# Patient Record
Sex: MALE | Race: WHITE | NOT HISPANIC OR LATINO | Employment: OTHER | URBAN - METROPOLITAN AREA
[De-identification: names, ages, dates, MRNs, and addresses within clinical notes are randomized per-mention and may not be internally consistent; named-entity substitution may affect disease eponyms.]

---

## 2017-01-19 ENCOUNTER — TRANSFERRED RECORDS (OUTPATIENT)
Dept: HEALTH INFORMATION MANAGEMENT | Facility: CLINIC | Age: 67
End: 2017-01-19

## 2017-02-02 ENCOUNTER — OFFICE VISIT (OUTPATIENT)
Dept: CARDIOLOGY | Facility: CLINIC | Age: 67
End: 2017-02-02
Attending: INTERNAL MEDICINE
Payer: COMMERCIAL

## 2017-02-02 VITALS
HEIGHT: 70 IN | HEART RATE: 60 BPM | BODY MASS INDEX: 25.77 KG/M2 | DIASTOLIC BLOOD PRESSURE: 76 MMHG | SYSTOLIC BLOOD PRESSURE: 120 MMHG | WEIGHT: 180 LBS

## 2017-02-02 DIAGNOSIS — R55 VASOVAGAL SYNCOPE: ICD-10-CM

## 2017-02-02 DIAGNOSIS — I10 HYPERTENSION GOAL BP (BLOOD PRESSURE) < 140/90: ICD-10-CM

## 2017-02-02 DIAGNOSIS — Z13.6 CARDIOVASCULAR SCREENING; LDL GOAL LESS THAN 130: ICD-10-CM

## 2017-02-02 DIAGNOSIS — I67.9 CEREBROVASCULAR DISEASE: ICD-10-CM

## 2017-02-02 DIAGNOSIS — I25.810 CORONARY ARTERY DISEASE INVOLVING CORONARY BYPASS GRAFT OF NATIVE HEART WITHOUT ANGINA PECTORIS: ICD-10-CM

## 2017-02-02 DIAGNOSIS — R94.39 ABNORMAL CARDIOVASCULAR STRESS TEST: ICD-10-CM

## 2017-02-02 DIAGNOSIS — I43 CARDIOMYOPATHY IN DISEASES CLASSIFIED ELSEWHERE (H): ICD-10-CM

## 2017-02-02 LAB
ALT SERPL W P-5'-P-CCNC: 43 U/L (ref 0–70)
CHOLEST SERPL-MCNC: 127 MG/DL
HDLC SERPL-MCNC: 63 MG/DL
LDLC SERPL CALC-MCNC: 53 MG/DL
NONHDLC SERPL-MCNC: 64 MG/DL
TRIGL SERPL-MCNC: 54 MG/DL

## 2017-02-02 PROCEDURE — 36415 COLL VENOUS BLD VENIPUNCTURE: CPT | Performed by: INTERNAL MEDICINE

## 2017-02-02 PROCEDURE — 80061 LIPID PANEL: CPT | Performed by: INTERNAL MEDICINE

## 2017-02-02 PROCEDURE — 99214 OFFICE O/P EST MOD 30 MIN: CPT | Performed by: INTERNAL MEDICINE

## 2017-02-02 PROCEDURE — 84460 ALANINE AMINO (ALT) (SGPT): CPT | Performed by: INTERNAL MEDICINE

## 2017-02-02 RX ORDER — VIT C/E/ZN/COPPR/LUTEIN/ZEAXAN 60 MG-6 MG
1 CAPSULE ORAL DAILY
COMMUNITY
End: 2020-02-11

## 2017-02-02 NOTE — MR AVS SNAPSHOT
After Visit Summary   2/2/2017    Mynor Curry    MRN: [id number]           Patient Information     Date Of Birth          1950        Visit Information        Provider Department      2/2/2017 9:15 AM Alfonso Ng MD [facility]        Today's Diagnoses     Hypertension goal BP (blood pressure) < 140/90         Abnormal cardiovascular stress test         Coronary artery disease involving coronary bypass graft of native heart without angina pectoris         Cerebrovascular disease         Cardiomyopathy in diseases classified elsewhere (H)         Vasovagal syncope         CARDIOVASCULAR SCREENING; LDL GOAL LESS THAN 130            Follow-ups after your visit        Additional Services     Follow-Up with Cardiologist                 Future tests that were ordered for you today     Open Future Orders        Priority Expected Expires Ordered    Lipid Profile Routine 2/2/2018 3/9/2018 2/2/2017    ALT Routine 2/2/2018 3/9/2018 2/2/2017    Follow-Up with Cardiologist Routine 2/2/2018 6/17/2018 2/2/2017            Who to contact     If you have questions or need follow up information about today's clinic visit or your schedule please contact [facility] directly at [phone].  Normal or non-critical lab and imaging results will be communicated to you by MyChart, letter or phone within 4 business days after the clinic has received the results. If you do not hear from us within 7 days, please contact the clinic through [employer] or phone. If you have a critical or abnormal lab result, we will notify you by phone as soon as possible.  Submit refill requests through [employer] or call your pharmacy and they will forward the refill request to us. Please allow 3 business days for your refill to be completed.          Additional Information About Your Visit        [employer] Information     [employer] gives you secure  "access to your electronic health record. If you see a primary care provider, you can also send messages to your care team and make appointments. If you have questions, please call your primary care clinic.  If you do not have a primary care provider, please call [phone] and they will assist you.        Care EveryWhere ID     This is your Care EveryWhere ID. This could be used by other organizations to access your [facility] medical records  [id number]        Your Vitals Were     Pulse Height BMI (Body Mass Index)             60 1.778 m (5' 10\") 25.83 kg/m2          Blood Pressure from Last 3 Encounters:   02/02/17 120/76   12/27/16 129/81   10/17/16 153/86    Weight from Last 3 Encounters:   02/02/17 81.647 kg (180 lb)   10/17/16 79.833 kg (176 lb)   02/05/16 83.008 kg (183 lb)              We Performed the Following     Follow-Up with Cardiologist        Primary Care Provider Office Phone # Fax #    Doug Seo Jr., [phone]       [facility] [address] [facility] [address]        Thank you!     Thank you for choosing [facility] [place]  for your care. Our goal is always to provide you with excellent care. Hearing back from our patients is one way we can continue to improve our services. Please take a few minutes to complete the written survey that you may receive in the mail after your visit with us. Thank you!             Your Updated Medication List - Protect others around you: Learn how to safely use, store and throw away your medicines at www.disposemymeds.org.          This list is accurate as of: 2/2/17  9:35 AM.  Always use your most recent med list.                   Brand Name Dispense Instructions for use    ADVIL PO      Take 400 mg by mouth every 4 hours as needed       aspirin EC 81 MG EC tablet      Take 1 tablet (81 mg) by mouth daily       cyclobenzaprine 10 MG tablet    FLEXERIL    14 tablet    Take 1 tablet (10 mg) by " mouth nightly as needed for muscle spasms       * DAILY MULTIVITAMIN PO      Take by mouth daily       * multivitamin  with lutein Caps per capsule      Take 1 capsule by mouth daily       famotidine 20 MG tablet    PEPCID     Take 20 mg by mouth 2 times daily       LORazepam 1 MG tablet    ATIVAN    30 tablet    Take 1 tablet (1 mg) by mouth every 8 hours as needed for anxiety       losartan-hydrochlorothiazide 100-12.5 MG per tablet    HYZAAR    90 tablet    Take 1 tablet by mouth daily       rosuvastatin 20 MG tablet    CRESTOR    90 tablet    Take 1 tablet (20 mg) by mouth daily       sildenafil 100 MG cap/tab    VIAGRA    6 tablet    Take 0.5-1 tablets ( mg) by mouth daily as needed for erectile dysfunction Take 30 min to 4 hours before intercourse.  Never use with nitroglycerin, terazosin or doxazosin.       valACYclovir 500 MG tablet    VALTREX    30 tablet    One tablet by mouth twice daily for three days for each outbreak       vitamin D 2000 UNITS Caps      Take by mouth daily       * Notice:  This list has 2 medication(s) that are the same as other medications prescribed for you. Read the directions carefully, and ask your doctor or other care provider to review them with you.

## 2017-02-02 NOTE — PROGRESS NOTES
HPI and Plan:   See dictation    Orders Placed This Encounter   Procedures     Lipid Profile     ALT     Follow-Up with Cardiologist       Orders Placed This Encounter   Medications     multivitamin  with lutein (OCUVITE WITH LTEIN) CAPS per capsule     Sig: Take 1 capsule by mouth daily       There are no discontinued medications.      Encounter Diagnoses   Name Primary?     Hypertension goal BP (blood pressure) < 140/90      Abnormal cardiovascular stress test      Coronary artery disease involving coronary bypass graft of native heart without angina pectoris      Cerebrovascular disease      Cardiomyopathy in diseases classified elsewhere (H)      Vasovagal syncope      CARDIOVASCULAR SCREENING; LDL GOAL LESS THAN 130        CURRENT MEDICATIONS:  Current Outpatient Prescriptions   Medication Sig Dispense Refill     multivitamin  with lutein (OCUVITE WITH LTEIN) CAPS per capsule Take 1 capsule by mouth daily       losartan-hydrochlorothiazide (HYZAAR) 100-12.5 MG per tablet Take 1 tablet by mouth daily 90 tablet 3     valACYclovir (VALTREX) 500 MG tablet One tablet by mouth twice daily for three days for each outbreak 30 tablet 3     rosuvastatin (CRESTOR) 20 MG tablet Take 1 tablet (20 mg) by mouth daily 90 tablet 3     cyclobenzaprine (FLEXERIL) 10 MG tablet Take 1 tablet (10 mg) by mouth nightly as needed for muscle spasms 14 tablet 1     Multiple Vitamins-Minerals (DAILY MULTIVITAMIN PO) Take by mouth daily       Cholecalciferol (VITAMIN D) 2000 UNITS CAPS Take by mouth daily       famotidine (PEPCID) 20 MG tablet Take 20 mg by mouth 2 times daily       aspirin EC 81 MG tablet Take 1 tablet (81 mg) by mouth daily       sildenafil (VIAGRA) 100 MG tablet Take 0.5-1 tablets ( mg) by mouth daily as needed for erectile dysfunction Take 30 min to 4 hours before intercourse.  Never use with nitroglycerin, terazosin or doxazosin. 6 tablet 11     Ibuprofen (ADVIL PO) Take 400 mg by mouth every 4 hours as needed         LORazepam (ATIVAN) 1 MG tablet Take 1 tablet (1 mg) by mouth every 8 hours as needed for anxiety 30 tablet 1       ALLERGIES     Allergies   Allergen Reactions     Augmentin      vomiting       PAST MEDICAL HISTORY:  Past Medical History   Diagnosis Date     Hypertension      Arthritis      Syncope      vasovagal     Coronary artery disease      Mild     HLD (hyperlipidemia)        PAST SURGICAL HISTORY:  Past Surgical History   Procedure Laterality Date     Arthroscopy shoulder  1999     right     Arthroscopy knee with medial meniscectomy  2001     left     Hernia repair, inguinal rt/lt  2011     bilateral     Hemilaminectomy, discectomy lumbar one level, combined  1989     Arthroplasty hip anterior  4/30/2014     Procedure: ARTHROPLASTY HIP ANTERIOR;  Surgeon: Ayaz Butcher MD;  Location: [facility] OR     Colonoscopy       Orthopedic surgery         FAMILY HISTORY:  Family History   Problem Relation Age of Onset     C.A.D. No family hx of      DIABETES No family hx of      Prostate Cancer No family hx of      Cancer - colorectal No family hx of      mother with some polyps though     Arthritis No family hx of      Hypertension No family hx of      Arthritis Mother      Hypertension Mother      Arthritis Father      Hypertension Father      Arrhythmia Father      atrial fib       SOCIAL HISTORY:  Social History     Social History     Marital Status:      Spouse Name: Melanie     Number of Children: 3     Years of Education: N/A     Occupational History      Retired     Social History Main Topics     Smoking status: Never Smoker      Smokeless tobacco: Never Used     Alcohol Use: 0.0 oz/week     0 Standard drinks or equivalent per week      Comment: 2-3 drinks per week     Drug Use: No     Sexual Activity:     Partners: Female     Birth Control/ Protection: Condom     Other Topics Concern     Parent/Sibling W/ Cabg, Mi Or Angioplasty Before 65f 55m? No      Service No     Blood  "Transfusions No     Caffeine Concern No     Hot tea daily - 2 cups     Occupational Exposure No     Hobby Hazards No     Sleep Concern No     Stress Concern No     Weight Concern No     Special Diet No     Back Care No     Exercise Yes     biking- 2-3 days per week  on average     Bike Helmet Yes     Seat Belt Yes     Self-Exams Yes     Social History Narrative       Review of Systems:  Skin:  Negative       Eyes:  Positive for glasses    ENT:  Negative      Respiratory:  Negative       Cardiovascular:  Negative      Gastroenterology: Negative      Genitourinary:  Positive for erectile dysfunction;prostate problem;urinary frequency;hesitancy BPH ,   Musculoskeletal:  Positive for back pain;joint pain;joint stiffness;arthritis bone on bone - both knees  , thumbs - stiffness and pain,  arthritis in both big toes   Neurologic:  Positive for headaches in AM -  has decreased   Psychiatric:  Negative      Heme/Lymph/Imm:  Positive for allergies RX allergy   Endocrine:  Negative        Physical Exam:  Vitals: /76 mmHg  Pulse 60  Ht 1.778 m (5' 10\")  Wt 81.647 kg (180 lb)  BMI 25.83 kg/m2    Constitutional:  cooperative, alert and oriented, well developed, well nourished, in no acute distress thin      Skin:  warm and dry to the touch, no apparent skin lesions or masses noted        Head:  normocephalic, no masses or lesions        Eyes:  pupils equal and round, conjunctivae and lids unremarkable, sclera white, no xanthalasma, EOMS intact, no nystagmus        ENT:  no pallor or cyanosis, dentition good;no pallor or cyanosis        Neck:  carotid pulses are full and equal bilaterally, JVP normal, no carotid bruit, no thyromegaly        Chest:  normal breath sounds, clear to auscultation, normal A-P diameter, normal symmetry, normal respiratory excursion, no use of accessory muscles          Cardiac: regular rhythm, normal S1/S2, no S3 or S4, apical impulse not displaced, no murmurs, gallops or rubs              "     Abdomen:           Vascular: pulses full and equal, no bruits auscultated                                        Extremities and Back:  no deformities, clubbing, cyanosis, erythema observed;no edema              Neurological:  affect appropriate, oriented to time, person and place;no gross motor deficits              CC  Doug Seo Jr., MD  [facility]  [address] [place] [address]

## 2017-02-02 NOTE — Clinical Note
2017         Doug Seo MD   [facility]   [address] [place]  [address]      RE: Mynor Curry   MRN: [id number]   : 1950      Dear Doctors:       It is my pleasure to see your patient, Mynor Curry, in followup.  As you know, this is a patient who has a number of cardiac issues.  If you remember, an echocardiogram suggested the patient possibly had inferior hypokinesis and he had a stress nuclear scan which was abnormal.  These were as part of an investigation for vasovagal syncope that he has had for the last 20 years, but was getting worse over the previous few months before he presented to our clinic.  He had a CT angiogram performed because of the abnormal stress test which showed only modest coronary artery disease.  I would refer you to my previous dictation about that.  He also had an MRI scan which showed that he did have normal wall motion and that there was no evidence of scar.  However, the patient does have coronary artery disease and therefore he falls within secondary prevention guidelines.  He was started on atorvastatin.  His liver function tests became quite abnormal.  Initially, we thought it was because he was taking a significant amount of Tylenol.  He takes about 3 Tylenol pills per day.  However, on stopping atorvastatin his liver function tests did improve and he is on rosuvastatin now.  His lipids are excellent on rosuvastatin with an LDL of 53, HDL of 63 and triglycerides of 54 with a total cholesterol of 127.  His liver function tests are normal with an ALT of 43 (the upper limit of normal is 70).  The patient feels well.  He has no chest pains or chest pressure.  He decided to try marijuana out for the first time in his life and he had a vasovagal syncope last summer with that.  He has not gone near it again and he has had no episodes of vasovagal syncope since that time.  His blood pressure is well controlled at 120/76.        He was  complaining of some sharp shooting pains in his left arm.  However, he had been using his chainsaw in the snow for quite a long period of time.  So, it does appear that the discomfort was most likely related to that.  He had no chest pain at all and was not feeling unwell.  The pains were shooting into the triceps area.  He has no pain in that area or anywhere when he exerts himself typically.  This happened about a few days ago and has not recurred.  It is most likely musculoskeletal and noncardiac, especially given the investigations that we have had recently.      IMPRESSION:   1.  Vasovagal syncope.  He had 1 episode last summer associated with smoking marijuana, which he had never done before.  It has not recurred since that time.   2.  Excellent lipid profile, as I mentioned above on rosuvastatin with normal liver function tests.   3.  Coronary artery disease.  The patient is asymptomatic with respect to coronary artery disease.   4.  Normotensive with a blood pressure of 120/76.      PLAN:  We will continue the patient on his good present medications.  I will see him back again in 1 year's time and I will repeat his lipids at that stage with liver function tests.  As always, he has been told to contact me if he has any questions or concerns, especially if he starts to develop recurring episodes with vasovagal syncope.  It has my pleasure to be involved in the care of this nice patient.      Sincerely,            Alfonso Lu MD, FACC

## 2017-02-02 NOTE — PROGRESS NOTES
2017         Doug Seo MD   [facility]   [address] [place]  [address]      RE: Mynor Curry   MRN: [id number]   : 1950      Dear Doctors:       It is my pleasure to see your patient, Mynor Curry, in followup.  As you know, this is a patient who has a number of cardiac issues.  If you remember, an echocardiogram suggested the patient possibly had inferior hypokinesis and he had a stress nuclear scan which was abnormal.  These were as part of an investigation for vasovagal syncope that he has had for the last 20 years, but was getting worse over the previous few months before he presented to our clinic.  He had a CT angiogram performed because of the abnormal stress test which showed only modest coronary artery disease.  I would refer you to my previous dictation about that.  He also had an MRI scan which showed that he did have normal wall motion and that there was no evidence of scar.  However, the patient does have coronary artery disease and therefore he falls within secondary prevention guidelines.  He was started on atorvastatin.  His liver function tests became quite abnormal.  Initially, we thought it was because he was taking a significant amount of Tylenol.  He takes about 3 Tylenol pills per day.  However, on stopping atorvastatin his liver function tests did improve and he is on rosuvastatin now.  His lipids are excellent on rosuvastatin with an LDL of 53, HDL of 63 and triglycerides of 54 with a total cholesterol of 127.  His liver function tests are normal with an ALT of 43 (the upper limit of normal is 70).  The patient feels well.  He has no chest pains or chest pressure.  He decided to try marijuana out for the first time in his life and he had a vasovagal syncope last summer with that.  He has not gone near it again and he has had no episodes of vasovagal syncope since that time.  His blood pressure is well controlled at 120/76.        He was  complaining of some sharp shooting pains in his left arm.  However, he had been using his chainsaw in the snow for quite a long period of time.  So, it does appear that the discomfort was most likely related to that.  He had no chest pain at all and was not feeling unwell.  The pains were shooting into the triceps area.  He has no pain in that area or anywhere when he exerts himself typically.  This happened about a few days ago and has not recurred.  It is most likely musculoskeletal and noncardiac, especially given the investigations that we have had recently.      IMPRESSION:   1.  Vasovagal syncope.  He had 1 episode last summer associated with smoking marijuana, which he had never done before.  It has not recurred since that time.   2.  Excellent lipid profile, as I mentioned above on rosuvastatin with normal liver function tests.   3.  Coronary artery disease.  The patient is asymptomatic with respect to coronary artery disease.   4.  Normotensive with a blood pressure of 120/76.      PLAN:  We will continue the patient on his good present medications.  I will see him back again in 1 year's time and I will repeat his lipids at that stage with liver function tests.  As always, he has been told to contact me if he has any questions or concerns, especially if he starts to develop recurring episodes with vasovagal syncope.  It has my pleasure to be involved in the care of this nice patient.      Sincerely,            Alfonso Lu MD, FACC         ALFONSO LU MD, FACC             D: 2017 09:41   T: 2017 14:19   MT: ADAN      Name:     BRINA PALACIOS   MRN:      [id number]        Account:      [id number]   :      1950           Service Date: 2017      Document: [id number]

## 2017-02-04 PROBLEM — I25.10 CORONARY ARTERY DISEASE: Status: ACTIVE | Noted: 2017-02-04

## 2017-02-16 ENCOUNTER — DOCUMENTATION ONLY (OUTPATIENT)
Dept: VASCULAR SURGERY | Facility: CLINIC | Age: 67
End: 2017-02-16

## 2017-03-14 ENCOUNTER — TRANSFERRED RECORDS (OUTPATIENT)
Dept: HEALTH INFORMATION MANAGEMENT | Facility: CLINIC | Age: 67
End: 2017-03-14

## 2017-06-23 DIAGNOSIS — E78.5 HLD (HYPERLIPIDEMIA): ICD-10-CM

## 2017-06-23 RX ORDER — ROSUVASTATIN CALCIUM 20 MG/1
20 TABLET, COATED ORAL DAILY
Qty: 90 TABLET | Refills: 2 | Status: SHIPPED | OUTPATIENT
Start: 2017-06-23 | End: 2018-01-16

## 2017-06-30 ENCOUNTER — THERAPY VISIT (OUTPATIENT)
Dept: PHYSICAL THERAPY | Facility: CLINIC | Age: 67
End: 2017-06-30
Payer: COMMERCIAL

## 2017-06-30 DIAGNOSIS — S46.012D ROTATOR CUFF STRAIN, LEFT, SUBSEQUENT ENCOUNTER: Primary | ICD-10-CM

## 2017-06-30 PROCEDURE — 97035 APP MDLTY 1+ULTRASOUND EA 15: CPT | Mod: GP | Performed by: PHYSICAL THERAPIST

## 2017-06-30 PROCEDURE — 97161 PT EVAL LOW COMPLEX 20 MIN: CPT | Mod: GP | Performed by: PHYSICAL THERAPIST

## 2017-06-30 PROCEDURE — 97110 THERAPEUTIC EXERCISES: CPT | Mod: GP | Performed by: PHYSICAL THERAPIST

## 2017-06-30 NOTE — PROGRESS NOTES
Subjective:    Patient is a 66 year old male presenting with rehab left shoulder hpi.           Pt injured the left shoulder 3 weeks ago when he tried to stop the momentum of a boat as they were landing it at a dock.  He received a cortizone injection on 6/21/17 that has been beneficial.  He continues to have pain with reaching and lifting above shoulder level and with pushing and pulling activities.  .    Patient reports pain:  Anterior and lateral.  Radiates to:  Upper arm.  Pain is described as sharp, aching and stabbing and is intermittent and reported as 6/10.  Associated symptoms:  Catching and loss of strength. Pain is the same all the time.  Symptoms are exacerbated by using arm at shoulder level, lifting and lying on extremity and relieved by rest.  Since onset symptoms are gradually improving.        General health as reported by patient is excellent.                  Barriers include:  None as reported by the patient.    Red flags:  None as reported by the patient.                        Objective:    System                   Shoulder Evaluation:  ROM:  AROM:    Flexion:  Left:  Pain at 110    Right:  Full    Abduction:  Left: pain at 90   Right:  Full                  Extension/Internal Rotation:  Left:  Full with pain    Right:  Full    PROM:    Flexion:  Left:  Full with ERP          Abduction:  Left:  Full with ERP        Internal Rotation:  Left:  Full      External Rotation:  Left:  Full with ERP                        Strength:    Flexion: Left:5/5    Pain: -        Abduction:  Left: 5/5   Pain:-/+        Internal Rotation:  Left:5-/5      Pain:-/+      External Rotation:   Left:5/5      Pain:-/+         Elbow Flexion:  Left:5/5      Pain:-      Elbow Extension:  Left:5/5      Pain:-      Stability Testing:      Left shoulder stability negative testing:  Sulcus sign; Load and shift anterior and Load and shift posterior    Special Tests:    Left shoulder positive for the following special tests:   Impingement  Left shoulder negative for the following special tests:  Rotator cuff tear and Acromioclavicular    Palpation:  normal      Mobility Tests:  normal                                                 General     ROS    Assessment/Plan:      Patient is a 66 year old male with left side shoulder complaints.    Patient has the following significant findings with corresponding treatment plan.                Diagnosis 1:  Left RTC strain  Pain -  hot/cold therapy, US, education and home program  Decreased ROM/flexibility - manual therapy, therapeutic exercise and home program  Decreased strength - therapeutic exercise, therapeutic activities and home program    Therapy Evaluation Codes:   1) History comprised of:   Personal factors that impact the plan of care:      None.    Comorbidity factors that impact the plan of care are:      None.     Medications impacting care: None.  2) Examination of Body Systems comprised of:   Body structures and functions that impact the plan of care:      Shoulder.   Activity limitations that impact the plan of care are:      Dressing, Lifting and Sports.  3) Clinical presentation characteristics are:   Stable/Uncomplicated.  4) Decision-Making    Low complexity using standardized patient assessment instrument and/or measureable assessment of functional outcome.  Cumulative Therapy Evaluation is: Low complexity.    Previous and current functional limitations:  (See Goal Flow Sheet for this information)    Short term and Long term goals: (See Goal Flow Sheet for this information)     Communication ability:  Patient appears to be able to clearly communicate and understand verbal and written communication and follow directions correctly.  Treatment Explanation - The following has been discussed with the patient:   RX ordered/plan of care  Anticipated outcomes  Possible risks and side effects  This patient would benefit from PT intervention to resume normal activities.   Rehab  potential is good.    Frequency:  1 X week, once daily  Duration:  for 4 weeks  Discharge Plan:  Achieve all LTG.  Independent in home treatment program.  Reach maximal therapeutic benefit.    Please refer to the daily flowsheet for treatment today, total treatment time and time spent performing 1:1 timed codes.         2

## 2017-06-30 NOTE — PROGRESS NOTES
Subjective:    Patient is a 66 year old male presenting with rehab left ankle/foot hpi.                                      Pertinent medical history includes:  Osteoarthritis, high blood pressure and implanted device.    Surgical history: back, shoulder, hip, knees.  Current medications:  Cardiac medication, anti-inflammatory and high blood pressure medication (statin).  Current occupation is retired.                                    Objective:    System    Physical Exam    General     ROS    Assessment/Plan:

## 2017-06-30 NOTE — LETTER
[facility]  [address]  [facility] [address]  [phone]    2017    Re: Mynor Curry   :   1950  MRN:  [id number]   REFERRING PHYSICIAN:   Jonas Lindsey    [facility]  Date of Initial Evaluation:  2017  Visits:  Rxs Used: 1  Reason for Referral:  Rotator cuff strain, left, subsequent encounter    EVALUATION SUMMARY  Subjective:  Patient is a 66 year old male presenting with rehab left shoulder hpi.   Pt injured the left shoulder 3 weeks ago when he tried to stop the momentum of a boat as they were landing it at a dock.  He received a cortizone injection on 17 that has been beneficial.  He continues to have pain with reaching and lifting above shoulder level and with pushing and pulling activities.  Patient reports pain:  Anterior and lateral.  Radiates to:  Upper arm.  Pain is described as sharp, aching and stabbing and is intermittent and reported as 6/10.  Associated symptoms:  Catching and loss of strength. Pain is the same all the time.  Symptoms are exacerbated by using arm at shoulder level, lifting and lying on extremity and relieved by rest.  Since onset symptoms are gradually improving.  General health as reported by patient is excellent.                Barriers include:  None as reported by the patient.  Red flags:  None as reported by the patient.  Pertinent medical history includes:  Osteoarthritis, high blood pressure and implanted device. Surgical history: back, shoulder, hip, knees.  Current medications:  Cardiac medication, anti-inflammatory and high blood pressure medication (statin).  Current occupation is retired.     Objective:  Shoulder Evaluation:  ROM:  AROM:    Flexion:  Left:  Pain at 110    Right:  Full  Abduction:  Left: pain at 90   Right:  Full  Extension/Internal Rotation:  Left:  Full with pain    Right:  Full      PROM:    Flexion:  Left:  Full with ERP        Abduction:  Left:  Full with  ERP      Internal Rotation:  Left:  Full      External Rotation:  Left:  Full with ERP              Re: Mynor Curry   :   1950    Strength:    Flexion: Left:5/5    Pain: -      Abduction:  Left: 5/5   Pain:-/+      Internal Rotation:  Left:5-/5      Pain:-/+      External Rotation:   Left:5/5      Pain:-/+     Elbow Flexion:  Left:5/5      Pain:-      Elbow Extension:  Left:5/5      Pain:-        Stability Testing:    Left shoulder stability negative testing:  Sulcus sign; Load and shift anterior and Load and shift posterior    Special Tests:    Left shoulder positive for the following special tests:  Impingement  Left shoulder negative for the following special tests:  Rotator cuff tear and Acromioclavicular    Palpation:  normal  Mobility Tests:  normal    Assessment/Plan:    Patient is a 66 year old male with left side shoulder complaints.    Patient has the following significant findings with corresponding treatment plan.                Diagnosis 1:  Left RTC strain  Pain -  hot/cold therapy, US, education and home program  Decreased ROM/flexibility - manual therapy, therapeutic exercise and home program  Decreased strength - therapeutic exercise, therapeutic activities and home program    Therapy Evaluation Codes:   1) History comprised of:   Personal factors that impact the plan of care:      None.    Comorbidity factors that impact the plan of care are:      None.     Medications impacting care: None.  2) Examination of Body Systems comprised of:   Body structures and functions that impact the plan of care:      Shoulder.   Activity limitations that impact the plan of care are:      Dressing, Lifting and Sports.  3) Clinical presentation characteristics are:   Stable/Uncomplicated.  4) Decision-Making    Low complexity using standardized patient assessment instrument and/or measureable assessment of functional outcome.  Cumulative Therapy Evaluation is: Low complexity.    Previous and current  functional limitations:  (See Goal Flow Sheet for this information)    Short term and Long term goals: (See Goal Flow Sheet for this information)       Re: Mynor Curry   :   1950        Communication ability:  Patient appears to be able to clearly communicate and understand verbal and written communication and follow directions correctly.  Treatment Explanation - The following has been discussed with the patient:   RX ordered/plan of care  Anticipated outcomes  Possible risks and side effects  This patient would benefit from PT intervention to resume normal activities.   Rehab potential is good.    Frequency:  1 X week, once daily  Duration:  for 4 weeks  Discharge Plan:  Achieve all LTG.  Independent in home treatment program.  Reach maximal therapeutic benefit.                            Thank you for your referral.    INQUIRIES  Therapist: Diego Hobson, PT   [facility] [place]  [address]  [facility] [address]  Phone: [phone]  Fax: [phone]

## 2017-07-06 ENCOUNTER — THERAPY VISIT (OUTPATIENT)
Dept: PHYSICAL THERAPY | Facility: CLINIC | Age: 67
End: 2017-07-06
Payer: COMMERCIAL

## 2017-07-06 DIAGNOSIS — S46.012D ROTATOR CUFF STRAIN, LEFT, SUBSEQUENT ENCOUNTER: ICD-10-CM

## 2017-07-06 PROCEDURE — 97035 APP MDLTY 1+ULTRASOUND EA 15: CPT | Mod: GP | Performed by: PHYSICAL THERAPIST

## 2017-07-06 PROCEDURE — 97110 THERAPEUTIC EXERCISES: CPT | Mod: GP | Performed by: PHYSICAL THERAPIST

## 2017-07-17 ENCOUNTER — THERAPY VISIT (OUTPATIENT)
Dept: PHYSICAL THERAPY | Facility: CLINIC | Age: 67
End: 2017-07-17
Payer: COMMERCIAL

## 2017-07-17 DIAGNOSIS — S46.012D ROTATOR CUFF STRAIN, LEFT, SUBSEQUENT ENCOUNTER: ICD-10-CM

## 2017-07-17 PROCEDURE — 97110 THERAPEUTIC EXERCISES: CPT | Mod: GP | Performed by: PHYSICAL THERAPIST

## 2017-07-17 PROCEDURE — 97035 APP MDLTY 1+ULTRASOUND EA 15: CPT | Mod: GP | Performed by: PHYSICAL THERAPIST

## 2017-07-31 ENCOUNTER — THERAPY VISIT (OUTPATIENT)
Dept: PHYSICAL THERAPY | Facility: CLINIC | Age: 67
End: 2017-07-31
Payer: COMMERCIAL

## 2017-07-31 DIAGNOSIS — S46.012D ROTATOR CUFF STRAIN, LEFT, SUBSEQUENT ENCOUNTER: ICD-10-CM

## 2017-07-31 PROCEDURE — 97110 THERAPEUTIC EXERCISES: CPT | Mod: GP | Performed by: PHYSICAL THERAPIST

## 2017-07-31 PROCEDURE — 97035 APP MDLTY 1+ULTRASOUND EA 15: CPT | Mod: GP | Performed by: PHYSICAL THERAPIST

## 2017-08-07 ENCOUNTER — THERAPY VISIT (OUTPATIENT)
Dept: PHYSICAL THERAPY | Facility: CLINIC | Age: 67
End: 2017-08-07
Payer: COMMERCIAL

## 2017-08-07 DIAGNOSIS — S46.012D ROTATOR CUFF STRAIN, LEFT, SUBSEQUENT ENCOUNTER: ICD-10-CM

## 2017-08-07 PROCEDURE — 97035 APP MDLTY 1+ULTRASOUND EA 15: CPT | Mod: GP | Performed by: PHYSICAL THERAPIST

## 2017-08-07 PROCEDURE — 97110 THERAPEUTIC EXERCISES: CPT | Mod: GP | Performed by: PHYSICAL THERAPIST

## 2017-08-28 ENCOUNTER — THERAPY VISIT (OUTPATIENT)
Dept: PHYSICAL THERAPY | Facility: CLINIC | Age: 67
End: 2017-08-28
Payer: COMMERCIAL

## 2017-08-28 DIAGNOSIS — S46.012D ROTATOR CUFF STRAIN, LEFT, SUBSEQUENT ENCOUNTER: ICD-10-CM

## 2017-08-28 PROCEDURE — 97035 APP MDLTY 1+ULTRASOUND EA 15: CPT | Mod: GP | Performed by: PHYSICAL THERAPIST

## 2017-08-28 PROCEDURE — 97110 THERAPEUTIC EXERCISES: CPT | Mod: GP | Performed by: PHYSICAL THERAPIST

## 2017-08-28 NOTE — PROGRESS NOTES
Subjective:    HPI                    Objective:    System    Physical Exam    General     ROS    Assessment/Plan:      PROGRESS  REPORT    Progress reporting period is from 6/30/17 to 8/28/17 (6 visits).       SUBJECTIVE  Subjective changes noted by patient:   Subjective: The left shoulder is slightly better since starting PT on 6/29/17, but continues to be sore.      Current pain level is  Current Pain level: 4/10.     Previous pain level was   Initial Pain level: 6/10.   Changes in function:  Minimal.  Adverse reaction to treatment or activity: None    OBJECTIVE  Changes noted in objective findings:    Objective: Resisted IR remains weak and painful.  Pain with passive ER of shoulder at approximately 75 degrees.     ASSESSMENT/PLAN  Updated problem list and treatment plan: Diagnosis 1:  Left RTC strain    STG/LTGs have been met or progress has been made towards goals:  Yes, but minimal.  Assessment of Progress: Pt is making less progress than anticipated.  Self Management Plans:  Patient has been instructed in a home treatment program.    Mynor continues to require the following intervention to meet STG and LTG's:  PT intervention is no longer required to meet STG/LTG.    Recommendations:  This patient would benefit from further evaluation.  I suspect possible tear of the subscapularis.    Please refer to the daily flowsheet for treatment today, total treatment time and time spent performing 1:1 timed codes.

## 2017-08-28 NOTE — LETTER
[facility]  [address] [facility] [address] [phone]    2017    Re: Mynor Curry   :   1950  MRN:  [id number]   REFERRING PHYSICIAN:   Jonas Lindsey    [facility]  Date of Initial Evaluation: 17  Visits:  Rxs Used: 6  Reason for Referral:  Rotator cuff strain, left, subsequent encounter    PROGRESS  REPORT  Progress reporting period is from 17 to 17 (6 visits).     SUBJECTIVE  Subjective changes noted by patient: The left shoulder is slightly better since starting PT on 17, but continues to be sore.      Current pain level is  Current Pain level: 4/10.     Previous pain level was   Initial Pain level: 6/10.   Changes in function:  Minimal.  Adverse reaction to treatment or activity: None    OBJECTIVE  Changes noted in objective findings:    Objective: Resisted IR remains weak and painful.  Pain with passive ER of shoulder at approximately 75 degrees.     ASSESSMENT/PLAN  Updated problem list and treatment plan: Diagnosis 1:  Left RTC strain    STG/LTGs have been met or progress has been made towards goals  Yes, but minimal.  Assessment of Progress: Pt is making less progress than anticipated.  Self Management Plans: Patient has been instructed in a home treatment program.  Mynor continues to require the following intervention to meet STG and LTGs: PT intervention is no longer required to meet STG/LTG.  Recommendations:  This patient would benefit from further evaluation.  I suspect possible tear of the subscapularis.      Thank you for your referral.    INQUIRIES  Therapist: Diego Hobson, PT   [facility]  [address] [address]  Phone: [phone]/Fax: [phone]

## 2017-11-07 ENCOUNTER — TRANSFERRED RECORDS (OUTPATIENT)
Dept: HEALTH INFORMATION MANAGEMENT | Facility: CLINIC | Age: 67
End: 2017-11-07

## 2017-11-14 ENCOUNTER — ALLIED HEALTH/NURSE VISIT (OUTPATIENT)
Dept: NURSING | Facility: CLINIC | Age: 67
End: 2017-11-14
Payer: COMMERCIAL

## 2017-11-14 DIAGNOSIS — Z23 NEED FOR PROPHYLACTIC VACCINATION AND INOCULATION AGAINST INFLUENZA: Primary | ICD-10-CM

## 2017-11-14 PROCEDURE — G0008 ADMIN INFLUENZA VIRUS VAC: HCPCS

## 2017-11-14 PROCEDURE — 90662 IIV NO PRSV INCREASED AG IM: CPT

## 2017-11-14 NOTE — MR AVS SNAPSHOT
After Visit Summary   11/14/2017    Mynor Curry    MRN: [id number]           Patient Information     Date Of Birth          1950        Visit Information        Provider Department      11/14/2017 10:30 AM NUAR DA SILVA/[facility]        Today's Diagnoses     Need for prophylactic vaccination and inoculation against influenza    -  1       Follow-ups after your visit        Who to contact     If you have questions or need follow up information about today's clinic visit or your schedule please contact [facility] [place] directly at [phone].  Normal or non-critical lab and imaging results will be communicated to you by "[employer]hart, letter or phone within 4 business days after the clinic has received the results. If you do not hear from us within 7 days, please contact the clinic through [employer] or phone. If you have a critical or abnormal lab result, we will notify you by phone as soon as possible.  Submit refill requests through [employer] or call your pharmacy and they will forward the refill request to us. Please allow 3 business days for your refill to be completed.          Additional Information About Your Visit        MyChart Information     [employer] gives you secure access to your electronic health record. If you see a primary care provider, you can also send messages to your care team and make appointments. If you have questions, please call your primary care clinic.  If you do not have a primary care provider, please call [phone] and they will assist you.        Care EveryWhere ID     This is your Care EveryWhere ID. This could be used by other organizations to access your [facility] medical records  [id number]         Blood Pressure from Last 3 Encounters:   02/02/17 120/76   12/27/16 129/81   10/17/16 153/86    Weight from Last 3 Encounters:   02/02/17 180 lb (81.6 kg)   10/17/16 176 lb (79.8 kg)   02/05/16 183 lb (83 kg)              We Performed the Following      ADMIN INFLUENZA (For MEDICARE Patients ONLY) []     FLU VACCINE, INCREASED ANTIGEN, PRESV FREE, AGE 65+ [30772]        Primary Care Provider Office Phone # Fax #    Doug Seo Jr., [phone] [address]  [place] [address]        Equal Access to Services     SOPHIE LEDESMA : Hadii aad ku hadasho Soomaali, waaxda luqadaha, qaybta kaalmada adeegyada, waxay idiin hayaan adeeg khcarlish la'aan . So [facility] [phone].    ATENCIÓN: Si habla español, tiene a salas disposición servicios gratuitos de asistencia lingüística. Efrainame al [phone].    We comply with applicable federal civil rights laws and [place] laws. We do not discriminate on the basis of race, color, national origin, age, disability, sex, sexual orientation, or gender identity.            Thank you!     Thank you for choosing [facility]  for your care. Our goal is always to provide you with excellent care. Hearing back from our patients is one way we can continue to improve our services. Please take a few minutes to complete the written survey that you may receive in the mail after your visit with us. Thank you!             Your Updated Medication List - Protect others around you: Learn how to safely use, store and throw away your medicines at www.disposemymeds.org.          This list is accurate as of: 11/14/17 11:09 AM.  Always use your most recent med list.                   Brand Name Dispense Instructions for use Diagnosis    ADVIL PO      Take 400 mg by mouth every 4 hours as needed        aspirin EC 81 MG EC tablet      Take 1 tablet (81 mg) by mouth daily    Occlusion and stenosis of carotid artery without mention of cerebral infarction, Abnormal cardiovascular stress test       cyclobenzaprine 10 MG tablet    FLEXERIL    14 tablet    Take 1 tablet (10 mg) by mouth nightly as needed for muscle spasms    Upper back pain on right side, Pain of right scapula       * DAILY MULTIVITAMIN PO      Take by  mouth daily        * multivitamin  with lutein Caps per capsule      Take 1 capsule by mouth daily        famotidine 20 MG tablet    PEPCID     Take 20 mg by mouth 2 times daily        LORazepam 1 MG tablet    ATIVAN    30 tablet    Take 1 tablet (1 mg) by mouth every 8 hours as needed for anxiety    Anxiety       losartan-hydrochlorothiazide 100-12.5 MG per tablet    HYZAAR    90 tablet    Take 1 tablet by mouth daily    Essential hypertension with goal blood pressure less than 140/90       rosuvastatin 20 MG tablet    CRESTOR    90 tablet    Take 1 tablet (20 mg) by mouth daily    HLD (hyperlipidemia)       sildenafil 100 MG tablet    VIAGRA    6 tablet    Take 0.5-1 tablets ( mg) by mouth daily as needed for erectile dysfunction Take 30 min to 4 hours before intercourse.  Never use with nitroglycerin, terazosin or doxazosin.    Erectile dysfunction due to arterial insufficiency       valACYclovir 500 MG tablet    VALTREX    30 tablet    One tablet by mouth twice daily for three days for each outbreak    Herpes simplex infection of other site of male genital organ       vitamin D 2000 UNITS Caps      Take by mouth daily        * Notice:  This list has 2 medication(s) that are the same as other medications prescribed for you. Read the directions carefully, and ask your doctor or other care provider to review them with you.

## 2017-11-14 NOTE — PROGRESS NOTES

## 2017-11-24 PROBLEM — S46.012D ROTATOR CUFF STRAIN, LEFT, SUBSEQUENT ENCOUNTER: Status: RESOLVED | Noted: 2017-06-30 | Resolved: 2017-11-24

## 2017-11-27 DIAGNOSIS — A60.02 HERPES SIMPLEX INFECTION OF OTHER SITE OF MALE GENITAL ORGAN: ICD-10-CM

## 2017-11-27 NOTE — TELEPHONE ENCOUNTER
Requested Prescriptions   Pending Prescriptions Disp Refills     valACYclovir (VALTREX) 500 MG tablet  Last Written Prescription Date:  11/15/2016  Last Fill Quantity: 30 tablet,  # refills: 3   Last Office Visit with [facility], [facility] or [facility] prescribing provider:  10/17/2016   Future Office Visit:      30 tablet 3     Sig: One tablet by mouth twice daily for three days for each outbreak    Antivirals for Herpes Protocol Failed    11/27/2017 10:55 AM       Failed - Recent or future visit with authorizing provider's specialty    Patient had office visit in the last year or has a visit in the next 30 days with authorizing provider.  See chart review.              Failed - Normal serum creatinine on file in past 12 months    Recent Labs   Lab Test  11/22/16   1058   CR  0.85            Passed - Patient is age 12 or older

## 2017-11-28 DIAGNOSIS — I10 ESSENTIAL HYPERTENSION WITH GOAL BLOOD PRESSURE LESS THAN 140/90: ICD-10-CM

## 2017-11-28 RX ORDER — LOSARTAN POTASSIUM AND HYDROCHLOROTHIAZIDE 12.5; 1 MG/1; MG/1
1 TABLET ORAL DAILY
Qty: 90 TABLET | Refills: 1 | Status: SHIPPED | OUTPATIENT
Start: 2017-11-28 | End: 2018-01-16

## 2017-11-29 RX ORDER — VALACYCLOVIR HYDROCHLORIDE 500 MG/1
TABLET, FILM COATED ORAL
Qty: 30 TABLET | Refills: 0 | Status: SHIPPED | OUTPATIENT
Start: 2017-11-29 | End: 2018-01-16

## 2017-11-29 NOTE — TELEPHONE ENCOUNTER
Medication is being filled for 1 time refill only due to:  Patient needs to be seen because it has been more than one year since last visit.      please call patient to set up for office visit. Thank you, Mague Ponce R.N.

## 2017-12-07 NOTE — TELEPHONE ENCOUNTER
Called [phone] and spoke with pt.  He is driving so will call back to make that appt when he has his schedule.  Barb Michele

## 2017-12-13 ENCOUNTER — TRANSFERRED RECORDS (OUTPATIENT)
Dept: HEALTH INFORMATION MANAGEMENT | Facility: CLINIC | Age: 67
End: 2017-12-13

## 2018-01-11 ENCOUNTER — TRANSFERRED RECORDS (OUTPATIENT)
Dept: HEALTH INFORMATION MANAGEMENT | Facility: CLINIC | Age: 68
End: 2018-01-11

## 2018-01-12 ENCOUNTER — THERAPY VISIT (OUTPATIENT)
Dept: PHYSICAL THERAPY | Facility: CLINIC | Age: 68
End: 2018-01-12
Payer: COMMERCIAL

## 2018-01-12 DIAGNOSIS — M76.51 PATELLAR TENDONITIS OF RIGHT KNEE: Primary | ICD-10-CM

## 2018-01-12 PROCEDURE — 97035 APP MDLTY 1+ULTRASOUND EA 15: CPT | Mod: GP | Performed by: PHYSICAL THERAPIST

## 2018-01-12 PROCEDURE — 97110 THERAPEUTIC EXERCISES: CPT | Mod: GP | Performed by: PHYSICAL THERAPIST

## 2018-01-12 PROCEDURE — 97161 PT EVAL LOW COMPLEX 20 MIN: CPT | Mod: GP | Performed by: PHYSICAL THERAPIST

## 2018-01-12 ASSESSMENT — ACTIVITIES OF DAILY LIVING (ADL)
SWELLING: NOT ANSWERED
GIVING WAY, BUCKLING OR SHIFTING OF KNEE: NOT ANSWERED
WEAKNESS: NOT ANSWERED
AS_A_RESULT_OF_YOUR_KNEE_INJURY,_HOW_WOULD_YOU_RATE_YOUR_CURRENT_LEVEL_OF_DAILY_ACTIVITY?: NEARLY NORMAL
GO UP STAIRS: ACTIVITY IS MINIMALLY DIFFICULT
STAND: ACTIVITY IS MINIMALLY DIFFICULT
WALK: ACTIVITY IS MINIMALLY DIFFICULT
STIFFNESS: NOT ANSWERED
KNEEL ON THE FRONT OF YOUR KNEE: ACTIVITY IS SOMEWHAT DIFFICULT
HOW_WOULD_YOU_RATE_THE_OVERALL_FUNCTION_OF_YOUR_KNEE_DURING_YOUR_USUAL_DAILY_ACTIVITIES?: NEARLY NORMAL
SQUAT: ACTIVITY IS MINIMALLY DIFFICULT
PAIN: THE SYMPTOM AFFECTS MY ACTIVITY SLIGHTLY
LIMPING: NOT ANSWERED
RISE FROM A CHAIR: ACTIVITY IS MINIMALLY DIFFICULT
KNEE_ACTIVITY_OF_DAILY_LIVING_SUM: 30
HOW_WOULD_YOU_RATE_THE_CURRENT_FUNCTION_OF_YOUR_KNEE_DURING_YOUR_USUAL_DAILY_ACTIVITIES_ON_A_SCALE_FROM_0_TO_100_WITH_100_BEING_YOUR_LEVEL_OF_KNEE_FUNCTION_PRIOR_TO_YOUR_INJURY_AND_0_BEING_THE_INABILITY_TO_PERFORM_ANY_OF_YOUR_USUAL_DAILY_ACTIVITIES?: 70
GO DOWN STAIRS: NOT ANSWERED
SIT WITH YOUR KNEE BENT: ACTIVITY IS MINIMALLY DIFFICULT

## 2018-01-12 NOTE — PROGRESS NOTES
[facility] Initial Evaluation  Subjective:  Patient is a 67 year old male presenting with rehab right knee hpi.           Pt began having right anterior knee pain 3-4 weeks ago for unknown reasons.  He believes it may have started after kneeling on his knee while working on a window.  He locates the pain in the area of the distal quadricep and the infrapatellar tendon.  Has been getting better lately.  .    Patient reports pain:  Anterior.    Pain is described as aching and sharp and is intermittent and reported as 2/10.   Pain is the same all the time.  Symptoms are exacerbated by activity, descending stairs, ascending stairs, bending/squatting and kneeling and relieved by rest.  Since onset symptoms are gradually improving.  Special testing: none.      General health as reported by patient is excellent.                  Barriers include:  None as reported by the patient.    Red flags:  None as reported by the patient.                        Objective:  System                                                Knee Evaluation:  ROM:  AROM: normal  PROM: normal  Strength:  Normal (Mild weakness of hip abduction)            Ligament Testing:  Normal                Special Tests:       Right knee negative for the following special tests:  Meniscal and Patellar Compression  Palpation:  Palpation of knee: Suprapatellar quad tendon.    Right knee tenderness present at:  Patellar Tendon  Edema:  Normal    Mobility Testing:  Normal            Functional Testing:          Quad:    Single Leg Squat:  Left:       Mild loss of control  Right:       Moderate loss of control  Bilateral Leg Squat:   Normal control              General     ROS    Assessment/Plan:    Patient is a 67 year old male with right side knee complaints.    Patient has the following significant findings with corresponding treatment plan.                Diagnosis 1:  Right knee patellar tendonitis with OA  Pain -  hot/cold therapy, US,  education and home program  Decreased strength - therapeutic exercise, therapeutic activities and home program    Therapy Evaluation Codes:   1) History comprised of:   Personal factors that impact the plan of care:      None.    Comorbidity factors that impact the plan of care are:      None.     Medications impacting care: None.  2) Examination of Body Systems comprised of:   Body structures and functions that impact the plan of care:      Knee.   Activity limitations that impact the plan of care are:      Jumping, Running, Squatting/kneeling and Stairs.  3) Clinical presentation characteristics are:   Stable/Uncomplicated.  4) Decision-Making    Low complexity using standardized patient assessment instrument and/or measureable assessment of functional outcome.  Cumulative Therapy Evaluation is: Low complexity.    Previous and current functional limitations:  (See Goal Flow Sheet for this information)    Short term and Long term goals: (See Goal Flow Sheet for this information)     Communication ability:  Patient appears to be able to clearly communicate and understand verbal and written communication and follow directions correctly.  Treatment Explanation - The following has been discussed with the patient:   RX ordered/plan of care  Anticipated outcomes  Possible risks and side effects  This patient would benefit from PT intervention to resume normal activities.   Rehab potential is good.    Frequency:  2 X week, once daily  Duration:  for 4 weeks  Discharge Plan:  Achieve all LTG.  Independent in home treatment program.  Reach maximal therapeutic benefit.    Please refer to the daily flowsheet for treatment today, total treatment time and time spent performing 1:1 timed codes.

## 2018-01-12 NOTE — LETTER
[facility]  [address]  [facility] [address]  [phone]    2018    Re: Mynor Curry   :   1950  MRN:  [id number]   REFERRING PHYSICIAN:   Linda Bauer    [facility]  Date of Initial Evaluation:  2018  Visits:  Rxs Used: 1  Reason for Referral:  Patellar tendonitis of right knee    [facility] Initial Evaluation  Subjective:  Patient is a 67 year old male presenting with rehab right knee hpi.   Pt began having right anterior knee pain 3-4 weeks ago for unknown reasons.  He believes it may have started after kneeling on his knee while working on a window.  He locates the pain in the area of the distal quadricep and the infrapatellar tendon.  Has been getting better lately.  .    Patient reports pain:  Anterior.    Pain is described as aching and sharp and is intermittent and reported as 2/10.   Pain is the same all the time.  Symptoms are exacerbated by activity, descending stairs, ascending stairs, bending/squatting and kneeling and relieved by rest.  Since onset symptoms are gradually improving.  Special testing: none.      General health as reported by patient is excellent.                Barriers include:  None as reported by the patient.  Red flags:  None as reported by the patient.  Objective:   Knee Evaluation:  ROM:  AROM: normal  PROM: normal  Strength:  Normal (Mild weakness of hip abduction)  Ligament Testing:  Normal  Special Tests:   Right knee negative for the following special tests:  Meniscal and Patellar Compression  Palpation:  Palpation of knee: Suprapatellar quad tendon.  Right knee tenderness present at:  Patellar Tendon  Edema:  Normal  Mobility Testing:  Normal  Functional Testing:    Quad:    Single Leg Squat:  Left:       Mild loss of control  Right:       Moderate loss of control  Bilateral Leg Squat:   Normal control    Assessment/Plan:    Patient is a 67 year old male  with right side knee complaints.    Patient has the following significant findings with corresponding treatment plan.                Diagnosis 1:  Right knee patellar tendonitis with OA  Pain -  hot/cold therapy, US, education and home program  Decreased strength - therapeutic exercise, therapeutic activities and home program  Re: Mynor Curry   :   1950    Therapy Evaluation Codes:   1) History comprised of:   Personal factors that impact the plan of care:      None.    Comorbidity factors that impact the plan of care are:      None.     Medications impacting care: None.  2) Examination of Body Systems comprised of:   Body structures and functions that impact the plan of care:      Knee.   Activity limitations that impact the plan of care are:      Jumping, Running, Squatting/kneeling and Stairs.  3) Clinical presentation characteristics are:   Stable/Uncomplicated.  4) Decision-Making    Low complexity using standardized patient assessment instrument and/or measureable assessment of functional outcome.  Cumulative Therapy Evaluation is: Low complexity.    Previous and current functional limitations:  (See Goal Flow Sheet for this information)    Short term and Long term goals: (See Goal Flow Sheet for this information)     Communication ability:  Patient appears to be able to clearly communicate and understand verbal and written communication and follow directions correctly.  Treatment Explanation - The following has been discussed with the patient:   RX ordered/plan of care  Anticipated outcomes  Possible risks and side effects  This patient would benefit from PT intervention to resume normal activities.   Rehab potential is good.    Frequency:  2 X week, once daily  Duration:  for 4 weeks  Discharge Plan:  Achieve all LTG.  Independent in home treatment program.  Reach maximal therapeutic benefit.    Please refer to the daily flowsheet for treatment today, total treatment time and time spent performing  1:1 timed codes.     Thank you for your referral.    INQUIRIES  Therapist: Diego Hobson PT   [facility]  [address] [address]  Phone: [phone]  Fax: [phone]

## 2018-01-16 ENCOUNTER — OFFICE VISIT (OUTPATIENT)
Dept: FAMILY MEDICINE | Facility: CLINIC | Age: 68
End: 2018-01-16
Payer: COMMERCIAL

## 2018-01-16 VITALS
WEIGHT: 179 LBS | HEIGHT: 70 IN | HEART RATE: 72 BPM | OXYGEN SATURATION: 99 % | SYSTOLIC BLOOD PRESSURE: 118 MMHG | DIASTOLIC BLOOD PRESSURE: 72 MMHG | BODY MASS INDEX: 25.62 KG/M2 | TEMPERATURE: 97.9 F

## 2018-01-16 DIAGNOSIS — E78.5 HYPERLIPIDEMIA LDL GOAL <130: ICD-10-CM

## 2018-01-16 DIAGNOSIS — I10 ESSENTIAL HYPERTENSION WITH GOAL BLOOD PRESSURE LESS THAN 140/90: ICD-10-CM

## 2018-01-16 DIAGNOSIS — N40.1 BENIGN NON-NODULAR PROSTATIC HYPERPLASIA WITH LOWER URINARY TRACT SYMPTOMS: ICD-10-CM

## 2018-01-16 DIAGNOSIS — Z00.00 ENCOUNTER FOR ROUTINE ADULT HEALTH EXAMINATION WITHOUT ABNORMAL FINDINGS: Primary | ICD-10-CM

## 2018-01-16 DIAGNOSIS — N52.01 ERECTILE DYSFUNCTION DUE TO ARTERIAL INSUFFICIENCY: ICD-10-CM

## 2018-01-16 DIAGNOSIS — A60.02 HERPES SIMPLEX INFECTION OF OTHER SITE OF MALE GENITAL ORGAN: ICD-10-CM

## 2018-01-16 DIAGNOSIS — E78.5 HYPERLIPIDEMIA LDL GOAL <70: ICD-10-CM

## 2018-01-16 PROCEDURE — 36415 COLL VENOUS BLD VENIPUNCTURE: CPT | Performed by: FAMILY MEDICINE

## 2018-01-16 PROCEDURE — 80061 LIPID PANEL: CPT | Performed by: FAMILY MEDICINE

## 2018-01-16 PROCEDURE — 80069 RENAL FUNCTION PANEL: CPT | Performed by: FAMILY MEDICINE

## 2018-01-16 PROCEDURE — 99397 PER PM REEVAL EST PAT 65+ YR: CPT | Performed by: FAMILY MEDICINE

## 2018-01-16 PROCEDURE — 82043 UR ALBUMIN QUANTITATIVE: CPT | Performed by: FAMILY MEDICINE

## 2018-01-16 PROCEDURE — G0103 PSA SCREENING: HCPCS | Performed by: FAMILY MEDICINE

## 2018-01-16 PROCEDURE — 99213 OFFICE O/P EST LOW 20 MIN: CPT | Mod: 25 | Performed by: FAMILY MEDICINE

## 2018-01-16 RX ORDER — TADALAFIL 2.5 MG/1
2.5 TABLET ORAL DAILY
Qty: 90 TABLET | Refills: 11 | Status: SHIPPED | OUTPATIENT
Start: 2018-01-16 | End: 2018-01-19

## 2018-01-16 RX ORDER — SILDENAFIL 100 MG/1
50-100 TABLET, FILM COATED ORAL DAILY PRN
Qty: 6 TABLET | Refills: 11 | Status: CANCELLED | OUTPATIENT
Start: 2018-01-16

## 2018-01-16 RX ORDER — ROSUVASTATIN CALCIUM 20 MG/1
20 TABLET, COATED ORAL DAILY
Qty: 90 TABLET | Refills: 3 | Status: SHIPPED | OUTPATIENT
Start: 2018-01-16 | End: 2018-09-20

## 2018-01-16 RX ORDER — LOSARTAN POTASSIUM AND HYDROCHLOROTHIAZIDE 12.5; 1 MG/1; MG/1
1 TABLET ORAL DAILY
Qty: 90 TABLET | Refills: 1 | Status: SHIPPED | OUTPATIENT
Start: 2018-01-16 | End: 2018-09-24

## 2018-01-16 RX ORDER — VALACYCLOVIR HYDROCHLORIDE 500 MG/1
TABLET, FILM COATED ORAL
Qty: 30 TABLET | Refills: 3 | Status: SHIPPED | OUTPATIENT
Start: 2018-01-16 | End: 2018-09-20

## 2018-01-16 NOTE — NURSING NOTE
"Chief Complaint   Patient presents with     Medicare Visit       Initial /72  Pulse 72  Temp 97.9  F (36.6  C) (Oral)  Ht 5' 10\" (1.778 m)  Wt 179 lb (81.2 kg)  SpO2 99%  BMI 25.68 kg/m2 Estimated body mass index is 25.68 kg/(m^2) as calculated from the following:    Height as of this encounter: 5' 10\" (1.778 m).    Weight as of this encounter: 179 lb (81.2 kg).  Medication Reconciliation: complete  "

## 2018-01-16 NOTE — PROGRESS NOTES
SUBJECTIVE:   Mynor Curry is a 67 year old male who presents for Preventive Visit.      Are you in the first 12 months of your Medicare Part B coverage?  No    Healthy Habits:    Do you get at least three servings of calcium containing foods daily (dairy, green leafy vegetables, etc.)? yes    Amount of exercise or daily activities, outside of work: 3 day(s) per week    Problems taking medications regularly No    Medication side effects: No    Have you had an eye exam in the past two years? yes    Do you see a dentist twice per year? yes    Do you have sleep apnea, excessive snoring or daytime drowsiness?no      Ability to successfully perform activities of daily living: Yes, no assistance needed    Home safety:  lack of grab bars in the bathroom     Hearing impairment: No    Fall risk:  Fallen 2 or more times in the past year?: No  Any fall with injury in the past year?: No        COGNITIVE SCREEN  1) Repeat 3 items (Banana, Sunrise, Chair)      2) Clock draw:   NORMAL  3) 3 item recall:   Recalls 3 objects  Results: 3 items recalled: COGNITIVE IMPAIRMENT LESS LIKELY    Mini-CogTM Copyright S Justine. Licensed by the author for use in [facility] [employer]; reprinted with permission ([email or web address]). All rights reserved.          Hyperlipidemia Follow-Up      Rate your low fat/cholesterol diet?: good    Taking statin?  Yes, no muscle aches from statin    Other lipid medications/supplements?:  none    Hypertension Follow-up      Outpatient blood pressures are not being checked.    Low Salt Diet: no added salt    Notes that he continues to have problems with nocturia secondary to his benign prostatic hyperplasia. He also continues to have erectile dysfunction and wonders if Cialis may help his BPH concerns.    Finally, he is due for a refill on his valacyclovir which she takes for intermittent outbreaks of genital herpes. This works quite well for him.          Reviewed and updated as needed this visit by clinical  staffTobacco  Allergies  Meds  Problems         Reviewed and updated as needed this visit by Provider  Allergies  Meds  Problems        Social History   Substance Use Topics     Smoking status: Never Smoker     Smokeless tobacco: Never Used     Alcohol use 0.0 oz/week     0 Standard drinks or equivalent per week      Comment: 2-3 drinks per week       If you drink alcohol do you typically have >3 drinks per day or >7 drinks per week? No                        Today's PHQ-2 Score:   PHQ-2 ( 1999 Pfizer) 1/16/2018 10/17/2016   Q1: Little interest or pleasure in doing things 0 0   Q2: Feeling down, depressed or hopeless 0 0   PHQ-2 Score 0 0   Q1: Little interest or pleasure in doing things - -   Q2: Feeling down, depressed or hopeless - -   PHQ-2 Score - -         Do you feel safe in your environment - YES    Do you have a Health Care Directive?: Yes: Advance Directive has been received and scanned.    Current providers sharing in care for this patient include: Patient Care Team:  Doug Seo Jr., MD as PCP - General (Family Practice)    The following health maintenance items are reviewed in Epic and correct as of today:  Health Maintenance   Topic Date Due     MICROALBUMIN Q1 YEAR  08/28/2015     FALL RISK ASSESSMENT  10/17/2017     PSA Q1 YR  10/17/2017     BMP Q1 YR  11/22/2017     LIPID MONITORING Q1 YEAR  02/02/2018     ADVANCE DIRECTIVE PLANNING Q5 YRS  08/31/2020     COLON CANCER SCREEN (SYSTEM ASSIGNED)  12/21/2020     TETANUS IMMUNIZATION (SYSTEM ASSIGNED)  06/14/2023     INFLUENZA VACCINE (SYSTEM ASSIGNED)  Completed     PNEUMOCOCCAL  Completed     AORTIC ANEURYSM SCREENING (SYSTEM ASSIGNED)  Completed     HEPATITIS C SCREENING  Completed     Labs reviewed in EPIC  BP Readings from Last 3 Encounters:   01/16/18 118/72   02/02/17 120/76   12/27/16 129/81    Wt Readings from Last 3 Encounters:   01/16/18 179 lb (81.2 kg)   02/02/17 180 lb (81.6 kg)   10/17/16 176 lb (79.8 kg)                 "          ROS:  Constitutional, HEENT, cardiovascular, pulmonary, GI, , musculoskeletal, neuro, skin, endocrine and psych systems are negative, except as otherwise noted.      OBJECTIVE:   /72  Pulse 72  Temp 97.9  F (36.6  C) (Oral)  Ht 5' 10\" (1.778 m)  Wt 179 lb (81.2 kg)  SpO2 99%  BMI 25.68 kg/m2 Estimated body mass index is 25.68 kg/(m^2) as calculated from the following:    Height as of this encounter: 5' 10\" (1.778 m).    Weight as of this encounter: 179 lb (81.2 kg).  EXAM:   GENERAL: healthy, alert and no distress  EYES: Eyes grossly normal to inspection, PERRL and conjunctivae and sclerae normal  HENT: ear canals and TM's normal, nose and mouth without ulcers or lesions  NECK: no adenopathy, no asymmetry, masses, or scars and thyroid normal to palpation  RESP: lungs clear to auscultation - no rales, rhonchi or wheezes  CV: regular rate and rhythm, normal S1 S2, no S3 or S4, no murmur, click or rub, no peripheral edema and peripheral pulses strong  ABDOMEN: soft, nontender, no hepatosplenomegaly, no masses and bowel sounds normal  MS: no gross musculoskeletal defects noted, no edema  SKIN: no suspicious lesions or rashes  NEURO: Normal strength and tone, mentation intact and speech normal  PSYCH: mentation appears normal, affect normal/bright    ASSESSMENT / PLAN:   1. Encounter for routine adult health examination without abnormal findings  Up-to-date on preventative health care concerns including immunizations and cancer screening.    2. Erectile dysfunction due to arterial insufficiency  We will see if this improves with Cialis that were using to treat his BPH symptoms. If this medication is not covered by insurance, consider trial of generic sildenafil.    3. Hyperlipidemia LDL goal <70  Lipids show LDL cholesterol below 70. He stopped tolerating his rosuvastatin without side effects. Follow up with cardiology in 2 months annual visit.  - rosuvastatin (CRESTOR) 20 MG tablet; Take 1 tablet " "(20 mg) by mouth daily  Dispense: 90 tablet; Refill: 3  - Lipid panel reflex to direct LDL Fasting    4. Essential hypertension with goal blood pressure less than 140/90  Blood pressure is at goal today. He is tolerating his combination angiotensin receptor blocker and diuretic without side effects. Continue regular exercise as he has been doing.  - losartan-hydrochlorothiazide (HYZAAR) 100-12.5 MG per tablet; Take 1 tablet by mouth daily  Dispense: 90 tablet; Refill: 1  - Renal panel  - Albumin Random Urine Quantitative with Creat Ratio    5. Benign non-nodular prostatic hyperplasia with lower urinary tract symptoms  We will treat his BPH with lower urinary tract symptoms with Cialis as outlined below. Should this not be covered by his insurance plan could consider different alpha-blocker.  - PSA, screen  - tadalafil (CIALIS) 2.5 MG tablet; Take 1 tablet (2.5 mg) by mouth daily  Dispense: 90 tablet; Refill: 11    6. Herpes simplex infection of other site of male genital organ  Valtrex is renewed for another year.  - valACYclovir (VALTREX) 500 MG tablet; One tablet by mouth twice daily for three days for each outbreak  Dispense: 30 tablet; Refill: 3    End of Life Planning:  Patient currently has an advanced directive: Yes.  Practitioner is supportive of decision.    COUNSELING:  Reviewed preventive health counseling, as reflected in patient instructions        Estimated body mass index is 25.68 kg/(m^2) as calculated from the following:    Height as of this encounter: 5' 10\" (1.778 m).    Weight as of this encounter: 179 lb (81.2 kg).       reports that he has never smoked. He has never used smokeless tobacco.        Appropriate preventive services were discussed with this patient, including applicable screening as appropriate for cardiovascular disease, diabetes, osteopenia/osteoporosis, and glaucoma.  As appropriate for age/gender, discussed screening for colorectal cancer, prostate cancer, breast cancer, and " cervical cancer. Checklist reviewing preventive services available has been given to the patient.    Reviewed patients plan of care and provided an AVS. The Basic Care Plan (routine screening as documented in Health Maintenance) for Mynor meets the Care Plan requirement. This Care Plan has been established and reviewed with the Patient.    Counseling Resources:  ATP IV Guidelines  Pooled Cohorts Equation Calculator  Breast Cancer Risk Calculator  FRAX Risk Assessment  ICSI Preventive Guidelines  Dietary Guidelines for Americans, 2010  USDA's MyPlate  ASA Prophylaxis  Lung CA Screening    Doug Seo Jr, MD  [facility]

## 2018-01-16 NOTE — MR AVS SNAPSHOT
After Visit Summary   1/16/2018    Mynor Curry    MRN: [id number]           Patient Information     Date Of Birth          1950        Visit Information        Provider Department      1/16/2018 11:00 AM Doug Seo Jr., MD [facility]        Today's Diagnoses     Encounter for routine adult health examination without abnormal findings    -  1    Erectile dysfunction due to arterial insufficiency        Hyperlipidemia LDL goal <130        Essential hypertension with goal blood pressure less than 140/90        Benign non-nodular prostatic hyperplasia with lower urinary tract symptoms        Herpes simplex infection of other site of male genital organ          Care Instructions      Preventive Health Recommendations:       Male Ages 65 and over    Yearly exam:             See your health care provider every year in order to  o   Review health changes.   o   Discuss preventive care.    o   Review your medicines if your doctor has prescribed any.    Talk with your health care provider about whether you should have a test to screen for prostate cancer (PSA).    Every 3 years, have a diabetes test (fasting glucose). If you are at risk for diabetes, you should have this test more often.    Every 5 years, have a cholesterol test. Have this test more often if you are at risk for high cholesterol or heart disease.     Every 10 years, have a colonoscopy. Or, have a yearly FIT test (stool test). These exams will check for colon cancer.    Talk to with your health care provider about screening for Abdominal Aortic Aneurysm if you have a family history of AAA or have a history of smoking.  Shots:     Get a flu shot each year.     Get a tetanus shot every 10 years.     Talk to your doctor about your pneumonia vaccines. There are now two you should receive - Pneumovax (PPSV 23) and Prevnar (PCV 13).    Talk to your doctor about a shingles vaccine.     Talk to your doctor about the hepatitis B  vaccine.  Nutrition:     Eat at least 5 servings of fruits and vegetables each day.     Eat whole-grain bread, whole-wheat pasta and brown rice instead of white grains and rice.     Talk to your doctor about Calcium and Vitamin D.   Lifestyle    Exercise for at least 150 minutes a week (30 minutes a day, 5 days a week). This will help you control your weight and prevent disease.     Limit alcohol to one drink per day.     No smoking.     Wear sunscreen to prevent skin cancer.     See your dentist every six months for an exam and cleaning.     See your eye doctor every 1 to 2 years to screen for conditions such as glaucoma, macular degeneration and cataracts.          Follow-ups after your visit        Follow-up notes from your care team     Return in about 1 year (around 1/16/2019) for Preventative Visit.      Your next 10 appointments already scheduled     Jan 19, 2018 11:30 AM CST   [place] Extremity with Diego Hobson PT   [facility] [address] [address]   [phone]            Jan 26, 2018 10:50 AM CST   [place] Extremity with Diego Hobson PT   [facility] [address] [address]   [phone]            Feb 09, 2018 11:30 AM CST   [place] Extremity with Diego Hobson PT   [facility] [address] [address]   [phone]            Apr 19, 2018  8:45 AM CDT   LAB with RU LAB   [facility] ([facility])    [address]  [facility] [address] [phone]           Please do not eat 10-12 hours before your appointment if you are coming in fasting for labs on lipids, cholesterol, or glucose (sugar). This does not apply to pregnant women. Water, hot tea and black coffee (with nothing added) are okay. Do not drink other fluids, diet soda or chew gum.             "Apr 19, 2018  9:45 AM CDT   Return Visit with Alfonso Ng MD   [facility] ([facility])    [address]  [facility] [place] [facility] [address] [phone]              Who to contact     If you have questions or need follow up information about today's clinic visit or your schedule please contact [facility] directly at [phone].  Normal or non-critical lab and imaging results will be communicated to you by Sweetgreenhart, letter or phone within 4 business days after the clinic has received the results. If you do not hear from us within 7 days, please contact the clinic through Ringiot or phone. If you have a critical or abnormal lab result, we will notify you by phone as soon as possible.  Submit refill requests through [email or web address] or call your pharmacy and they will forward the refill request to us. Please allow 3 business days for your refill to be completed.          Additional Information About Your Visit        [email or web address] Information     [email or web address] gives you secure access to your electronic health record. If you see a primary care provider, you can also send messages to your care team and make appointments. If you have questions, please call your primary care clinic.  If you do not have a primary care provider, please call [phone] and they will assist you.        Care EveryWhere ID     This is your Care EveryWhere ID. This could be used by other organizations to access your [facility] medical records  [id number]        Your Vitals Were     Pulse Temperature Height Pulse Oximetry BMI (Body Mass Index)       72 97.9  F (36.6  C) (Oral) 5' 10\" (1.778 m) 99% 25.68 kg/m2        Blood Pressure from Last 3 Encounters:   01/16/18 118/72   02/02/17 120/76   12/27/16 129/81    Weight from Last 3 Encounters:   01/16/18 179 lb (81.2 kg)   02/02/17 180 lb (81.6 kg)   10/17/16 176 lb (79.8 kg)              We Performed the Following     Albumin " Random Urine Quantitative with Creat Ratio     Lipid panel reflex to direct LDL Fasting     PSA, screen     Renal panel          Today's Medication Changes          These changes are accurate as of: 1/16/18 11:48 AM.  If you have any questions, ask your nurse or doctor.               Start taking these medicines.        Dose/Directions    tadalafil 2.5 MG tablet   Commonly known as:  CIALIS   Used for:  Benign non-nodular prostatic hyperplasia with lower urinary tract symptoms   Started by:  Doug Seo Jr., MD        Dose:  2.5 mg   Take 1 tablet (2.5 mg) by mouth daily   Quantity:  90 tablet   Refills:  11         Stop taking these medicines if you haven't already. Please contact your care team if you have questions.     sildenafil 100 MG tablet   Commonly known as:  VIAGRA   Stopped by:  Doug Seo Jr., MD                Where to get your medicines      These medications were sent to [facility] - [place] [address]   [address] [facility] [address]    Hours:  Mon-Fri 8:30am-5:00pm Toll Free [phone] Phone:  [phone]     losartan-hydrochlorothiazide 100-12.5 MG per tablet    rosuvastatin 20 MG tablet    tadalafil 2.5 MG tablet    valACYclovir 500 MG tablet                Primary Care Provider Office Phone # Fax #    Doug Seo Jr., [phone] [address] [address]        Equal Access to Services     [facility]: Hadii clem oneal hadasho Soomaali, waaxda luqadaha, qaybta kaalmada adeegyada, waxay apurva perez . So [facility] [phone].    ATENCIÓN: Si habla español, tiene a salas disposición servicios gratuitos de asistencia lingüística. Llame al [phone].    We comply with applicable federal civil rights laws and [place] laws. We do not discriminate on the basis of race, color, national origin, age, disability, sex, sexual orientation, or gender identity.             Thank you!     [facility] you for [facility]  for your care. Our goal is always to provide you with excellent care. Hearing back from our patients is one way we can continue to improve our services. Please take a few minutes to complete the written survey that you may receive in the mail after your visit with us. Thank you!             Your Updated Medication List - Protect others around you: Learn how to safely use, store and throw away your medicines at www.disposemymeds.org.          This list is accurate as of: 1/16/18 11:48 AM.  Always use your most recent med list.                   Brand Name Dispense Instructions for use Diagnosis    ADVIL PO      Take 400 mg by mouth every 4 hours as needed        aspirin EC 81 MG EC tablet      Take 1 tablet (81 mg) by mouth daily    Occlusion and stenosis of carotid artery without mention of cerebral infarction, Abnormal cardiovascular stress test       * DAILY MULTIVITAMIN PO      Take by mouth daily        * multivitamin  with lutein Caps per capsule      Take 1 capsule by mouth daily        famotidine 20 MG tablet    PEPCID     Take 20 mg by mouth 2 times daily        losartan-hydrochlorothiazide 100-12.5 MG per tablet    HYZAAR    90 tablet    Take 1 tablet by mouth daily    Essential hypertension with goal blood pressure less than 140/90       rosuvastatin 20 MG tablet    CRESTOR    90 tablet    Take 1 tablet (20 mg) by mouth daily    Hyperlipidemia LDL goal <130       tadalafil 2.5 MG tablet    CIALIS    90 tablet    Take 1 tablet (2.5 mg) by mouth daily    Benign non-nodular prostatic hyperplasia with lower urinary tract symptoms       valACYclovir 500 MG tablet    VALTREX    30 tablet    One tablet by mouth twice daily for three days for each outbreak    Herpes simplex infection of other site of male genital organ       vitamin D 2000 UNITS Caps      Take by mouth daily        * Notice:  This list has 2 medication(s) that are the same as other  medications prescribed for you. Read the directions carefully, and ask your doctor or other care provider to review them with you.

## 2018-01-17 LAB
ALBUMIN SERPL-MCNC: 4.3 G/DL (ref 3.4–5)
ANION GAP SERPL CALCULATED.3IONS-SCNC: 6 MMOL/L (ref 3–14)
BUN SERPL-MCNC: 17 MG/DL (ref 7–30)
CALCIUM SERPL-MCNC: 9 MG/DL (ref 8.5–10.1)
CHLORIDE SERPL-SCNC: 105 MMOL/L (ref 94–109)
CHOLEST SERPL-MCNC: 128 MG/DL
CO2 SERPL-SCNC: 28 MMOL/L (ref 20–32)
CREAT SERPL-MCNC: 0.84 MG/DL (ref 0.66–1.25)
GFR SERPL CREATININE-BSD FRML MDRD: >90 ML/MIN/1.7M2
GLUCOSE SERPL-MCNC: 86 MG/DL (ref 70–99)
HDLC SERPL-MCNC: 67 MG/DL
LDLC SERPL CALC-MCNC: 47 MG/DL
NONHDLC SERPL-MCNC: 61 MG/DL
PHOSPHATE SERPL-MCNC: 2.8 MG/DL (ref 2.5–4.5)
POTASSIUM SERPL-SCNC: 4.6 MMOL/L (ref 3.4–5.3)
PSA SERPL-ACNC: 2.24 UG/L (ref 0–4)
SODIUM SERPL-SCNC: 139 MMOL/L (ref 133–144)
TRIGL SERPL-MCNC: 72 MG/DL

## 2018-01-18 LAB
CREAT UR-MCNC: 141 MG/DL
MICROALBUMIN UR-MCNC: 9 MG/L
MICROALBUMIN/CREAT UR: 6.57 MG/G CR (ref 0–17)

## 2018-01-18 NOTE — PROGRESS NOTES
Mr. Curry,    -Kidney function is normal (Cr, GFR), Sodium is normal, Potassium is normal, Calcium is normal, Glucose is normal (diabetes screening test).   -Cholesterol levels are at your goal levels.  ADVISE: Continuing your medication, a regular exercise program with at least 30 minutes of aerobic exercise 3-4 days/week ( 45 minutes 4-6 days/week if weight loss needed), and a low saturated fat,/low carbohydrate diet are helpful to maintain this.  Rechecking your fasting cholesterol panel in 12 months is recommended (Lipid w/ LDL reflex).  -PSA (prostate specific antigen) test is normal.  This indicates a low likelihood of prostate cancer.  ADVISE: yearly recheck?  As we discussed, data is limited on PSA testing past the age of 69 and certainly past the age of 75.  We can talk in a year and make our decision.   -Microalbumin (urine protein) test is normal.  ADVISE: recheck annually    If you have further questions about the interpretation of your labs, labtestsonline.org is a good website to check out for further information.    Please contact the clinic if you have additional questions.  Thank you.    Sincerely,    Doug Seo MD

## 2018-01-19 ENCOUNTER — THERAPY VISIT (OUTPATIENT)
Dept: PHYSICAL THERAPY | Facility: CLINIC | Age: 68
End: 2018-01-19
Payer: COMMERCIAL

## 2018-01-19 ENCOUNTER — TELEPHONE (OUTPATIENT)
Dept: FAMILY MEDICINE | Facility: CLINIC | Age: 68
End: 2018-01-19

## 2018-01-19 DIAGNOSIS — M25.512 ACUTE PAIN OF LEFT SHOULDER: Primary | ICD-10-CM

## 2018-01-19 DIAGNOSIS — M76.51 PATELLAR TENDONITIS OF RIGHT KNEE: ICD-10-CM

## 2018-01-19 DIAGNOSIS — N52.01 ERECTILE DYSFUNCTION DUE TO ARTERIAL INSUFFICIENCY: ICD-10-CM

## 2018-01-19 PROCEDURE — 97110 THERAPEUTIC EXERCISES: CPT | Mod: GP | Performed by: PHYSICAL THERAPIST

## 2018-01-19 PROCEDURE — 97035 APP MDLTY 1+ULTRASOUND EA 15: CPT | Mod: GP | Performed by: PHYSICAL THERAPIST

## 2018-01-19 PROCEDURE — 97164 PT RE-EVAL EST PLAN CARE: CPT | Mod: GP | Performed by: PHYSICAL THERAPIST

## 2018-01-19 RX ORDER — SILDENAFIL 100 MG/1
50-100 TABLET, FILM COATED ORAL DAILY PRN
Qty: 6 TABLET | Refills: 11 | Status: SHIPPED | OUTPATIENT
Start: 2018-01-19 | End: 2019-09-11

## 2018-01-19 NOTE — TELEPHONE ENCOUNTER
Central Prior Authorization Team   Phone: [phone]    PRIOR AUTHORIZATION DENIED    Medication: Cialis - INITIATED - DENIED    Denial Date: 1/19/2018    Denial Rational: Drug is non-formulary - See below for preferred drugs      Appeal Information:

## 2018-01-19 NOTE — PROGRESS NOTES
Subjective:  HPI                    Objective:  System    Physical Exam    General     ROS    Assessment/Plan:    PROGRESS  REPORT    Progress reporting period is from 8/28/17 to 1/19/18.       SUBJECTIVE  Subjective changes noted by patient:  Mynor returns to PT for his left shoulder.  He experienced significant shoulder pain in mid December while shoveling snow when the shovel jammed against a crack in the sidewalk.  He has since had an MRI (see results scanned into Epic) and received a third cortizone injection.  He reports minimal improvement with the injection.  He has pain with pushing and lifting overhead.       Current pain level is 7/10  .     Previous pain level was  NA  .   Changes in function:  None  Adverse reaction to treatment or activity: activity - shoveling    OBJECTIVE  Changes noted in objective findings:  AROM: Flex= pain at 100 degrees.  Abd= pain at 92 degrees.  PROM: Full ER.  IR with moderate loss.  MMT:  IR is weak and painful, Abd and empty can are strong with slight pain.  Speed's test is weak on the left.          ASSESSMENT/PLAN  Updated problem list and treatment plan: Diagnosis 1:  Left shoulder partial RTC tear with tendonitis  Pain -  hot/cold therapy, US, education and home program  Decreased ROM/flexibility - therapeutic exercise and home program  Decreased strength - therapeutic exercise, therapeutic activities and home program  STG/LTGs have been met or progress has been made towards goals:  None  Assessment of Progress: The patient's condition has potential to improve.  Self Management Plans:  Patient has been instructed in a home treatment program.    Mynor continues to require the following intervention to meet STG and LTG's:  PT    Recommendations:  This patient would benefit from continued therapy.     Frequency:  2 X week, once daily  Duration:  for 4 weeks          Please refer to the daily flowsheet for treatment today, total treatment time and time spent performing 1:1  timed codes.

## 2018-01-19 NOTE — TELEPHONE ENCOUNTER
Prior Authorization Retail Medication Request  Medication/Dose: Cialis  Diagnosis and ICD code: Benign non-nodular prostatic hyperplasia with lower urinary tract symptoms [N40.1]   New/Renewal/Insurance Change PA: new  Previously Tried and Failed Therapies:     Insurance ID (if provided): not provided  Insurance Phone (if provided): not provided    Any additional info from fax request: Came with CMM key of [id number]    If you received a fax notification from an outside Pharmacy:  [facility]  Pharmacy #:[phone]  Pharmacy Fax:[phone]    Prescriber please advise on change of medication or initiate PA>  Barb Michele

## 2018-01-19 NOTE — LETTER
[facility]  [address]  [facility] [address]  [phone]    2018    Re: Mynor Curry   :   1950  MRN:  [id number]   REFERRING PHYSICIAN:   Linda Bauer    [facility]  Date of Initial Evaluation:     18  Visits:  Rxs Used: 2  Reason for Referral:    Patellar tendonitis of right knee  Acute pain of left shoulder  PROGRESS  REPORT  Progress reporting period is from 17 to 18.     SUBJECTIVE  Subjective changes noted by patient:  Mynor returns to PT for his left shoulder.  He experienced significant shoulder pain in mid December while shoveling snow when the shovel jammed against a crack in the sidewalk.  He has since had an MRI (see results scanned into Epic) and received a third cortizone injection.  He reports minimal improvement with the injection.  He has pain with pushing and lifting overhead.       Current pain level is 7/10  .     Previous pain level was  NA  .   Changes in function:  None  Adverse reaction to treatment or activity: activity - shoveling  OBJECTIVE  Changes noted in objective findings:  AROM: Flex= pain at 100 degrees.  Abd= pain at 92 degrees.  PROM: Full ER.  IR with moderate loss.  MMT:  IR is weak and painful, Abd and empty can are strong with slight pain.  Speed's test is weak on the left.        ASSESSMENT/PLAN  Updated problem list and treatment plan: Diagnosis 1:  Left shoulder partial RTC tear with tendonitis  Pain -  hot/cold therapy, US, education and home program  Decreased ROM/flexibility - therapeutic exercise and home program  Decreased strength - therapeutic exercise, therapeutic activities and home program  STG/LTGs have been met or progress has been made towards goals:  None  Assessment of Progress: The patient's condition has potential to improve.  Self Management Plans:  Patient has been instructed in a home treatment program.  Mynor continues to require the following  intervention to meet STG and LTG's:  PT    Recommendations:  This patient would benefit from continued therapy.     Frequency:  2 X week, once daily  Duration:  for 4 weeks    Thank you for your referral.    INQUIRIES  Therapist:   Diego Hobson PT  [place] [facility]  [address] [facility] [address]  Phone: [phone]  Fax: [phone]

## 2018-01-19 NOTE — TELEPHONE ENCOUNTER
We'll change medications to Viagra.  Chart reviewed.  Rx sent to pt's preferred pharmacy.    [facility] - [place], [place] - [address]

## 2018-01-19 NOTE — TELEPHONE ENCOUNTER
PA Initiation    Medication: Cialis - INITIATED  Insurance Company: [employer] - Phone [phone] Fax [phone]  Pharmacy Filling the Rx: [facility] - [place], [place] - [facility] [address]  Filling Pharmacy Phone: [phone]  Filling Pharmacy Fax: [phone]  Start Date: 1/19/2018

## 2018-01-19 NOTE — TELEPHONE ENCOUNTER
PA team see below.  Please initiate PA.  Pt uses this medication for BPH not ED.  Barb Michele

## 2018-01-19 NOTE — TELEPHONE ENCOUNTER
Please initiate PA.  I am not aware we have tried other treatments for his BPH if that assists you in completing the PA request.    Doug Seo MD

## 2018-01-23 ENCOUNTER — MYC MEDICAL ADVICE (OUTPATIENT)
Dept: FAMILY MEDICINE | Facility: CLINIC | Age: 68
End: 2018-01-23

## 2018-01-26 ENCOUNTER — THERAPY VISIT (OUTPATIENT)
Dept: PHYSICAL THERAPY | Facility: CLINIC | Age: 68
End: 2018-01-26
Payer: COMMERCIAL

## 2018-01-26 DIAGNOSIS — M76.51 PATELLAR TENDONITIS OF RIGHT KNEE: ICD-10-CM

## 2018-01-26 DIAGNOSIS — M25.512 ACUTE PAIN OF LEFT SHOULDER: ICD-10-CM

## 2018-01-26 PROCEDURE — 97110 THERAPEUTIC EXERCISES: CPT | Mod: GP | Performed by: PHYSICAL THERAPIST

## 2018-01-26 PROCEDURE — 97035 APP MDLTY 1+ULTRASOUND EA 15: CPT | Mod: GP | Performed by: PHYSICAL THERAPIST

## 2018-02-07 ENCOUNTER — THERAPY VISIT (OUTPATIENT)
Dept: PHYSICAL THERAPY | Facility: CLINIC | Age: 68
End: 2018-02-07
Payer: COMMERCIAL

## 2018-02-07 DIAGNOSIS — M25.512 ACUTE PAIN OF LEFT SHOULDER: ICD-10-CM

## 2018-02-07 DIAGNOSIS — M76.51 PATELLAR TENDONITIS OF RIGHT KNEE: ICD-10-CM

## 2018-02-07 PROCEDURE — 97110 THERAPEUTIC EXERCISES: CPT | Mod: GP | Performed by: PHYSICAL THERAPIST

## 2018-02-07 PROCEDURE — 97035 APP MDLTY 1+ULTRASOUND EA 15: CPT | Mod: GP | Performed by: PHYSICAL THERAPIST

## 2018-02-07 NOTE — LETTER
[facility]  [address]  [facility] [address]  [phone]    2018    Re: Mynor Curry   :   1950  MRN:  [id number]   REFERRING PHYSICIAN:   Linda Bauer    [facility]  Date of Initial Evaluation:  2018  Visits:  Rxs Used: 4  Reason for Referral:     Patellar tendonitis of right knee  Acute pain of left shoulder    PROGRESS  REPORT  Progress reporting period is from 18 to 18 (4 visits).       SUBJECTIVE  Subjective changes noted by patient:  Al is reporting no knee pain now, but developed pain in his lateral thigh running across his distal quadricep after snowblowing on 18.  The pain was constant, but has changed within the last week.  He now reports intermittent pain in the anterior > lateral right hip.  Felt when walking or standing.         Current pain level is 4/10  .     Previous pain level was n/a   .   Changes in function:  Yes (See Goal flowsheet attached for changes in current functional level)  Adverse reaction to treatment or activity: activity - snowblowing    OBJECTIVE  Changes noted in objective findings:  Right knee checks out normal.  Checks of the right hip and low back today to determine the cause of the hip pain are inconclusive, but point more toward his hip as the cause of the pain.    ASSESSMENT/PLAN  Updated problem list and treatment plan: Diagnosis 1:  Right knee infrapatellar tendonitis    STG/LTGs have been met or progress has been made towards goals:  Yes (See Goal flow sheet completed today.)  Assessment of Progress: The patient's condition is improving.  Self Management Plans:  Patient has been instructed in a home treatment program.  Mynor continues to require the following intervention to meet STG and LTG's:  PT is no longer needed for his knee, but may be warranted pending the cause of the hip pain.    Recommendations:  This patient would benefit from further  evaluation.          Re: Mynor Curry   :   1950                            Thank you for your referral.    INQUIRIES  Therapist: Diego Hobson [facility]  [address]  [facility] [address]  Phone: [phone]  Fax: [phone]

## 2018-02-08 ENCOUNTER — TRANSFERRED RECORDS (OUTPATIENT)
Dept: HEALTH INFORMATION MANAGEMENT | Facility: CLINIC | Age: 68
End: 2018-02-08

## 2018-02-14 ENCOUNTER — TRANSFERRED RECORDS (OUTPATIENT)
Dept: HEALTH INFORMATION MANAGEMENT | Facility: CLINIC | Age: 68
End: 2018-02-14

## 2018-02-16 ENCOUNTER — THERAPY VISIT (OUTPATIENT)
Dept: PHYSICAL THERAPY | Facility: CLINIC | Age: 68
End: 2018-02-16
Payer: COMMERCIAL

## 2018-02-16 DIAGNOSIS — M25.512 ACUTE PAIN OF LEFT SHOULDER: ICD-10-CM

## 2018-02-16 PROCEDURE — 97035 APP MDLTY 1+ULTRASOUND EA 15: CPT | Mod: GP | Performed by: PHYSICAL THERAPIST

## 2018-02-16 PROCEDURE — 97110 THERAPEUTIC EXERCISES: CPT | Mod: GP | Performed by: PHYSICAL THERAPIST

## 2018-02-21 ENCOUNTER — THERAPY VISIT (OUTPATIENT)
Dept: PHYSICAL THERAPY | Facility: CLINIC | Age: 68
End: 2018-02-21
Payer: COMMERCIAL

## 2018-02-21 DIAGNOSIS — M25.512 ACUTE PAIN OF LEFT SHOULDER: ICD-10-CM

## 2018-02-21 DIAGNOSIS — M76.51 PATELLAR TENDONITIS OF RIGHT KNEE: ICD-10-CM

## 2018-02-21 PROCEDURE — 97035 APP MDLTY 1+ULTRASOUND EA 15: CPT | Mod: GP | Performed by: PHYSICAL THERAPIST

## 2018-02-21 PROCEDURE — 97110 THERAPEUTIC EXERCISES: CPT | Mod: GP | Performed by: PHYSICAL THERAPIST

## 2018-02-28 ENCOUNTER — THERAPY VISIT (OUTPATIENT)
Dept: PHYSICAL THERAPY | Facility: CLINIC | Age: 68
End: 2018-02-28
Payer: COMMERCIAL

## 2018-02-28 DIAGNOSIS — M76.51 PATELLAR TENDONITIS OF RIGHT KNEE: ICD-10-CM

## 2018-02-28 PROCEDURE — 97035 APP MDLTY 1+ULTRASOUND EA 15: CPT | Mod: GP | Performed by: PHYSICAL THERAPIST

## 2018-02-28 PROCEDURE — 97110 THERAPEUTIC EXERCISES: CPT | Mod: GP | Performed by: PHYSICAL THERAPIST

## 2018-03-21 ENCOUNTER — THERAPY VISIT (OUTPATIENT)
Dept: PHYSICAL THERAPY | Facility: CLINIC | Age: 68
End: 2018-03-21
Payer: COMMERCIAL

## 2018-03-21 DIAGNOSIS — M25.512 ACUTE PAIN OF LEFT SHOULDER: ICD-10-CM

## 2018-03-21 DIAGNOSIS — M76.51 PATELLAR TENDONITIS OF RIGHT KNEE: ICD-10-CM

## 2018-03-21 PROCEDURE — 97110 THERAPEUTIC EXERCISES: CPT | Mod: GP | Performed by: PHYSICAL THERAPIST

## 2018-03-21 PROCEDURE — 97035 APP MDLTY 1+ULTRASOUND EA 15: CPT | Mod: GP | Performed by: PHYSICAL THERAPIST

## 2018-03-28 ENCOUNTER — THERAPY VISIT (OUTPATIENT)
Dept: PHYSICAL THERAPY | Facility: CLINIC | Age: 68
End: 2018-03-28
Payer: COMMERCIAL

## 2018-03-28 DIAGNOSIS — M76.51 PATELLAR TENDONITIS OF RIGHT KNEE: ICD-10-CM

## 2018-03-28 DIAGNOSIS — M25.512 ACUTE PAIN OF LEFT SHOULDER: ICD-10-CM

## 2018-03-28 PROCEDURE — 97110 THERAPEUTIC EXERCISES: CPT | Mod: GP | Performed by: PHYSICAL THERAPIST

## 2018-03-28 PROCEDURE — 97035 APP MDLTY 1+ULTRASOUND EA 15: CPT | Mod: GP | Performed by: PHYSICAL THERAPIST

## 2018-03-28 ASSESSMENT — ACTIVITIES OF DAILY LIVING (ADL)
STAND: ACTIVITY IS NOT DIFFICULT
HOW_WOULD_YOU_RATE_THE_CURRENT_FUNCTION_OF_YOUR_KNEE_DURING_YOUR_USUAL_DAILY_ACTIVITIES_ON_A_SCALE_FROM_0_TO_100_WITH_100_BEING_YOUR_LEVEL_OF_KNEE_FUNCTION_PRIOR_TO_YOUR_INJURY_AND_0_BEING_THE_INABILITY_TO_PERFORM_ANY_OF_YOUR_USUAL_DAILY_ACTIVITIES?: 100
HOW_WOULD_YOU_RATE_THE_OVERALL_FUNCTION_OF_YOUR_KNEE_DURING_YOUR_USUAL_DAILY_ACTIVITIES?: NEARLY NORMAL
STIFFNESS: I DO NOT HAVE THE SYMPTOM
GO UP STAIRS: ACTIVITY IS NOT DIFFICULT
WALK: ACTIVITY IS NOT DIFFICULT
GIVING WAY, BUCKLING OR SHIFTING OF KNEE: I DO NOT HAVE THE SYMPTOM
PAIN: I DO NOT HAVE THE SYMPTOM
LIMPING: I DO NOT HAVE THE SYMPTOM
KNEE_ACTIVITY_OF_DAILY_LIVING_SCORE: 100
KNEE_ACTIVITY_OF_DAILY_LIVING_SUM: 70
SWELLING: I DO NOT HAVE THE SYMPTOM
WEAKNESS: I DO NOT HAVE THE SYMPTOM
SQUAT: ACTIVITY IS NOT DIFFICULT
GO DOWN STAIRS: ACTIVITY IS NOT DIFFICULT
RAW_SCORE: 70
RISE FROM A CHAIR: ACTIVITY IS NOT DIFFICULT
KNEEL ON THE FRONT OF YOUR KNEE: ACTIVITY IS NOT DIFFICULT
SIT WITH YOUR KNEE BENT: ACTIVITY IS NOT DIFFICULT
AS_A_RESULT_OF_YOUR_KNEE_INJURY,_HOW_WOULD_YOU_RATE_YOUR_CURRENT_LEVEL_OF_DAILY_ACTIVITY?: NORMAL

## 2018-03-28 NOTE — PROGRESS NOTES
Subjective:  HPI                    Objective:  System    Physical Exam    General     ROS    Assessment/Plan:    DISCHARGE REPORT    Progress reporting period is from 1/19/18 to 3/28/18 .       SUBJECTIVE  Subjective changes noted by patient:  Al continues to have left shoulder pain.  There had been moderate improvement prior to reaggravating the shoulder 2 weeks ago while pushing himself down a water slide in [place].  Since then, he has had increased pain again with interrupted sleep, difficulty pushing, and painful reaching above the head.       Current pain level is 8/10  .     Previous pain level was  7/10  .   Changes in function:  Previous functional gains have been lost due to recent exacerbation.  Adverse reaction to treatment or activity: activity - pushing      OBJECTIVE  Changes noted in objective findings:  Pain at end range flexion and abduction, with limited horizontal adduction.  Painful and weak shoulder ER and empty can.  Painful resisted flexion.        ASSESSMENT/PLAN  Updated problem list and treatment plan: Diagnosis 1:  Left shoulder partial RTC tear and tendonitis    STG/LTGs have been met or progress has been made towards goals:  None  Assessment of Progress: The patient's condition has exacerbated.  Self Management Plans:  Patient has been instructed in a home treatment program.    Mynor continues to require the following intervention to meet STG and LTG's:  PT intervention is no longer required to meet STG/LTG.    Recommendations:  This patient is ready to be discharged from therapy and continue their home treatment program.  Pt has been advised to return to MD for reassessment of his shoulder.    Please refer to the daily flowsheet for treatment today, total treatment time and time spent performing 1:1 timed codes.

## 2018-03-28 NOTE — LETTER
[facility] ATHLETIC [facility]  [address]  [facility] [address]  [phone]    2018    Re: Mynor Curry   :   1950  MRN:  [id number]   REFERRING PHYSICIAN:   Jonas Wheeler    [facility]    Date of Initial Evaluation:  18  Visits:  Rxs Used: 8  Reason for Referral:     Patellar tendonitis of right knee  Acute pain of left shoulder    DISCHARGE REPORT  Progress reporting period is from 18 to 3/28/18 .     SUBJECTIVE  Subjective changes noted by patient:  Al continues to have left shoulder pain.  There had been moderate improvement prior to reaggravating the shoulder 2 weeks ago while pushing himself down a water slide in [place].  Since then, he has had increased pain again with interrupted sleep, difficulty pushing, and painful reaching above the head.       Current pain level is 8/10  .     Previous pain level was  7/10  .   Changes in function:  Previous functional gains have been lost due to recent exacerbation.  Adverse reaction to treatment or activity: activity - pushing  OBJECTIVE  Changes noted in objective findings:  Pain at end range flexion and abduction, with limited horizontal adduction.  Painful and weak shoulder ER and empty can.  Painful resisted flexion.  ASSESSMENT/PLAN  Updated problem list and treatment plan: Diagnosis 1:  Left shoulder partial RTC tear and tendonitis    STG/LTGs have been met or progress has been made towards goals:  None  Assessment of Progress: The patient's condition has exacerbated.  Self Management Plans:  Patient has been instructed in a home treatment program.  Mynor continues to require the following intervention to meet STG and LTG's:  PT intervention is no longer required to meet STG/LTG.  Recommendations:  This patient is ready to be discharged from therapy and continue their home treatment program.  Pt has been advised to return to MD for reassessment of his shoulder.    Thank you for your  referral.    INQUIRIES  Therapist: Diego Hobson PT  [facility] [place]  [address] [facility] [address]  Phone: [phone]  Fax: [phone]

## 2018-04-04 ENCOUNTER — TRANSFERRED RECORDS (OUTPATIENT)
Dept: HEALTH INFORMATION MANAGEMENT | Facility: CLINIC | Age: 68
End: 2018-04-04

## 2018-04-04 PROBLEM — M25.512 ACUTE PAIN OF LEFT SHOULDER: Status: RESOLVED | Noted: 2018-01-19 | Resolved: 2018-04-04

## 2018-04-04 PROBLEM — M76.51 PATELLAR TENDONITIS OF RIGHT KNEE: Status: RESOLVED | Noted: 2018-01-12 | Resolved: 2018-04-04

## 2018-04-05 ENCOUNTER — OFFICE VISIT (OUTPATIENT)
Dept: FAMILY MEDICINE | Facility: CLINIC | Age: 68
End: 2018-04-05
Payer: COMMERCIAL

## 2018-04-05 VITALS
OXYGEN SATURATION: 98 % | BODY MASS INDEX: 25.91 KG/M2 | TEMPERATURE: 98.3 F | HEIGHT: 70 IN | DIASTOLIC BLOOD PRESSURE: 59 MMHG | SYSTOLIC BLOOD PRESSURE: 98 MMHG | WEIGHT: 181 LBS | HEART RATE: 87 BPM

## 2018-04-05 DIAGNOSIS — Z01.818 PREOP GENERAL PHYSICAL EXAM: Primary | ICD-10-CM

## 2018-04-05 LAB
ANION GAP SERPL CALCULATED.3IONS-SCNC: 6 MMOL/L (ref 3–14)
BUN SERPL-MCNC: 15 MG/DL (ref 7–30)
CALCIUM SERPL-MCNC: 8.9 MG/DL (ref 8.5–10.1)
CHLORIDE SERPL-SCNC: 104 MMOL/L (ref 94–109)
CO2 SERPL-SCNC: 26 MMOL/L (ref 20–32)
CREAT SERPL-MCNC: 0.77 MG/DL (ref 0.66–1.25)
GFR SERPL CREATININE-BSD FRML MDRD: >90 ML/MIN/1.7M2
GLUCOSE SERPL-MCNC: 102 MG/DL (ref 70–99)
HGB BLD-MCNC: 14.2 G/DL (ref 13.3–17.7)
POTASSIUM SERPL-SCNC: 3.9 MMOL/L (ref 3.4–5.3)
SODIUM SERPL-SCNC: 136 MMOL/L (ref 133–144)

## 2018-04-05 PROCEDURE — 85018 HEMOGLOBIN: CPT | Performed by: FAMILY MEDICINE

## 2018-04-05 PROCEDURE — 93000 ELECTROCARDIOGRAM COMPLETE: CPT | Performed by: FAMILY MEDICINE

## 2018-04-05 PROCEDURE — 99215 OFFICE O/P EST HI 40 MIN: CPT | Performed by: FAMILY MEDICINE

## 2018-04-05 PROCEDURE — 36415 COLL VENOUS BLD VENIPUNCTURE: CPT | Performed by: FAMILY MEDICINE

## 2018-04-05 PROCEDURE — 80048 BASIC METABOLIC PNL TOTAL CA: CPT | Performed by: FAMILY MEDICINE

## 2018-04-05 NOTE — MR AVS SNAPSHOT
After Visit Summary   4/5/2018    Mynor Curry    MRN: [id number]           Patient Information     Date Of Birth          1950        Visit Information        Provider Department      4/5/2018 10:40 AM Gurmeet Urbina,  [facility]        Today's Diagnoses     Preop general physical exam    -  1      Care Instructions      Before Your Surgery      Call your surgeon if there is any change in your health. This includes signs of a cold or flu (such as a sore throat, runny nose, cough, rash or fever).    Do not smoke, drink alcohol or take over the counter medicine (unless your surgeon or primary care doctor tells you to) for the 24 hours before and after surgery.    If you take prescribed drugs: Follow your doctor s orders about which medicines to take and which to stop until after surgery.    Eating and drinking prior to surgery: follow the instructions from your surgeon    Take a shower or bath the night before surgery. Use the soap your surgeon gave you to gently clean your skin. If you do not have soap from your surgeon, use your regular soap. Do not shave or scrub the surgery site.  Wear clean pajamas and have clean sheets on your bed.           Follow-ups after your visit        Follow-up notes from your care team     Return for as needed.      Your next 10 appointments already scheduled     Apr 13, 2018 10:50 AM CDT   (Arrive by 10:35 AM)   [place] Extremity with Diego Hobson PT   [facility] [place] ([facility])    [address] [place] [address] [phone]            Apr 18, 2018 10:50 AM CDT   [place] Extremity with Diego Hobson PT   [facility]    [address] [place] [address]   [phone]            Apr 19, 2018  8:45 AM CDT   LAB with [facility]   [facility] ([facility])    [address] [facility] [address] [place] [address]    [phone]           Please do not eat 10-12 hours before your appointment if you are coming in fasting for labs on lipids, cholesterol, or glucose (sugar). This does not apply to pregnant women. Water, hot tea and black coffee (with nothing added) are okay. Do not drink other fluids, diet soda or chew gum.            Apr 19, 2018  9:45 AM CDT   Return Visit with Alfonso Ng MD   [facility] ([facility])    [address] [facility] [address]  [facility] [id number] [phone]            Apr 20, 2018 10:50 AM CDT   [place] Extremity with Diego Hobson PT   [facility] [place] [facility]    [address] [facility] [address] [phone]              Who to contact     If you have questions or need follow up information about today's clinic visit or your schedule please contact [facility] [place] directly at [phone].  Normal or non-critical lab and imaging results will be communicated to you by MyChart, letter or phone within 4 business days after the clinic has received the results. If you do not hear from us within 7 days, please contact the clinic through [employer] or phone. If you have a critical or abnormal lab result, we will notify you by phone as soon as possible.  Submit refill requests through [employer] or call your pharmacy and they will forward the refill request to us. Please allow 3 business days for your refill to be completed.          Additional Information About Your Visit        [employer] Information     [employer] gives you secure access to your electronic health record. If you see a primary care provider, you can also send messages to your care team and make appointments. If you have questions, please call your primary care clinic.  If you do not have a primary care provider, please call [phone] and they will assist you.        Care EveryWhere ID     This is your Care EveryWhere ID. This could  "be used by other organizations to access your [facility] medical records  [id number]        Your Vitals Were     Pulse Temperature Height Pulse Oximetry BMI (Body Mass Index)       87 98.3  F (36.8  C) (Oral) 5' 10\" (1.778 m) 98% 25.97 kg/m2        Blood Pressure from Last 3 Encounters:   04/05/18 98/59   01/16/18 118/72   02/02/17 120/76    Weight from Last 3 Encounters:   04/05/18 181 lb (82.1 kg)   01/16/18 179 lb (81.2 kg)   02/02/17 180 lb (81.6 kg)              We Performed the Following     Basic metabolic panel     EKG 12-lead complete w/read - Clinics     Hemoglobin        Primary Care Provider Office Phone # Fax #    Doug Seo Jr., [phone] [address]  [place] [address]        Equal Access to Services     [facility]: Hadii aad ku hadasho Soomaali, waaxda luqadaha, qaybta kaalmada adeegyada, waxay idiin hayaan ramiro perez . So [facility] [phone].    ATENCIÓN: Si habla español, tiene a salas disposición servicios gratuitos de asistencia lingüística. Joesph al [phone].    We comply with applicable federal civil rights laws and [place] laws. We do not discriminate on the basis of race, color, national origin, age, disability, sex, sexual orientation, or gender identity.            Thank you!     Thank you for choosing [facility]  for your care. Our goal is always to provide you with excellent care. Hearing back from our patients is one way we can continue to improve our services. Please take a few minutes to complete the written survey that you may receive in the mail after your visit with us. Thank you!             Your Updated Medication List - Protect others around you: Learn how to safely use, store and throw away your medicines at www.disposemymeds.org.          This list is accurate as of 4/5/18 11:18 AM.  Always use your most recent med list.                   Brand Name Dispense Instructions for use Diagnosis    ADVIL PO      Take 400 " mg by mouth every 4 hours as needed        aspirin EC 81 MG EC tablet      Take 1 tablet (81 mg) by mouth daily    Occlusion and stenosis of carotid artery without mention of cerebral infarction, Abnormal cardiovascular stress test       * DAILY MULTIVITAMIN PO      Take by mouth daily        * multivitamin  with lutein Caps per capsule      Take 1 capsule by mouth daily        famotidine 20 MG tablet    PEPCID     Take 20 mg by mouth 2 times daily        losartan-hydrochlorothiazide 100-12.5 MG per tablet    HYZAAR    90 tablet    Take 1 tablet by mouth daily    Essential hypertension with goal blood pressure less than 140/90       rosuvastatin 20 MG tablet    CRESTOR    90 tablet    Take 1 tablet (20 mg) by mouth daily    Hyperlipidemia LDL goal <130       sildenafil 100 MG tablet    VIAGRA    6 tablet    Take 0.5-1 tablets ( mg) by mouth daily as needed    Erectile dysfunction due to arterial insufficiency       valACYclovir 500 MG tablet    VALTREX    30 tablet    One tablet by mouth twice daily for three days for each outbreak    Herpes simplex infection of other site of male genital organ       vitamin D 2000 UNITS Caps      Take by mouth daily        * Notice:  This list has 2 medication(s) that are the same as other medications prescribed for you. Read the directions carefully, and ask your doctor or other care provider to review them with you.
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
## 2018-04-05 NOTE — NURSING NOTE
"Chief Complaint   Patient presents with     Pre-Op Exam       Initial BP 98/59  Pulse 87  Temp 98.3  F (36.8  C) (Oral)  Ht 5' 10\" (1.778 m)  Wt 181 lb (82.1 kg)  SpO2 98%  BMI 25.97 kg/m2 Estimated body mass index is 25.97 kg/(m^2) as calculated from the following:    Height as of this encounter: 5' 10\" (1.778 m).    Weight as of this encounter: 181 lb (82.1 kg).  Medication Reconciliation: complete   Kell Hodges Certified Medical Assistant    "

## 2018-04-13 ENCOUNTER — THERAPY VISIT (OUTPATIENT)
Dept: PHYSICAL THERAPY | Facility: CLINIC | Age: 68
End: 2018-04-13
Payer: COMMERCIAL

## 2018-04-13 DIAGNOSIS — M25.519 SHOULDER PAIN: Primary | ICD-10-CM

## 2018-04-13 DIAGNOSIS — Z47.89 AFTERCARE FOLLOWING SURGERY OF THE MUSCULOSKELETAL SYSTEM: ICD-10-CM

## 2018-04-13 PROCEDURE — 97161 PT EVAL LOW COMPLEX 20 MIN: CPT | Mod: GP | Performed by: PHYSICAL THERAPIST

## 2018-04-13 PROCEDURE — 97110 THERAPEUTIC EXERCISES: CPT | Mod: GP | Performed by: PHYSICAL THERAPIST

## 2018-04-13 NOTE — PROGRESS NOTES
[facility] Initial Evaluation  Subjective:  Patient is a 67 year old male presenting with rehab left shoulder hpi.           Pt presents to PT s/p left shoulder SAD with biceps tenodesis.  DOS=4/10/18.  Pt injured his left shoulder in June of 2017 and did unsuccessful PT and injections of his shoulder prior to surgery.  He has an MRI confirmed partial tear of his subscapularis tendon, but this was not repaired with his current surgery..    Patient reports pain:  Anterior and lateral.  Radiates to:  Upper arm.  Pain is described as stabbing and aching and is constant and reported as 3/10.  Associated symptoms:  Loss of strength and loss of motion/stiffness. Pain is the same all the time.  Symptoms are exacerbated by using arm overhead, using arm behind back, using arm at shoulder level, lifting and carrying and relieved by rest and bracing/immobilizing.    Special tests:  MRI.  Previous treatment includes physical therapy.  There was mild improvement following previous treatment.  General health as reported by patient is excellent.                  Barriers include:  None as reported by the patient.    Red flags:  None as reported by the patient.                        Objective:  System                   Shoulder Evaluation:  ROM:  AROM:  not assessed                            PROM:    Flexion:  Left:  80          Abduction:  Left:  70          External Rotation:  Left:  37                      Endfeel: Empty, painful  Strength:  not assessed                                                               General     ROS    Assessment/Plan:    Patient is a 67 year old male with left side shoulder complaints.    Patient has the following significant findings with corresponding treatment plan.                Diagnosis 1:  S/p left shoulder SAD with biceps tenodesis  Pain -  hot/cold therapy, education and home program  Decreased ROM/flexibility - manual therapy, therapeutic exercise and home  program  Decreased strength - therapeutic exercise, therapeutic activities and home program    Therapy Evaluation Codes:   1) History comprised of:   Personal factors that impact the plan of care:      None.    Comorbidity factors that impact the plan of care are:      None.     Medications impacting care: None.  2) Examination of Body Systems comprised of:   Body structures and functions that impact the plan of care:      Shoulder.   Activity limitations that impact the plan of care are:      Driving, Dressing, Lifting, Sports and Sleeping.  3) Clinical presentation characteristics are:   Stable/Uncomplicated.  4) Decision-Making    Low complexity using standardized patient assessment instrument and/or measureable assessment of functional outcome.  Cumulative Therapy Evaluation is: Low complexity.    Previous and current functional limitations:  (See Goal Flow Sheet for this information)    Short term and Long term goals: (See Goal Flow Sheet for this information)     Communication ability:  Patient appears to be able to clearly communicate and understand verbal and written communication and follow directions correctly.  Treatment Explanation - The following has been discussed with the patient:   RX ordered/plan of care  Anticipated outcomes  Possible risks and side effects  This patient would benefit from PT intervention to resume normal activities.   Rehab potential is good.    Frequency:  2 X week, once daily  Duration:  for 3 weeks  Discharge Plan:  Achieve all LTG.  Independent in home treatment program.  Reach maximal therapeutic benefit.    Please refer to the daily flowsheet for treatment today, total treatment time and time spent performing 1:1 timed codes.

## 2018-04-13 NOTE — LETTER
[facility]  [address]  [facility] [address]  [phone]    2018    Re: Mynor Curry   :   1950  MRN:  [id number]   REFERRING PHYSICIAN:   Lazaro Cobb    [facility]    Date of Initial Evaluation:  2018  Visits:  Rxs Used: 1  Reason for Referral:     Shoulder pain  Aftercare following surgery of the musculoskeletal system    [facility] Initial Evaluation  Subjective:  Patient is a 67 year old male presenting with rehab left shoulder hpi.   Pt presents to PT s/p left shoulder SAD with biceps tenodesis.  DOS=4/10/18.  Pt injured his left shoulder in 2017 and did unsuccessful PT and injections of his shoulder prior to surgery.  He has an MRI confirmed partial tear of his subscapularis tendon, but this was not repaired with his current surgery..    Patient reports pain:  Anterior and lateral.  Radiates to:  Upper arm.  Pain is described as stabbing and aching and is constant and reported as 3/10.  Associated symptoms:  Loss of strength and loss of motion/stiffness. Pain is the same all the time.  Symptoms are exacerbated by using arm overhead, using arm behind back, using arm at shoulder level, lifting and carrying and relieved by rest and bracing/immobilizing.    Special tests:  MRI.  Previous treatment includes physical therapy.  There was mild improvement following previous treatment.  General health as reported by patient is excellent.                Barriers include:  None as reported by the patient.  Red flags:  None as reported by the patient.    Objective:   Shoulder Evaluation:  ROM:  AROM:  not assessed    PROM:    Flexion:  Left:  80        Abduction:  Left:  70      External Rotation:  Left:  37      Endfeel: Empty, painful  Strength:  not assessed    Assessment/Plan:    Patient is a 67 year old male with left side shoulder complaints.    Patient has the following significant findings  with corresponding treatment plan.                Diagnosis 1:  S/p left shoulder SAD with biceps tenodesis  Pain -  hot/cold therapy, education and home program  Decreased ROM/flexibility - manual therapy, therapeutic exercise and home program  Decreased strength - therapeutic exercise, therapeutic activities and home program  Re: Mynor Curry   :   1950    Therapy Evaluation Codes:   1) History comprised of:   Personal factors that impact the plan of care:      None.    Comorbidity factors that impact the plan of care are:      None.     Medications impacting care: None.  2) Examination of Body Systems comprised of:   Body structures and functions that impact the plan of care:      Shoulder.   Activity limitations that impact the plan of care are:      Driving, Dressing, Lifting, Sports and Sleeping.  3) Clinical presentation characteristics are:   Stable/Uncomplicated.  4) Decision-Making    Low complexity using standardized patient assessment instrument and/or measureable assessment of functional outcome.  Cumulative Therapy Evaluation is: Low complexity.    Previous and current functional limitations:  (See Goal Flow Sheet for this information)    Short term and Long term goals: (See Goal Flow Sheet for this information)     Communication ability:  Patient appears to be able to clearly communicate and understand verbal and written communication and follow directions correctly.  Treatment Explanation - The following has been discussed with the patient:   RX ordered/plan of care  Anticipated outcomes  Possible risks and side effects  This patient would benefit from PT intervention to resume normal activities.   Rehab potential is good.    Frequency:  2 X week, once daily  Duration:  for 3 weeks  Discharge Plan:  Achieve all LTG.  Independent in home treatment program.  Reach maximal therapeutic benefit.    Thank you for your referral.    INQUIRIES  Therapist: Diego Hobson, PT  [facility]  [facility]  [id number] [place] [address]  Phone: [phone]  Fax: [phone]

## 2018-04-18 ENCOUNTER — THERAPY VISIT (OUTPATIENT)
Dept: PHYSICAL THERAPY | Facility: CLINIC | Age: 68
End: 2018-04-18
Payer: COMMERCIAL

## 2018-04-18 DIAGNOSIS — Z47.89 AFTERCARE FOLLOWING SURGERY OF THE MUSCULOSKELETAL SYSTEM: ICD-10-CM

## 2018-04-18 DIAGNOSIS — M25.519 SHOULDER PAIN: ICD-10-CM

## 2018-04-18 PROCEDURE — 97110 THERAPEUTIC EXERCISES: CPT | Mod: GP | Performed by: PHYSICAL THERAPIST

## 2018-04-19 ENCOUNTER — OFFICE VISIT (OUTPATIENT)
Dept: CARDIOLOGY | Facility: CLINIC | Age: 68
End: 2018-04-19
Attending: INTERNAL MEDICINE
Payer: COMMERCIAL

## 2018-04-19 VITALS
BODY MASS INDEX: 26.1 KG/M2 | HEART RATE: 76 BPM | HEIGHT: 70 IN | SYSTOLIC BLOOD PRESSURE: 126 MMHG | DIASTOLIC BLOOD PRESSURE: 78 MMHG | WEIGHT: 182.3 LBS

## 2018-04-19 DIAGNOSIS — Z13.6 CARDIOVASCULAR SCREENING; LDL GOAL LESS THAN 130: ICD-10-CM

## 2018-04-19 DIAGNOSIS — R94.39 ABNORMAL CARDIOVASCULAR STRESS TEST: ICD-10-CM

## 2018-04-19 DIAGNOSIS — I43 CARDIOMYOPATHY IN DISEASES CLASSIFIED ELSEWHERE (H): ICD-10-CM

## 2018-04-19 DIAGNOSIS — I67.9 CEREBROVASCULAR DISEASE: ICD-10-CM

## 2018-04-19 DIAGNOSIS — R55 VASOVAGAL SYNCOPE: ICD-10-CM

## 2018-04-19 DIAGNOSIS — I10 HYPERTENSION GOAL BP (BLOOD PRESSURE) < 140/90: ICD-10-CM

## 2018-04-19 DIAGNOSIS — I25.810 CORONARY ARTERY DISEASE INVOLVING CORONARY BYPASS GRAFT OF NATIVE HEART WITHOUT ANGINA PECTORIS: ICD-10-CM

## 2018-04-19 PROCEDURE — 99213 OFFICE O/P EST LOW 20 MIN: CPT | Performed by: INTERNAL MEDICINE

## 2018-04-19 NOTE — PROGRESS NOTES
HPI and Plan:   See dictation    Orders Placed This Encounter   Procedures     Lipid Profile     ALT     Follow-Up with Cardiologist       No orders of the defined types were placed in this encounter.      There are no discontinued medications.      Encounter Diagnoses   Name Primary?     Hypertension goal BP (blood pressure) < 140/90      Abnormal cardiovascular stress test      Coronary artery disease involving coronary bypass graft of native heart without angina pectoris      Cerebrovascular disease      Cardiomyopathy in diseases classified elsewhere (H)      Vasovagal syncope      CARDIOVASCULAR SCREENING; LDL GOAL LESS THAN 130        CURRENT MEDICATIONS:  Current Outpatient Prescriptions   Medication Sig Dispense Refill     aspirin EC 81 MG tablet Take 1 tablet (81 mg) by mouth daily       Cholecalciferol (VITAMIN D) 2000 UNITS CAPS Take by mouth daily       famotidine (PEPCID) 20 MG tablet Take 20 mg by mouth 2 times daily       Ibuprofen (ADVIL PO) Take 400 mg by mouth every 4 hours as needed        losartan-hydrochlorothiazide (HYZAAR) 100-12.5 MG per tablet Take 1 tablet by mouth daily 90 tablet 1     Multiple Vitamins-Minerals (DAILY MULTIVITAMIN PO) Take by mouth daily       multivitamin  with lutein (OCUVITE WITH LTEIN) CAPS per capsule Take 1 capsule by mouth daily       rosuvastatin (CRESTOR) 20 MG tablet Take 1 tablet (20 mg) by mouth daily 90 tablet 3     sildenafil (VIAGRA) 100 MG tablet Take 0.5-1 tablets ( mg) by mouth daily as needed 6 tablet 11     valACYclovir (VALTREX) 500 MG tablet One tablet by mouth twice daily for three days for each outbreak 30 tablet 3       ALLERGIES     Allergies   Allergen Reactions     Augmentin      vomiting       PAST MEDICAL HISTORY:  Past Medical History:   Diagnosis Date     Arthritis      Coronary artery disease     Mild     HLD (hyperlipidemia)      Hypertension      Syncope     vasovagal       PAST SURGICAL HISTORY:  Past Surgical History:   Procedure  Laterality Date     ARTHROPLASTY HIP ANTERIOR  4/30/2014    Procedure: ARTHROPLASTY HIP ANTERIOR;  Surgeon: Ayaz Butcher MD;  Location: [facility]     ARTHROSCOPY KNEE WITH MEDIAL MENISCECTOMY  2001    left     ARTHROSCOPY SHOULDER  1999    right     COLONOSCOPY       HEMILAMINECTOMY, DISCECTOMY LUMBAR ONE LEVEL, COMBINED  1989     HERNIA REPAIR, INGUINAL RT/LT  2011    bilateral     ORTHOPEDIC SURGERY         FAMILY HISTORY:  Family History   Problem Relation Age of Onset     Arthritis Mother      Hypertension Mother      Arthritis Father      Hypertension Father      Arrhythmia Father      atrial fib     C.A.D. No family hx of      DIABETES No family hx of      Prostate Cancer No family hx of      Cancer - colorectal No family hx of      mother with some polyps though       SOCIAL HISTORY:  Social History     Social History     Marital status:      Spouse name: Melanie     Number of children: 3     Years of education: N/A     Occupational History      Retired     Social History Main Topics     Smoking status: Never Smoker     Smokeless tobacco: Never Used     Alcohol use 0.0 oz/week     0 Standard drinks or equivalent per week      Comment: 2-3 drinks per week     Drug use: No     Sexual activity: Yes     Partners: Female     Birth control/ protection: Condom     Other Topics Concern     Parent/Sibling W/ Cabg, Mi Or Angioplasty Before 65f 55m? No      Service No     Blood Transfusions No     Caffeine Concern No     Hot tea daily - 2 cups     Occupational Exposure No     Hobby Hazards No     Sleep Concern No     Stress Concern No     Weight Concern No     Special Diet No     Back Care No     Exercise Yes     biking- 2-3 days per week  on average     Bike Helmet Yes     Seat Belt Yes     Self-Exams Yes     Social History Narrative       Review of Systems:  Skin:  Negative       Eyes:  Positive for glasses    ENT:  Negative      Respiratory:  Negative       Cardiovascular:   "Negative      Gastroenterology: Negative      Genitourinary:  Positive for erectile dysfunction;prostate problem;urinary frequency;decreased urinary stream;nocturia BPH  Musculoskeletal:  Positive for back pain;joint pain;arthritis bone on bone - both knees, thumbs - stiffness and pain, arthritis in both big toes; Degenerative scoliosis  Neurologic:  Negative      Psychiatric:  Positive for sleep disturbances    Heme/Lymph/Imm:  Positive for allergies RX allergy  Endocrine:  Negative        Physical Exam:  Vitals: /78 (BP Location: Right arm, Patient Position: Chair, Cuff Size: Adult Regular)  Pulse 76  Ht 1.778 m (5' 10\")  Wt 82.7 kg (182 lb 4.8 oz)  BMI 26.16 kg/m2    Constitutional:  cooperative, alert and oriented, well developed, well nourished, in no acute distress thin      Skin:  warm and dry to the touch, no apparent skin lesions or masses noted          Head:  normocephalic, no masses or lesions        Eyes:  pupils equal and round, conjunctivae and lids unremarkable, sclera white, no xanthalasma, EOMS intact, no nystagmus        Lymph:No Cervical lymphadenopathy present     ENT:  no pallor or cyanosis, dentition good;no pallor or cyanosis        Neck:  carotid pulses are full and equal bilaterally, JVP normal, no carotid bruit        Respiratory:  normal breath sounds, clear to auscultation, normal A-P diameter, normal symmetry, normal respiratory excursion, no use of accessory muscles         Cardiac: regular rhythm, normal S1/S2, no S3 or S4, apical impulse not displaced, no murmurs, gallops or rubs                pulses full and equal, no bruits auscultated                                        GI:           Extremities and Muscular Skeletal:  no deformities, clubbing, cyanosis, erythema observed;no edema              Neurological:  no gross motor deficits;affect appropriate        Psych:  Alert and Oriented x 3        CC  Alfonso Ng MD  [address]  [place] " [address]

## 2018-04-19 NOTE — PROGRESS NOTES
Service Date: 04/19/2018      REFERRING PHYSICIAN:  Dr. Doug Seo.      HISTORY OF PRESENT ILLNESS:  It is my pleasure to see your patient, Mynor Curry, in followup.  As you remember, this is a patient with a history of mild coronary artery disease based upon a CT coronary calcium scan.  He also has a history of hypertension, vasovagal syncope and hyperlipidemia.  Since I last saw him, he has been doing well.  He has had no further episodes of syncope.  His blood pressure is well controlled today at 126/78.  His lipids were excellent on 01/16 with an LDL of 47, HDL of 67 and triglycerides of 72 with a total cholesterol of 128.  He is taking rosuvastatin 20 mg per day without complication.  His ALT is normal at 43.  I also note that electrolytes which were drawn on the 5th of April were excellent with a potassium of 3.9, sodium 136, BUN of 15, and creatinine of 0.77.  The patient is having no chest pains or chest pressure.  He has noticed some gastric irritation associated with ibuprofen.  When he stopped the ibuprofen prior to his left shoulder surgery, those symptoms went away.  He has started ibuprofen again for postop pain relief.  I have advised him about taking ibuprofen on a full stomach and also to discuss the use of ibuprofen with you should he have any further problems.      IMPRESSION:   1.  Coronary artery disease.  The patient is asymptomatic with respect to coronary artery disease with no symptoms suggestive of angina pectoris.   2.  Excellent lipid profile as described above.   3.  Normotensive.   4.  Vasovagal syncope.  He has had no further episodes of vasovagal syncope.   5.  Status post left shoulder surgery and is recovering from this.   6.  Possible gastritis due to ibuprofen.      PLAN:  We will continue the patient on his good present medications.  I will see him back again in one year's time and as always he has been told to contact me if he has any questions or any concerns.  It has  been my pleasure to be involved in the care of this extremely nice patient.      cc:   Doug Seo Jr., MD   [facility]    [address] [place]  [address]         HERMINIA GALLAGHER MD, [facility]             D: 2018   T: 2018   MT: ADAN      Name:     BRINA PALACIOS   MRN:      [id number]        Account:      [id number]   :      1950           Service Date: 2018      Document: [id number]

## 2018-04-19 NOTE — LETTER
4/19/2018      Doug Seo Jr, MD  [address] [place] [address]      RE: Mynor Curry       Dear Colleague,    I had the pleasure of seeing Mynor Curry in the [facility].    Service Date: 04/19/2018      REFERRING PHYSICIAN:  Dr. Doug Seo.      HISTORY OF PRESENT ILLNESS:  It is my pleasure to see your patient, Mynor Curry, in followup.  As you remember, this is a patient with a history of mild coronary artery disease based upon a CT coronary calcium scan.  He also has a history of hypertension, vasovagal syncope and hyperlipidemia.  Since I last saw him, he has been doing well.  He has had no further episodes of syncope.  His blood pressure is well controlled today at 126/78.  His lipids were excellent on 01/16 with an LDL of 47, HDL of 67 and triglycerides of 72 with a total cholesterol of 128.  He is taking rosuvastatin 20 mg per day without complication.  His ALT is normal at 43.  I also note that electrolytes which were drawn on the 5th of April were excellent with a potassium of 3.9, sodium 136, BUN of 15, and creatinine of 0.77.  The patient is having no chest pains or chest pressure.  He has noticed some gastric irritation associated with ibuprofen.  When he stopped the ibuprofen prior to his left shoulder surgery, those symptoms went away.  He has started ibuprofen again for postop pain relief.  I have advised him about taking ibuprofen on a full stomach and also to discuss the use of ibuprofen with you should he have any further problems.      IMPRESSION:   1.  Coronary artery disease.  The patient is asymptomatic with respect to coronary artery disease with no symptoms suggestive of angina pectoris.   2.  Excellent lipid profile as described above.   3.  Normotensive.   4.  Vasovagal syncope.  He has had no further episodes of vasovagal syncope.   5.  Status post left shoulder surgery and is recovering from this.   6.  Possible gastritis due to  ibuprofen.      PLAN:  We will continue the patient on his good present medications.  I will see him back again in one year's time and as always he has been told to contact me if he has any questions or any concerns.  It has been my pleasure to be involved in the care of this extremely nice patient.      cc:   Doug Seo Jr., MD   [facility]    [address] [place]  [address]         ALFONSO GALLAGHER MD, [facility]             D: 2018   T: 2018   MT: ADAN      Name:     BRINA PALACIOS   MRN:      [id number]        Account:      [id number]   :      1950           Service Date: 2018      Document: [id number]         Outpatient Encounter Prescriptions as of 2018   Medication Sig Dispense Refill     aspirin EC 81 MG tablet Take 1 tablet (81 mg) by mouth daily       Cholecalciferol (VITAMIN D) 2000 UNITS CAPS Take by mouth daily       famotidine (PEPCID) 20 MG tablet Take 20 mg by mouth 2 times daily       Ibuprofen (ADVIL PO) Take 400 mg by mouth every 4 hours as needed        losartan-hydrochlorothiazide (HYZAAR) 100-12.5 MG per tablet Take 1 tablet by mouth daily 90 tablet 1     Multiple Vitamins-Minerals (DAILY MULTIVITAMIN PO) Take by mouth daily       multivitamin  with lutein (OCUVITE WITH LTEIN) CAPS per capsule Take 1 capsule by mouth daily       rosuvastatin (CRESTOR) 20 MG tablet Take 1 tablet (20 mg) by mouth daily 90 tablet 3     sildenafil (VIAGRA) 100 MG tablet Take 0.5-1 tablets ( mg) by mouth daily as needed 6 tablet 11     valACYclovir (VALTREX) 500 MG tablet One tablet by mouth twice daily for three days for each outbreak 30 tablet 3     No facility-administered encounter medications on file as of 2018.        Again, thank you for allowing me to participate in the care of your patient.      Sincerely,    Alfonso Ng MD, MD     [facility]

## 2018-04-19 NOTE — MR AVS SNAPSHOT
After Visit Summary   4/19/2018    Mynor Curry    MRN: [id number]           Patient Information     Date Of Birth          1950        Visit Information        Provider Department      4/19/2018 9:45 AM Alfonso Ng MD [facility]        Today's Diagnoses     Hypertension goal BP (blood pressure) < 140/90        Abnormal cardiovascular stress test        Coronary artery disease involving coronary bypass graft of native heart without angina pectoris        Cerebrovascular disease        Cardiomyopathy in diseases classified elsewhere (H)        Vasovagal syncope        CARDIOVASCULAR SCREENING; LDL GOAL LESS THAN 130           Follow-ups after your visit        Additional Services     Follow-Up with Cardiologist                 Your next 10 appointments already scheduled     Apr 20, 2018 10:50 AM CDT   [place] Extremity with Diego Hobson PT   [facility]    [id number] [place] [address]   [phone]            Apr 25, 2018 10:10 AM CDT   [place] Extremity with Diego Hobson PT   [facility]    [address] [facility] [address]   [phone]            Apr 27, 2018 10:10 AM CDT   [place] Extremity with Diego Hobson PT   [facility])    [address] [facility] [address]   [phone]            May 02, 2018 10:10 AM CDT   [place] Extremity with Diego Hobson PT   [facility])    [address] [facility] [address]   [phone]            May 04, 2018 10:10 AM CDT   [place] Extremity with Klarissa Lu PTA   [facility]    [address] [facility] [address]   [phone]            May 09, 2018 10:10 AM CDT   [place] Extremity with Diego Hobson PT   [facility] ([facility]  "[place]) [address]  [facility] [address] [phone]              Future tests that were ordered for you today     Open Future Orders        Priority Expected Expires Ordered    Lipid Profile Routine 4/19/2019 4/19/2019 4/19/2018    ALT Routine 4/19/2019 4/19/2019 4/19/2018    Follow-Up with Cardiologist Routine 4/19/2019 4/20/2019 4/19/2018            Who to contact     If you have questions or need follow up information about today's clinic visit or your schedule please contact [facility] directly at [phone].  Normal or non-critical lab and imaging results will be communicated to you by MyChart, letter or phone within 4 business days after the clinic has received the results. If you do not hear from us within 7 days, please contact the clinic through [employer] or phone. If you have a critical or abnormal lab result, we will notify you by phone as soon as possible.  Submit refill requests through [employer] or call your pharmacy and they will forward the refill request to us. Please allow 3 business days for your refill to be completed.          Additional Information About Your Visit        [employer] Information     [employer] gives you secure access to your electronic health record. If you see a primary care provider, you can also send messages to your care team and make appointments. If you have questions, please call your primary care clinic.  If you do not have a primary care provider, please call [phone] and they will assist you.        Care EveryWhere ID     This is your Care EveryWhere ID. This could be used by other organizations to access your [facility] medical records  [id number]        Your Vitals Were     Pulse Height BMI (Body Mass Index)             76 1.778 m (5' 10\") 26.16 kg/m2          Blood Pressure from Last 3 Encounters:   04/19/18 126/78   04/05/18 98/59   01/16/18 118/72    Weight from Last 3 Encounters:   04/19/18 82.7 kg (182 lb " 4.8 oz)   04/05/18 82.1 kg (181 lb)   01/16/18 81.2 kg (179 lb)              We Performed the Following     Follow-Up with Cardiologist        Primary Care Provider Office Phone # Fax #    Doug Seo Jr., [phone] [address]  [place] [address]        Equal Access to Services     [facility]: Hadii aad ku hadasho Soomaali, waaxda luqadaha, qaybta kaalmada adeegyada, waxay idiin hayaan adeeg kharash la'aan ah. So [facility] [phone].    ATENCIÓN: Si habla español, tiene a salas disposición servicios gratuitos de asistencia lingüística. Llame al [phone].    We comply with applicable federal civil rights laws and [place] laws. We do not discriminate on the basis of race, color, national origin, age, disability, sex, sexual orientation, or gender identity.            Thank you!     Thank you for choosing [facility]  for your care. Our goal is always to provide you with excellent care. Hearing back from our patients is one way we can continue to improve our services. Please take a few minutes to complete the written survey that you may receive in the mail after your visit with us. Thank you!             Your Updated Medication List - Protect others around you: Learn how to safely use, store and throw away your medicines at www.disposemymeds.org.          This list is accurate as of 4/19/18 10:07 AM.  Always use your most recent med list.                   Brand Name Dispense Instructions for use Diagnosis    ADVIL PO      Take 400 mg by mouth every 4 hours as needed        aspirin EC 81 MG EC tablet      Take 1 tablet (81 mg) by mouth daily    Occlusion and stenosis of carotid artery without mention of cerebral infarction, Abnormal cardiovascular stress test       * DAILY MULTIVITAMIN PO      Take by mouth daily        * multivitamin  with lutein Caps per capsule      Take 1 capsule by mouth daily        famotidine 20 MG tablet    PEPCID      Take 20 mg by mouth 2 times daily        losartan-hydrochlorothiazide 100-12.5 MG per tablet    HYZAAR    90 tablet    Take 1 tablet by mouth daily    Essential hypertension with goal blood pressure less than 140/90       rosuvastatin 20 MG tablet    CRESTOR    90 tablet    Take 1 tablet (20 mg) by mouth daily    Hyperlipidemia LDL goal <130       sildenafil 100 MG tablet    VIAGRA    6 tablet    Take 0.5-1 tablets ( mg) by mouth daily as needed    Erectile dysfunction due to arterial insufficiency       valACYclovir 500 MG tablet    VALTREX    30 tablet    One tablet by mouth twice daily for three days for each outbreak    Herpes simplex infection of other site of male genital organ       vitamin D 2000 units Caps      Take by mouth daily        * Notice:  This list has 2 medication(s) that are the same as other medications prescribed for you. Read the directions carefully, and ask your doctor or other care provider to review them with you.

## 2018-04-19 NOTE — LETTER
4/19/2018    Doug Seo Jr, MD  [address] [place] [address]    RE: Mynor Curry       Dear Colleague,    I had the pleasure of seeing Mynor uCrry in the [facility].    HPI and Plan:   See dictation    Orders Placed This Encounter   Procedures     Lipid Profile     ALT     Follow-Up with Cardiologist       No orders of the defined types were placed in this encounter.      There are no discontinued medications.      Encounter Diagnoses   Name Primary?     Hypertension goal BP (blood pressure) < 140/90      Abnormal cardiovascular stress test      Coronary artery disease involving coronary bypass graft of native heart without angina pectoris      Cerebrovascular disease      Cardiomyopathy in diseases classified elsewhere (H)      Vasovagal syncope      CARDIOVASCULAR SCREENING; LDL GOAL LESS THAN 130        CURRENT MEDICATIONS:  Current Outpatient Prescriptions   Medication Sig Dispense Refill     aspirin EC 81 MG tablet Take 1 tablet (81 mg) by mouth daily       Cholecalciferol (VITAMIN D) 2000 UNITS CAPS Take by mouth daily       famotidine (PEPCID) 20 MG tablet Take 20 mg by mouth 2 times daily       Ibuprofen (ADVIL PO) Take 400 mg by mouth every 4 hours as needed        losartan-hydrochlorothiazide (HYZAAR) 100-12.5 MG per tablet Take 1 tablet by mouth daily 90 tablet 1     Multiple Vitamins-Minerals (DAILY MULTIVITAMIN PO) Take by mouth daily       multivitamin  with lutein (OCUVITE WITH LTEIN) CAPS per capsule Take 1 capsule by mouth daily       rosuvastatin (CRESTOR) 20 MG tablet Take 1 tablet (20 mg) by mouth daily 90 tablet 3     sildenafil (VIAGRA) 100 MG tablet Take 0.5-1 tablets ( mg) by mouth daily as needed 6 tablet 11     valACYclovir (VALTREX) 500 MG tablet One tablet by mouth twice daily for three days for each outbreak 30 tablet 3       ALLERGIES     Allergies   Allergen Reactions     Augmentin      vomiting       PAST MEDICAL HISTORY:  Past  Medical History:   Diagnosis Date     Arthritis      Coronary artery disease     Mild     HLD (hyperlipidemia)      Hypertension      Syncope     vasovagal       PAST SURGICAL HISTORY:  Past Surgical History:   Procedure Laterality Date     ARTHROPLASTY HIP ANTERIOR  4/30/2014    Procedure: ARTHROPLASTY HIP ANTERIOR;  Surgeon: Ayaz Butcher MD;  Location: [facility]     ARTHROSCOPY KNEE WITH MEDIAL MENISCECTOMY  2001    left     ARTHROSCOPY SHOULDER  1999    right     COLONOSCOPY       HEMILAMINECTOMY, DISCECTOMY LUMBAR ONE LEVEL, COMBINED  1989     HERNIA REPAIR, INGUINAL RT/LT  2011    bilateral     ORTHOPEDIC SURGERY         FAMILY HISTORY:  Family History   Problem Relation Age of Onset     Arthritis Mother      Hypertension Mother      Arthritis Father      Hypertension Father      Arrhythmia Father      atrial fib     C.A.D. No family hx of      DIABETES No family hx of      Prostate Cancer No family hx of      Cancer - colorectal No family hx of      mother with some polyps though       SOCIAL HISTORY:  Social History     Social History     Marital status:      Spouse name: Melanie     Number of children: 3     Years of education: N/A     Occupational History      Retired     Social History Main Topics     Smoking status: Never Smoker     Smokeless tobacco: Never Used     Alcohol use 0.0 oz/week     0 Standard drinks or equivalent per week      Comment: 2-3 drinks per week     Drug use: No     Sexual activity: Yes     Partners: Female     Birth control/ protection: Condom     Other Topics Concern     Parent/Sibling W/ Cabg, Mi Or Angioplasty Before 65f 55m? No      Service No     Blood Transfusions No     Caffeine Concern No     Hot tea daily - 2 cups     Occupational Exposure No     Hobby Hazards No     Sleep Concern No     Stress Concern No     Weight Concern No     Special Diet No     Back Care No     Exercise Yes     biking- 2-3 days per week  on average     Bike Helmet  "Yes     Seat Belt Yes     Self-Exams Yes     Social History Narrative       Review of Systems:  Skin:  Negative       Eyes:  Positive for glasses    ENT:  Negative      Respiratory:  Negative       Cardiovascular:  Negative      Gastroenterology: Negative      Genitourinary:  Positive for erectile dysfunction;prostate problem;urinary frequency;decreased urinary stream;nocturia BPH  Musculoskeletal:  Positive for back pain;joint pain;arthritis bone on bone - both knees, thumbs - stiffness and pain, arthritis in both big toes; Degenerative scoliosis  Neurologic:  Negative      Psychiatric:  Positive for sleep disturbances    Heme/Lymph/Imm:  Positive for allergies RX allergy  Endocrine:  Negative        Physical Exam:  Vitals: /78 (BP Location: Right arm, Patient Position: Chair, Cuff Size: Adult Regular)  Pulse 76  Ht 1.778 m (5' 10\")  Wt 82.7 kg (182 lb 4.8 oz)  BMI 26.16 kg/m2    Constitutional:  cooperative, alert and oriented, well developed, well nourished, in no acute distress thin      Skin:  warm and dry to the touch, no apparent skin lesions or masses noted          Head:  normocephalic, no masses or lesions        Eyes:  pupils equal and round, conjunctivae and lids unremarkable, sclera white, no xanthalasma, EOMS intact, no nystagmus        Lymph:No Cervical lymphadenopathy present     ENT:  no pallor or cyanosis, dentition good;no pallor or cyanosis        Neck:  carotid pulses are full and equal bilaterally, JVP normal, no carotid bruit        Respiratory:  normal breath sounds, clear to auscultation, normal A-P diameter, normal symmetry, normal respiratory excursion, no use of accessory muscles         Cardiac: regular rhythm, normal S1/S2, no S3 or S4, apical impulse not displaced, no murmurs, gallops or rubs                pulses full and equal, no bruits auscultated                                        GI:           Extremities and Muscular Skeletal:  no deformities, clubbing, cyanosis, " erythema observed;no edema              Neurological:  no gross motor deficits;affect appropriate        Psych:  Alert and Oriented x 3        CC  Alfonso Ng MD  [address]  [place] [address]                Thank you for allowing me to participate in the care of your patient.      Sincerely,     Alfonso Ng MD, MD     [facility]    cc:   Alfonso Ng MD  [address]  [place] [id number]

## 2018-04-20 ENCOUNTER — THERAPY VISIT (OUTPATIENT)
Dept: PHYSICAL THERAPY | Facility: CLINIC | Age: 68
End: 2018-04-20
Payer: COMMERCIAL

## 2018-04-20 DIAGNOSIS — Z47.89 AFTERCARE FOLLOWING SURGERY OF THE MUSCULOSKELETAL SYSTEM: ICD-10-CM

## 2018-04-20 DIAGNOSIS — M25.519 SHOULDER PAIN: ICD-10-CM

## 2018-04-20 PROCEDURE — 97110 THERAPEUTIC EXERCISES: CPT | Mod: GP | Performed by: PHYSICAL THERAPIST

## 2018-04-25 ENCOUNTER — THERAPY VISIT (OUTPATIENT)
Dept: PHYSICAL THERAPY | Facility: CLINIC | Age: 68
End: 2018-04-25
Payer: COMMERCIAL

## 2018-04-25 DIAGNOSIS — M25.519 SHOULDER PAIN: ICD-10-CM

## 2018-04-25 DIAGNOSIS — Z47.89 AFTERCARE FOLLOWING SURGERY OF THE MUSCULOSKELETAL SYSTEM: ICD-10-CM

## 2018-04-25 PROCEDURE — 97110 THERAPEUTIC EXERCISES: CPT | Mod: GP | Performed by: PHYSICAL THERAPIST

## 2018-04-27 ENCOUNTER — THERAPY VISIT (OUTPATIENT)
Dept: PHYSICAL THERAPY | Facility: CLINIC | Age: 68
End: 2018-04-27
Payer: COMMERCIAL

## 2018-04-27 DIAGNOSIS — M25.519 SHOULDER PAIN: ICD-10-CM

## 2018-04-27 DIAGNOSIS — Z47.89 AFTERCARE FOLLOWING SURGERY OF THE MUSCULOSKELETAL SYSTEM: ICD-10-CM

## 2018-04-27 PROCEDURE — 97110 THERAPEUTIC EXERCISES: CPT | Mod: GP | Performed by: PHYSICAL THERAPIST

## 2018-05-02 ENCOUNTER — THERAPY VISIT (OUTPATIENT)
Dept: PHYSICAL THERAPY | Facility: CLINIC | Age: 68
End: 2018-05-02
Payer: COMMERCIAL

## 2018-05-02 DIAGNOSIS — M25.519 SHOULDER PAIN: ICD-10-CM

## 2018-05-02 DIAGNOSIS — Z47.89 AFTERCARE FOLLOWING SURGERY OF THE MUSCULOSKELETAL SYSTEM: ICD-10-CM

## 2018-05-02 PROCEDURE — 97110 THERAPEUTIC EXERCISES: CPT | Mod: GP | Performed by: PHYSICAL THERAPIST

## 2018-05-09 ENCOUNTER — THERAPY VISIT (OUTPATIENT)
Dept: PHYSICAL THERAPY | Facility: CLINIC | Age: 68
End: 2018-05-09
Payer: COMMERCIAL

## 2018-05-09 DIAGNOSIS — M25.519 SHOULDER PAIN: ICD-10-CM

## 2018-05-09 DIAGNOSIS — Z47.89 AFTERCARE FOLLOWING SURGERY OF THE MUSCULOSKELETAL SYSTEM: ICD-10-CM

## 2018-05-09 PROCEDURE — 97110 THERAPEUTIC EXERCISES: CPT | Mod: GP | Performed by: PHYSICAL THERAPIST

## 2018-05-16 ENCOUNTER — THERAPY VISIT (OUTPATIENT)
Dept: PHYSICAL THERAPY | Facility: CLINIC | Age: 68
End: 2018-05-16
Payer: COMMERCIAL

## 2018-05-16 DIAGNOSIS — Z47.89 AFTERCARE FOLLOWING SURGERY OF THE MUSCULOSKELETAL SYSTEM: ICD-10-CM

## 2018-05-16 DIAGNOSIS — M25.519 SHOULDER PAIN: ICD-10-CM

## 2018-05-16 PROCEDURE — 97110 THERAPEUTIC EXERCISES: CPT | Mod: GP

## 2018-05-16 NOTE — LETTER
[facility]  [address] [facility] [address] [phone]    May 22, 2018    Re: Mynor Curry   :   1950  MRN:  [id number]   REFERRING PHYSICIAN:   Lazaro Cobb    [facility]  Date of Initial Evaluation:  2018  Visits:  Rxs Used: 2  Reason for Referral:     Shoulder pain  Aftercare following surgery of the musculoskeletal system    PROGRESS  REPORT  Progress reporting period is from 2018 to 2018.       SUBJECTIVE  Subjective changes noted by patient:   States he is noting improved motion and less pain throughout the day.      Current pain level is: 0/10.     Previous pain level was: 8/10.   Changes in function:  Yes (See Goal flowsheet attached for changes in current functional level)  Adverse reaction to treatment or activity: None    OBJECTIVE  Changes noted in objective findings:  Yes,   Objective: AROM flex- 130, abd- 75, PROM flex- 150, abd 115, ER-75     ASSESSMENT/PLAN  Updated problem list and treatment plan: Diagnosis 1:  L SAD with bicep tenodesis  Pain -  hot/cold therapy  Decreased ROM/flexibility - manual therapy and therapeutic exercise  Decreased strength - therapeutic exercise and therapeutic activities  STG/LTGs have been met or progress has been made towards goals:  Yes (See Goal flow sheet completed today.)  Assessment of Progress: The patient's condition is improving.  Self Management Plans:  Patient has been instructed in a home treatment program.  Mynor continues to require the following intervention to meet STG and LTG's:  PT    Recommendations:  This patient would benefit from continued therapy.     Frequency:  1 X week, once daily  Duration:  for 4 weeks          Re: Mynor Curry   :   1950                    Thank you for your referral.    INQUIRIES  Therapist: Klarissa Lu [facility]  [address]  [facility] [address]  Phone: [phone]  Fax:  [phone]

## 2018-05-16 NOTE — PROGRESS NOTES
Subjective:  HPI                    Objective:  System    Physical Exam    General     ROS    Assessment/Plan:    PROGRESS  REPORT    Progress reporting period is from 4/13/2018 to 5/16/2018.       SUBJECTIVE  Subjective changes noted by patient:   States he is noting improved motion and less pain throughout the day.      Current pain level is: 0/10.     Previous pain level was: 8/10.   Changes in function:  Yes (See Goal flowsheet attached for changes in current functional level)  Adverse reaction to treatment or activity: None    OBJECTIVE  Changes noted in objective findings:  Yes,   Objective: AROM flex- 130, abd- 75, PROM flex- 150, abd 115, ER-75     ASSESSMENT/PLAN  Updated problem list and treatment plan: Diagnosis 1:  L SAD with bicep tenodesis  Pain -  hot/cold therapy  Decreased ROM/flexibility - manual therapy and therapeutic exercise  Decreased strength - therapeutic exercise and therapeutic activities  STG/LTGs have been met or progress has been made towards goals:  Yes (See Goal flow sheet completed today.)  Assessment of Progress: The patient's condition is improving.  Self Management Plans:  Patient has been instructed in a home treatment program.    Mynor continues to require the following intervention to meet STG and LTG's:  PT    Recommendations:  This patient would benefit from continued therapy.     Frequency:  1 X week, once daily  Duration:  for 4 weeks        Please refer to the daily flowsheet for treatment today, total treatment time and time spent performing 1:1 timed codes.

## 2018-05-21 ENCOUNTER — THERAPY VISIT (OUTPATIENT)
Dept: PHYSICAL THERAPY | Facility: CLINIC | Age: 68
End: 2018-05-21
Payer: COMMERCIAL

## 2018-05-21 ENCOUNTER — TRANSFERRED RECORDS (OUTPATIENT)
Dept: HEALTH INFORMATION MANAGEMENT | Facility: CLINIC | Age: 68
End: 2018-05-21

## 2018-05-21 DIAGNOSIS — M25.519 SHOULDER PAIN: ICD-10-CM

## 2018-05-21 DIAGNOSIS — Z47.89 AFTERCARE FOLLOWING SURGERY OF THE MUSCULOSKELETAL SYSTEM: ICD-10-CM

## 2018-05-21 PROCEDURE — 97110 THERAPEUTIC EXERCISES: CPT | Mod: GP | Performed by: PHYSICAL THERAPIST

## 2018-05-30 ENCOUNTER — THERAPY VISIT (OUTPATIENT)
Dept: PHYSICAL THERAPY | Facility: CLINIC | Age: 68
End: 2018-05-30
Payer: COMMERCIAL

## 2018-05-30 DIAGNOSIS — M25.519 SHOULDER PAIN: ICD-10-CM

## 2018-05-30 DIAGNOSIS — Z47.89 AFTERCARE FOLLOWING SURGERY OF THE MUSCULOSKELETAL SYSTEM: ICD-10-CM

## 2018-05-30 PROCEDURE — 97110 THERAPEUTIC EXERCISES: CPT | Mod: GP | Performed by: PHYSICAL THERAPIST

## 2018-06-07 ENCOUNTER — THERAPY VISIT (OUTPATIENT)
Dept: PHYSICAL THERAPY | Facility: CLINIC | Age: 68
End: 2018-06-07
Payer: COMMERCIAL

## 2018-06-07 DIAGNOSIS — M25.519 SHOULDER PAIN: ICD-10-CM

## 2018-06-07 DIAGNOSIS — Z47.89 AFTERCARE FOLLOWING SURGERY OF THE MUSCULOSKELETAL SYSTEM: ICD-10-CM

## 2018-06-07 PROCEDURE — 97110 THERAPEUTIC EXERCISES: CPT | Mod: GP | Performed by: PHYSICAL THERAPIST

## 2018-06-12 ENCOUNTER — OFFICE VISIT (OUTPATIENT)
Dept: FAMILY MEDICINE | Facility: CLINIC | Age: 68
End: 2018-06-12
Payer: COMMERCIAL

## 2018-06-12 VITALS
TEMPERATURE: 98.4 F | BODY MASS INDEX: 26.2 KG/M2 | HEART RATE: 75 BPM | HEIGHT: 70 IN | OXYGEN SATURATION: 98 % | WEIGHT: 183 LBS | SYSTOLIC BLOOD PRESSURE: 160 MMHG | DIASTOLIC BLOOD PRESSURE: 84 MMHG

## 2018-06-12 DIAGNOSIS — M54.50 RIGHT-SIDED LOW BACK PAIN WITHOUT SCIATICA, UNSPECIFIED CHRONICITY: Primary | ICD-10-CM

## 2018-06-12 PROCEDURE — 99213 OFFICE O/P EST LOW 20 MIN: CPT | Performed by: FAMILY MEDICINE

## 2018-06-12 RX ORDER — METHYLPREDNISOLONE 4 MG
TABLET, DOSE PACK ORAL
Qty: 21 TABLET | Refills: 0 | Status: SHIPPED | OUTPATIENT
Start: 2018-06-12 | End: 2018-06-12

## 2018-06-12 RX ORDER — METHYLPREDNISOLONE 4 MG
TABLET, DOSE PACK ORAL
Qty: 21 TABLET | Refills: 0 | Status: SHIPPED | OUTPATIENT
Start: 2018-06-12 | End: 2018-09-26

## 2018-06-12 NOTE — PROGRESS NOTES
SUBJECTIVE:                                                    Mynor Curry is a 67 year old male who presents to clinic today for the following health issues:      Mynor is here to follow-up on low back pain.  He states he is doing with his symptoms along the sciatic pain in February 2018 when he was seen at [facility].  He states the pain did seem to improve but recently started flaring up last week on the right side in his lumbar.  He states the pain is constant and worse when all of his weight is on his right leg, in the morning, and when changing positions.  He states he has been trying to do some light stretching, apply ice, take ibuprofen 800 mg every 6 hours.  He has not found any improvement.  Denies any saddle anesthesia or incontinence of bowel or bladder.  Denies lower extremity weakness or change in sensation.        He was seen at [facility] in February 2018. Had MRI that demonstrated:    Mild convex left curvature of the upper lumbar spine convex to the right curvature of the lower lumbar spine.  Multilevel degenerative disc disease and Schmorl's nodes.  Specific findings according to level include: Stenosis.  Disc bulge contacts the traversing S1 nerve roots without impingement.    1. L5-S1 left-sided laminectomy without residual central moderate bilateral facet arthropathy and moderate foraminal stenosis.  2.  L4-5 5 mm spondylolisthesis with disc bulge/protrusion indenting the dural sac.  Severe facet arthropathy and ligamentum flavum thickening.  Moderate central spinal canal and subarticular recess stenosis with impingement upon the traversing L5 nerve roots.  Severe left foraminal stenosis and left L4 ganglion impingement.  3.  L1-2 right posterior lateral caudally extruded disc herniation with impingement upon the traversing right L2 nerve root.  Facet arthropathy and ligamentum flavum thickening causing moderate dural sac narrowing and bilateral subarticular recess  stenosis.  Moderate bilateral foraminal stenosis.  4.  Mild to moderate central spinal canal stenosis at the L2-3 and L3-4 levels with encroachment upon the traversing L4 nerve roots.        Problem list and histories reviewed & adjusted, as indicated.  Additional history: as documented    Patient Active Problem List   Diagnosis     Low back pain     S/P prosthetic total arthroplasty of the hip     Erectile dysfunction due to arterial insufficiency     Advanced directives, counseling/discussion     Essential hypertension with goal blood pressure less than 140/90     Benign non-nodular prostatic hyperplasia with lower urinary tract symptoms     Coronary artery disease     Hyperlipidemia LDL goal <70     Shoulder pain     Aftercare following surgery of the musculoskeletal system     Past Surgical History:   Procedure Laterality Date     ARTHROPLASTY HIP ANTERIOR  4/30/2014    Procedure: ARTHROPLASTY HIP ANTERIOR;  Surgeon: Ayaz Butcher MD;  Location: [facility]     ARTHROSCOPY KNEE WITH MEDIAL MENISCECTOMY  2001    left     ARTHROSCOPY SHOULDER  1999    right     COLONOSCOPY       HEMILAMINECTOMY, DISCECTOMY LUMBAR ONE LEVEL, COMBINED  1989     HERNIA REPAIR, INGUINAL RT/LT  2011    bilateral     ORTHOPEDIC SURGERY         Social History   Substance Use Topics     Smoking status: Never Smoker     Smokeless tobacco: Never Used     Alcohol use 0.0 oz/week     0 Standard drinks or equivalent per week      Comment: 2-3 drinks per week     Family History   Problem Relation Age of Onset     Arthritis Mother      Hypertension Mother      Arthritis Father      Hypertension Father      Arrhythmia Father      atrial fib     C.A.D. No family hx of      DIABETES No family hx of      Prostate Cancer No family hx of      Cancer - colorectal No family hx of      mother with some polyps though           ROS:  Constitutional, HEENT, cardiovascular, pulmonary, gi and gu systems are negative, except as otherwise  "noted.    OBJECTIVE:     /84 (BP Location: Right arm, Patient Position: Chair, Cuff Size: Adult Large)  Pulse 75  Temp 98.4  F (36.9  C) (Oral)  Ht 5' 10\" (1.778 m)  Wt 183 lb (83 kg)  SpO2 98%  BMI 26.26 kg/m2  Body mass index is 26.26 kg/(m^2).  GENERAL: healthy, alert and no distress  BACK: no CVA tenderness, no paralumbar tenderness, negative SLR    Diagnostic Test Results:  none     ASSESSMENT/PLAN:   1. Right-sided low back pain without sciatica, unspecified chronicity: he does have rather significant findings in his lumbar spine including disc herniations, protrusions, stenosis and spondylolisthesis. Discussed possible epidural steroid injection for treatment of low back pain.  He would prefer to try oral steroids at this time.  Will prescribe Medrol Dosepak.  Continue PT.  If symptoms not improving or if continue to recur, would recommend RACHELE and/or evaluation by spine.  - methylPREDNISolone (MEDROL DOSEPAK) 4 MG tablet; Follow package instructions  Dispense: 21 tablet; Refill: 0      Gurmeet Urbina DO  [facility] [place]      "

## 2018-06-12 NOTE — MR AVS SNAPSHOT
After Visit Summary   6/12/2018    Mynor Curry    MRN: [id number]           Patient Information     Date Of Birth          1950        Visit Information        Provider Department      6/12/2018 2:40 PM Gurmeet Urbina, DO [facility]        Today's Diagnoses     Right-sided low back pain without sciatica, unspecified chronicity    -  1       Follow-ups after your visit        Follow-up notes from your care team     Return in about 1 week (around 6/19/2018), or if symptoms worsen or fail to improve.      Your next 10 appointments already scheduled     Jun 13, 2018  3:20 PM CDT   [place] Ext[place]ity with Klarissa Lu [facility]    [address] [facility] [address]   [phone]            Jun 29, 2018  1:00 PM CDT   [place] Ext[place]ity with Klarissa Lu PTA   [facility]    [address] [address]   [phone]            Jul 06, 2018  9:30 AM CDT   [place] Extremity with Klarissa Lu PTA   [facility]    [address] [address]   [phone]              Who to contact     If you have questions or need follow up information about today's clinic visit or your schedule please contact [facility] directly at [phone].  Normal or non-critical lab and imaging results will be communicated to you by MyChart, letter or phone within 4 business days after the clinic has received the results. If you do not hear from us within 7 days, please contact the clinic through MyChart or phone. If you have a critical or abnormal lab result, we will notify you by phone as soon as possible.  Submit refill requests through [email or web address] or call your pharmacy and they will forward the refill request to us. Please allow 3 business days for your refill to be completed.          Additional Information About Your Visit        [facility]  "Information     [facility] gives you secure access to your electronic health record. If you see a primary care provider, you can also send messages to your care team and make appointments. If you have questions, please call your primary care clinic.  If you do not have a primary care provider, please call [phone] and they will assist you.        Care EveryWhere ID     This is your Care EveryWhere ID. This could be used by other organizations to access your [place] medical records  [id number]        Your Vitals Were     Pulse Temperature Height Pulse Oximetry BMI (Body Mass Index)       75 98.4  F (36.9  C) (Oral) 5' 10\" (1.778 m) 98% 26.26 kg/m2        Blood Pressure from Last 3 Encounters:   06/12/18 160/84   04/19/18 126/78   04/05/18 98/59    Weight from Last 3 Encounters:   06/12/18 183 lb (83 kg)   04/19/18 182 lb 4.8 oz (82.7 kg)   04/05/18 181 lb (82.1 kg)              Today, you had the following     No orders found for display         Today's Medication Changes          These changes are accurate as of 6/12/18  3:12 PM.  If you have any questions, ask your nurse or doctor.               Start taking these medicines.        Dose/Directions    methylPREDNISolone 4 MG tablet   Commonly known as:  MEDROL DOSEPAK   Used for:  Right-sided low back pain without sciatica, unspecified chronicity   Started by:  Gurmeet Urbina, DO        Follow package instructions   Quantity:  21 tablet   Refills:  0            Where to get your medicines      [facility] [id number] [place] [address] [facility] [address]  [address] [facility] [address]     Phone:  [phone]     methylPREDNISolone 4 MG tablet                Primary Care Provider Office Phone # Fax #    Doug Seo Jr., [phone] [address]  [place] [address]        Equal Access to Services     MILENA LEDESMA AH: Hadii souleymane More, " flores davis ingrisvladimir coelho erintai chung. So [facility] [phone].    ATENCIÓN: Si shahzad graham, tiene a salas disposición servicios gratuitos de asistencia lingüística. Joesph al [phone].    We comply with applicable federal civil rights laws and [place] laws. We do not discriminate on the basis of race, color, national origin, age, disability, sex, sexual orientation, or gender identity.            Thank you!     Thank you for choosing [facility] [place]  for your care. Our goal is always to provide you with excellent care. Hearing back from our patients is one way we can continue to improve our services. Please take a few minutes to complete the written survey that you may receive in the mail after your visit with us. Thank you!             Your Updated Medication List - Protect others around you: Learn how to safely use, store and throw away your medicines at www.disposemymeds.org.          This list is accurate as of 6/12/18  3:12 PM.  Always use your most recent med list.                   Brand Name Dispense Instructions for use Diagnosis    ADVIL PO      Take 400 mg by mouth every 4 hours as needed        aspirin 81 MG EC tablet      Take 1 tablet (81 mg) by mouth daily    Occlusion and stenosis of carotid artery without mention of cerebral infarction, Abnormal cardiovascular stress test       * DAILY MULTIVITAMIN PO      Take by mouth daily        * multivitamin  with lutein Caps per capsule      Take 1 capsule by mouth daily        famotidine 20 MG tablet    PEPCID     Take 20 mg by mouth 2 times daily        losartan-hydrochlorothiazide 100-12.5 MG per tablet    HYZAAR    90 tablet    Take 1 tablet by mouth daily    Essential hypertension with goal blood pressure less than 140/90       methylPREDNISolone 4 MG tablet    MEDROL DOSEPAK    21 tablet    Follow package instructions    Right-sided low back pain without sciatica, unspecified chronicity       rosuvastatin 20 MG  tablet    CRESTOR    90 tablet    Take 1 tablet (20 mg) by mouth daily    Hyperlipidemia LDL goal <130       sildenafil 100 MG tablet    VIAGRA    6 tablet    Take 0.5-1 tablets ( mg) by mouth daily as needed    Erectile dysfunction due to arterial insufficiency       valACYclovir 500 MG tablet    VALTREX    30 tablet    One tablet by mouth twice daily for three days for each outbreak    Herpes simplex infection of other site of male genital organ       vitamin D 2000 units Caps      Take by mouth daily        * Notice:  This list has 2 medication(s) that are the same as other medications prescribed for you. Read the directions carefully, and ask your doctor or other care provider to review them with you.

## 2018-06-13 ENCOUNTER — THERAPY VISIT (OUTPATIENT)
Dept: PHYSICAL THERAPY | Facility: CLINIC | Age: 68
End: 2018-06-13
Payer: COMMERCIAL

## 2018-06-13 DIAGNOSIS — M25.519 SHOULDER PAIN: ICD-10-CM

## 2018-06-13 DIAGNOSIS — Z47.89 AFTERCARE FOLLOWING SURGERY OF THE MUSCULOSKELETAL SYSTEM: ICD-10-CM

## 2018-06-13 PROCEDURE — 97110 THERAPEUTIC EXERCISES: CPT | Mod: GP

## 2018-06-29 ENCOUNTER — TRANSFERRED RECORDS (OUTPATIENT)
Dept: HEALTH INFORMATION MANAGEMENT | Facility: CLINIC | Age: 68
End: 2018-06-29

## 2018-06-29 ENCOUNTER — THERAPY VISIT (OUTPATIENT)
Dept: PHYSICAL THERAPY | Facility: CLINIC | Age: 68
End: 2018-06-29
Payer: COMMERCIAL

## 2018-06-29 DIAGNOSIS — M25.519 SHOULDER PAIN: ICD-10-CM

## 2018-06-29 DIAGNOSIS — Z47.89 AFTERCARE FOLLOWING SURGERY OF THE MUSCULOSKELETAL SYSTEM: ICD-10-CM

## 2018-06-29 PROCEDURE — 97110 THERAPEUTIC EXERCISES: CPT | Mod: GP

## 2018-06-29 NOTE — MR AVS SNAPSHOT
After Visit Summary   6/29/2018    Mynor Curry    MRN: [id number]           Patient Information     Date Of Birth          1950        Visit Information        Provider Department      6/29/2018 1:00 PM Klarissa Lu PTA [facility]        Today's Diagnoses     Shoulder pain        Aftercare following surgery of the musculoskeletal system           Follow-ups after your visit        Your next 10 appointments already scheduled     Jul 06, 2018  9:30 AM CDT   [place] Extremity with Klarissa Lu PTA   [facility] ([facility])    [address]  [facility] [address] [phone]              Who to contact     If you have questions or need follow up information about today's clinic visit or your schedule please contact [facility] directly at [phone].  Normal or non-critical lab and imaging results will be communicated to you by OneShifthart, letter or phone within 4 business days after the clinic has received the results. If you do not hear from us within 7 days, please contact the clinic through OneShifthart or phone. If you have a critical or abnormal lab result, we will notify you by phone as soon as possible.  Submit refill requests through Anbado Video or call your pharmacy and they will forward the refill request to us. Please allow 3 business days for your refill to be completed.          Additional Information About Your Visit        MyChart Information     Anbado Video gives you secure access to your electronic health record. If you see a primary care provider, you can also send messages to your care team and make appointments. If you have questions, please call your primary care clinic.  If you do not have a primary care provider, please call [phone] and they will assist you.        Care EveryWhere ID     This is your Care EveryWhere ID. This could be used by other organizations to access your [facility]  records  [id number]         Blood Pressure from Last 3 Encounters:   06/12/18 160/84   04/19/18 126/78   04/05/18 98/59    Weight from Last 3 Encounters:   06/12/18 83 kg (183 lb)   04/19/18 82.7 kg (182 lb 4.8 oz)   04/05/18 82.1 kg (181 lb)              We Performed the Following     Hot or Cold Packs Therapy     Therapeutic Exercises        Primary Care Provider Office Phone # Fax #    Doug Seo Jr., [phone] [address] [facility] [address]        Equal Access to Services     [facility]: Hadii aad ku hadasho Soomaali, waaxda luqadaha, qaybta kaalmada adeegyada, vladimir mixon haymoshen adetamara perez . So [facility] [place] [phone].    ATENCIÓN: Si habla español, tiene a salas disposición servicios gratuitos de asistencia lingüística. Llame al [phone].    We comply with applicable federal civil rights laws and [place] laws. We do not discriminate on the basis of race, color, national origin, age, disability, sex, sexual orientation, or gender identity.            Thank you!     Thank you for choosing [facility]  for your care. Our goal is always to provide you with excellent care. Hearing back from our patients is one way we can continue to improve our services. Please take a few minutes to complete the written survey that you may receive in the mail after your visit with us. Thank you!             Your Updated Medication List - Protect others around you: Learn how to safely use, store and throw away your medicines at www.disposemymeds.org.          This list is accurate as of 6/29/18  4:35 PM.  Always use your most recent med list.                   Brand Name Dispense Instructions for use Diagnosis    ADVIL PO      Take 400 mg by mouth every 4 hours as needed        aspirin 81 MG EC tablet      Take 1 tablet (81 mg) by mouth daily    Occlusion and stenosis of carotid artery without mention of cerebral infarction, Abnormal cardiovascular stress  test       * DAILY MULTIVITAMIN PO      Take by mouth daily        * multivitamin  with lutein Caps per capsule      Take 1 capsule by mouth daily        famotidine 20 MG tablet    PEPCID     Take 20 mg by mouth 2 times daily        losartan-hydrochlorothiazide 100-12.5 MG per tablet    HYZAAR    90 tablet    Take 1 tablet by mouth daily    Essential hypertension with goal blood pressure less than 140/90       methylPREDNISolone 4 MG tablet    MEDROL DOSEPAK    21 tablet    Follow package instructions    Right-sided low back pain without sciatica, unspecified chronicity       rosuvastatin 20 MG tablet    CRESTOR    90 tablet    Take 1 tablet (20 mg) by mouth daily    Hyperlipidemia LDL goal <130       sildenafil 100 MG tablet    VIAGRA    6 tablet    Take 0.5-1 tablets ( mg) by mouth daily as needed    Erectile dysfunction due to arterial insufficiency       valACYclovir 500 MG tablet    VALTREX    30 tablet    One tablet by mouth twice daily for three days for each outbreak    Herpes simplex infection of other site of male genital organ       vitamin D 2000 units Caps      Take by mouth daily        * Notice:  This list has 2 medication(s) that are the same as other medications prescribed for you. Read the directions carefully, and ask your doctor or other care provider to review them with you.

## 2018-07-02 NOTE — PROGRESS NOTES
Subjective:  HPI                    Objective:  System    Physical Exam    General     ROS    Assessment/Plan:    DISCHARGE REPORT    Progress reporting period is from 3/21/2018 to 6/29/2018.       SUBJECTIVE  Subjective changes noted by patient:  States he is continuing to note improved motion and strength in his left shoulder.     Current pain level is 1/10  .     Previous pain level was 8/10.   Changes in function:  Yes (See Goal flowsheet attached for changes in current functional level)  Adverse reaction to treatment or activity: None    OBJECTIVE  Changes noted in objective findings:  Yes,   Objective: PROM flex- 165, abd-160, ER-90, IR-55     ASSESSMENT/PLAN  Updated problem list and treatment plan: Diagnosis 1:  L  SAD with bicep tenodesis Decreased ROM/flexibility - manual therapy and therapeutic exercise  Decreased strength - therapeutic exercise and therapeutic activities  STG/LTGs have been met or progress has been made towards goals:  Yes (See Goal flow sheet completed today.)  Assessment of Progress: The patient's condition is improving.  Self Management Plans:  Patient has been instructed in a home treatment program.    Mynor continues to require the following intervention to meet STG and LTG's:  PT intervention is no longer required to meet STG/LTG.    Recommendations:  This patient is ready to be discharged from therapy and continue their home treatment program.    Please refer to the daily flowsheet for treatment today, total treatment time and time spent performing 1:1 timed codes.

## 2018-07-05 ENCOUNTER — TELEPHONE (OUTPATIENT)
Dept: FAMILY MEDICINE | Facility: CLINIC | Age: 68
End: 2018-07-05

## 2018-07-05 DIAGNOSIS — G89.29 CHRONIC LOW BACK PAIN, UNSPECIFIED BACK PAIN LATERALITY, WITH SCIATICA PRESENCE UNSPECIFIED: Primary | ICD-10-CM

## 2018-07-05 DIAGNOSIS — M54.5 CHRONIC LOW BACK PAIN, UNSPECIFIED BACK PAIN LATERALITY, WITH SCIATICA PRESENCE UNSPECIFIED: Primary | ICD-10-CM

## 2018-07-05 NOTE — TELEPHONE ENCOUNTER
Pt was to call back and give an update after completing a course of steroids.  States it did not do much for his back pain so is asking for something else.  States he feels maybe Gabapentin or Lyrica would help his pain.  Uses [facility] if we can prescribe something for him.  Can be reached at [phone].  OK to leave detailed message.  Barb Michele

## 2018-07-10 NOTE — TELEPHONE ENCOUNTER
Based on recent MRI, I would like to have him see spine for further recommendations. Gabapentin may be an option, however, would possibly consider epidural steroid injection as well. I will placed a referral and he can schedule at his convenience.    Gurmeet Urbina,   7/10/2018 12:37 PM

## 2018-07-11 NOTE — TELEPHONE ENCOUNTER
Called pt at number below and spoke with pt.  His phone keeps cutting out but I was able to understand that he has spoken with the spine group and has asked to hold off on that appt for now.  I was trying to see if he wants me to see if we can do the gabapentin rx for the meantime, but I could not hear him anymore.  Will wait to hear back from him when he has better signal.  Barb Michele

## 2018-07-12 ENCOUNTER — THERAPY VISIT (OUTPATIENT)
Dept: PHYSICAL THERAPY | Facility: CLINIC | Age: 68
End: 2018-07-12
Payer: COMMERCIAL

## 2018-07-12 DIAGNOSIS — Z47.89 AFTERCARE FOLLOWING SURGERY OF THE MUSCULOSKELETAL SYSTEM: ICD-10-CM

## 2018-07-12 DIAGNOSIS — M25.519 SHOULDER PAIN: ICD-10-CM

## 2018-07-12 PROCEDURE — 97110 THERAPEUTIC EXERCISES: CPT | Mod: GP | Performed by: PHYSICAL THERAPIST

## 2018-07-12 NOTE — TELEPHONE ENCOUNTER
Pt called back.  He states he has had that type of injection in his hip before and is not ready to do that just yet.  States the pain has gotten a bit better but is still there so was hoping we can call something in to [facility] for him.  Please advise.  Barb Michele

## 2018-07-13 RX ORDER — GABAPENTIN 300 MG/1
CAPSULE ORAL
Qty: 90 CAPSULE | Refills: 0 | Status: SHIPPED | OUTPATIENT
Start: 2018-07-13 | End: 2018-09-26

## 2018-07-13 NOTE — TELEPHONE ENCOUNTER
Will send Rx for gabapentin to mail-order pharmacy. I still think it would be a good idea for him to see the spine specialists for further evaluation and recommendation. He should also follow up with myself or his PCP in a month to recheck his back pain while taking gabapentin.    Gurmeet Urbina,   7/13/2018 2:39 PM

## 2018-08-01 ENCOUNTER — THERAPY VISIT (OUTPATIENT)
Dept: PHYSICAL THERAPY | Facility: CLINIC | Age: 68
End: 2018-08-01
Payer: COMMERCIAL

## 2018-08-01 DIAGNOSIS — M25.519 SHOULDER PAIN: ICD-10-CM

## 2018-08-01 DIAGNOSIS — Z47.89 AFTERCARE FOLLOWING SURGERY OF THE MUSCULOSKELETAL SYSTEM: ICD-10-CM

## 2018-08-01 PROCEDURE — 97110 THERAPEUTIC EXERCISES: CPT | Mod: GP | Performed by: PHYSICAL THERAPIST

## 2018-08-01 NOTE — PROGRESS NOTES
Subjective:  HPI                    Objective:  System    Physical Exam    General     ROS    Assessment/Plan:    DISCHARGE REPORT    Progress reporting period is from 4/13/18 to 8/1/18 (15 visits).       SUBJECTIVE  Subjective changes noted by patient:  Mynor reports that his left shoulder is doing well.  He has been biking without any issues and reports that he has been able to lift heavy objects without weakness or pain.  His shoulder continues to pop often, but is not painful.       Current pain level is 0/10  .     Previous pain level was   Initial Pain level: 8/10.   Changes in function:  Yes (See Goal flowsheet attached for changes in current functional level)  Adverse reaction to treatment or activity: None    OBJECTIVE  Changes noted in objective findings:  AROM: Full symmetrical AROM in all directions.  PROM: EX=330, IR=31, Flex=154, Abd=158.  MMT: ER and flex are 5-/5, all others 5/5.        ASSESSMENT/PLAN  Updated problem list and treatment plan: Diagnosis 1:  Left shoulder SAD with bicep tenodesis    STG/LTGs have been met or progress has been made towards goals:  Yes (See Goal flow sheet completed today.)  Assessment of Progress: The patient's condition is improving.  Self Management Plans:  Patient has been instructed in a home treatment program.    Mynor continues to require the following intervention to meet STG and LTG's:  PT intervention is no longer required to meet STG/LTG.    Recommendations:  This patient is ready to be discharged from therapy and continue their home treatment program.    Please refer to the daily flowsheet for treatment today, total treatment time and time spent performing 1:1 timed codes.

## 2018-08-01 NOTE — LETTER
[facility]  [address]  [facility] [address]  [phone]    2018    Re: Mynor Curry   :   1950  MRN:  [id number]   REFERRING PHYSICIAN:   Lazaro Cobb    [facility]    Date of Initial Evaluation:  2018  Visits:  Rxs Used: 5  Reason for Referral:     Shoulder pain  Aftercare following surgery of the musculoskeletal system    DISCHARGE REPORT  Progress reporting period is from 18 to 18 (15 visits).     SUBJECTIVE  Subjective changes noted by patient:  Mynor reports that his left shoulder is doing well.  He has been biking without any issues and reports that he has been able to lift heavy objects without weakness or pain.  His shoulder continues to pop often, but is not painful.       Current pain level is 0/10  .     Previous pain level was   Initial Pain level: 8/10.   Changes in function:  Yes (See Goal flowsheet attached for changes in current functional level)  Adverse reaction to treatment or activity: None  OBJECTIVE  Changes noted in objective findings:  AROM: Full symmetrical AROM in all directions.  PROM: PS=058, IR=31, Flex=154, Abd=158.  MMT: ER and flex are 5-/5, all others 5/5.    ASSESSMENT/PLAN  Updated problem list and treatment plan: Diagnosis 1:  Left shoulder SAD with bicep tenodesis    STG/LTGs have been met or progress has been made towards goals:  Yes (See Goal flow sheet completed today.)  Assessment of Progress: The patient's condition is improving.  Self Management Plans:  Patient has been instructed in a home treatment program.  Mynor continues to require the following intervention to meet STG and LTG's:  PT intervention is no longer required to meet STG/LTG.  Recommendations:  This patient is ready to be discharged from therapy and continue their home treatment program.    Thank you for your referral.    INQUIRIES  Therapist: Diego Hobson, PT  [facility]  [place]  [id number] [place] [address]  Phone: [phone]  Fax: [phone]

## 2018-08-10 ENCOUNTER — TRANSFERRED RECORDS (OUTPATIENT)
Dept: HEALTH INFORMATION MANAGEMENT | Facility: CLINIC | Age: 68
End: 2018-08-10

## 2018-09-10 ENCOUNTER — TRANSFERRED RECORDS (OUTPATIENT)
Dept: HEALTH INFORMATION MANAGEMENT | Facility: CLINIC | Age: 68
End: 2018-09-10

## 2018-09-20 DIAGNOSIS — E78.5 HYPERLIPIDEMIA LDL GOAL <130: ICD-10-CM

## 2018-09-20 DIAGNOSIS — A60.02 HERPES SIMPLEX INFECTION OF OTHER SITE OF MALE GENITAL ORGAN: ICD-10-CM

## 2018-09-24 DIAGNOSIS — I10 ESSENTIAL HYPERTENSION WITH GOAL BLOOD PRESSURE LESS THAN 140/90: ICD-10-CM

## 2018-09-24 RX ORDER — LOSARTAN POTASSIUM AND HYDROCHLOROTHIAZIDE 12.5; 1 MG/1; MG/1
1 TABLET ORAL DAILY
Qty: 90 TABLET | Refills: 2 | Status: SHIPPED | OUTPATIENT
Start: 2018-09-24 | End: 2019-06-05

## 2018-09-24 NOTE — TELEPHONE ENCOUNTER
Patient should have refills available through [facility] mail order. This request is from [facility]. Can we contact patient to find out which pharmacy he is using? Thank you.  Polly Kincaid RN, BSN  [facility]

## 2018-09-25 RX ORDER — ROSUVASTATIN CALCIUM 20 MG/1
20 TABLET, COATED ORAL DAILY
Qty: 90 TABLET | Refills: 1 | Status: SHIPPED | OUTPATIENT
Start: 2018-09-25 | End: 2019-02-11

## 2018-09-25 RX ORDER — VALACYCLOVIR HYDROCHLORIDE 500 MG/1
500 TABLET, FILM COATED ORAL 2 TIMES DAILY
Qty: 6 TABLET | Refills: 0 | Status: SHIPPED | OUTPATIENT
Start: 2018-09-25 | End: 2019-01-23

## 2018-09-25 RX ORDER — ROSUVASTATIN CALCIUM 20 MG/1
20 TABLET, COATED ORAL DAILY
Qty: 30 TABLET | Refills: 0 | Status: SHIPPED | OUTPATIENT
Start: 2018-09-25 | End: 2018-10-15

## 2018-09-25 RX ORDER — VALACYCLOVIR HYDROCHLORIDE 500 MG/1
TABLET, FILM COATED ORAL
Qty: 30 TABLET | Refills: 3 | Status: SHIPPED | OUTPATIENT
Start: 2018-09-25 | End: 2019-04-17

## 2018-09-25 NOTE — TELEPHONE ENCOUNTER
Called pt to confirm.  He is in fact changing to [facility].  He is afraid though that he will not be home when the refill is mailed to him as [place] is leaving the [place].  Can we send the main rx to [facility] mail order and also send a 30 day supply to [facility] in [place] on [address]?  Barb Michele

## 2018-09-26 ENCOUNTER — OFFICE VISIT (OUTPATIENT)
Dept: FAMILY MEDICINE | Facility: CLINIC | Age: 68
End: 2018-09-26
Payer: COMMERCIAL

## 2018-09-26 ENCOUNTER — TRANSFERRED RECORDS (OUTPATIENT)
Dept: HEALTH INFORMATION MANAGEMENT | Facility: CLINIC | Age: 68
End: 2018-09-26

## 2018-09-26 VITALS
HEIGHT: 70 IN | HEART RATE: 82 BPM | TEMPERATURE: 97.7 F | WEIGHT: 183 LBS | OXYGEN SATURATION: 97 % | SYSTOLIC BLOOD PRESSURE: 122 MMHG | BODY MASS INDEX: 26.2 KG/M2 | DIASTOLIC BLOOD PRESSURE: 66 MMHG

## 2018-09-26 DIAGNOSIS — J06.9 UPPER RESPIRATORY TRACT INFECTION, UNSPECIFIED TYPE: Primary | ICD-10-CM

## 2018-09-26 PROCEDURE — 99213 OFFICE O/P EST LOW 20 MIN: CPT | Performed by: FAMILY MEDICINE

## 2018-09-26 RX ORDER — FLUTICASONE PROPIONATE 50 MCG
1-2 SPRAY, SUSPENSION (ML) NASAL DAILY
Qty: 1 BOTTLE | Refills: 0 | Status: SHIPPED | OUTPATIENT
Start: 2018-09-26 | End: 2020-02-11

## 2018-09-26 RX ORDER — AZITHROMYCIN 250 MG/1
TABLET, FILM COATED ORAL
Qty: 6 TABLET | Refills: 0 | Status: SHIPPED | OUTPATIENT
Start: 2018-09-26 | End: 2018-10-15

## 2018-09-26 NOTE — MR AVS SNAPSHOT
"              After Visit Summary   9/26/2018    Mynor Curry    MRN: [id number]           Patient Information     Date Of Birth          1950        Visit Information        Provider Department      9/26/2018 11:00 AM Gurmeet Urbina, DO [facility]        Today's Diagnoses     Upper respiratory tract infection, unspecified type    -  1       Follow-ups after your visit        Follow-up notes from your care team     Return if symptoms worsen or fail to improve.      Who to contact     If you have questions or need follow up information about today's clinic visit or your schedule please contact [facility] [place] directly at [phone].  Normal or non-critical lab and imaging results will be communicated to you by Telligent Systemshart, letter or phone within 4 business days after the clinic has received the results. If you do not hear from us within 7 days, please contact the clinic through Telligent Systemshart or phone. If you have a critical or abnormal lab result, we will notify you by phone as soon as possible.  Submit refill requests through Nambii or call your pharmacy and they will forward the refill request to us. Please allow 3 business days for your refill to be completed.          Additional Information About Your Visit        MyChart Information     Nambii gives you secure access to your electronic health record. If you see a primary care provider, you can also send messages to your care team and make appointments. If you have questions, please call your primary care clinic.  If you do not have a primary care provider, please call [phone] and they will assist you.        Care EveryWhere ID     This is your Care EveryWhere ID. This could be used by other organizations to access your [facility] medical records  [id number]        Your Vitals Were     Pulse Temperature Height Pulse Oximetry BMI (Body Mass Index)       82 97.7  F (36.5  C) (Oral) 5' 10\" (1.778 m) 97% 26.26 kg/m2        Blood Pressure " from Last 3 Encounters:   09/26/18 122/66   06/12/18 160/84   04/19/18 126/78    Weight from Last 3 Encounters:   09/26/18 183 lb (83 kg)   06/12/18 183 lb (83 kg)   04/19/18 182 lb 4.8 oz (82.7 kg)              Today, you had the following     No orders found for display         Today's Medication Changes          These changes are accurate as of 9/26/18 11:59 AM.  If you have any questions, ask your nurse or doctor.               Start taking these medicines.        Dose/Directions    azithromycin 250 MG tablet   Commonly known as:  ZITHROMAX   Used for:  Upper respiratory tract infection, unspecified type   Started by:  Gurmeet Urbina, DO        Two tablets first day, then one tablet daily for four days.   Quantity:  6 tablet   Refills:  0       fluticasone 50 MCG/ACT spray   Commonly known as:  FLONASE   Used for:  Upper respiratory tract infection, unspecified type   Started by:  Gurmeet Urbina, DO        Dose:  1-2 spray   Spray 1-2 sprays into both nostrils daily   Quantity:  1 Bottle   Refills:  0            Where to get your medicines      These medications were sent to [facility] [address] [place] [address] AT [address]  [address] [facility] [address]     Phone:  [phone]     azithromycin 250 MG tablet    fluticasone 50 MCG/ACT spray                Primary Care Provider Office Phone # Fax #    Doug Seo Jr., [phone] [address]  [place] [address]        Equal Access to Services     [facility]: Hadii aad ku hadasho Soomaali, waaxda luqadaha, qaybta kaalmada adeegyada, waxay idiin hayaan adeeg kharash la'aan . So [facility] [phone].    ATENCIÓN: Si habla español, tiene a salas disposición servicios gratuitos de asistencia lingüística. Llame al [phone].    We comply with applicable federal civil rights laws and [place] laws. We do not discriminate on the basis of race, color, national origin, age, disability,  sex, sexual orientation, or gender identity.            Thank you!     Thank you for choosing [facility] [place]  for your care. Our goal is always to provide you with excellent care. Hearing back from our patients is one way we can continue to improve our services. Please take a few minutes to complete the written survey that you may receive in the mail after your visit with us. Thank you!             Your Updated Medication List - Protect others around you: Learn how to safely use, store and throw away your medicines at www.disposemymeds.org.          This list is accurate as of 9/26/18 11:59 AM.  Always use your most recent med list.                   Brand Name Dispense Instructions for use Diagnosis    ADVIL PO      Take 400 mg by mouth every 4 hours as needed        aspirin 81 MG EC tablet      Take 1 tablet (81 mg) by mouth daily    Occlusion and stenosis of carotid artery without mention of cerebral infarction, Abnormal cardiovascular stress test       azithromycin 250 MG tablet    ZITHROMAX    6 tablet    Two tablets first day, then one tablet daily for four days.    Upper respiratory tract infection, unspecified type       * DAILY MULTIVITAMIN PO      Take by mouth daily        * multivitamin  with lutein Caps per capsule      Take 1 capsule by mouth daily        famotidine 20 MG tablet    PEPCID     Take 20 mg by mouth 2 times daily        fluticasone 50 MCG/ACT spray    FLONASE    1 Bottle    Spray 1-2 sprays into both nostrils daily    Upper respiratory tract infection, unspecified type       losartan-hydrochlorothiazide 100-12.5 MG per tablet    HYZAAR    90 tablet    Take 1 tablet by mouth daily    Essential hypertension with goal blood pressure less than 140/90       * rosuvastatin 20 MG tablet    CRESTOR    90 tablet    Take 1 tablet (20 mg) by mouth daily    Hyperlipidemia LDL goal <130       * rosuvastatin 20 MG tablet    CRESTOR    30 tablet    Take 1 tablet (20 mg) by mouth daily     Hyperlipidemia LDL goal <130       sildenafil 100 MG tablet    VIAGRA    6 tablet    Take 0.5-1 tablets ( mg) by mouth daily as needed    Erectile dysfunction due to arterial insufficiency       * valACYclovir 500 MG tablet    VALTREX    30 tablet    One tablet by mouth twice daily for three days for each outbreak    Herpes simplex infection of other site of male genital organ       * valACYclovir 500 MG tablet    VALTREX    6 tablet    Take 1 tablet (500 mg) by mouth 2 times daily for 3 days for each outbreak    Herpes simplex infection of other site of male genital organ       vitamin D 2000 units Caps      Take by mouth daily        * Notice:  This list has 6 medication(s) that are the same as other medications prescribed for you. Read the directions carefully, and ask your doctor or other care provider to review them with you.

## 2018-09-26 NOTE — PROGRESS NOTES
SUBJECTIVE:                                                    Mynor Curry is a 68 year old male who presents to clinic today for the following health issues:        Acute Illness   Acute illness concerns: sore throat   Onset: 10 day     Fever: no    Chills/Sweats: no    Headache (location?): no    Sinus Pressure:no    Conjunctivitis:  no    Ear Pain: no    Rhinorrhea: no    Congestion: YES- post nasal drip as well    Sore Throat: YES- first day then resolved and now started again in last day       Cough: YES-productive of yellow sputum, productive of green sputum    Wheeze: no    Decreased Appetite: no    Nausea: no    Vomiting: no    Diarrhea:  no    Dysuria/Freq.: no    Fatigue/Achiness: no    Sick/Strep Exposure: no     Therapies Tried and outcome:  Tylenol, otc decongestant for 3 days and then stopped      Problem list and histories reviewed & adjusted, as indicated.  Additional history: as documented    Patient Active Problem List   Diagnosis     Low back pain     S/P prosthetic total arthroplasty of the hip     Erectile dysfunction due to arterial insufficiency     Advanced directives, counseling/discussion     Essential hypertension with goal blood pressure less than 140/90     Benign non-nodular prostatic hyperplasia with lower urinary tract symptoms     Coronary artery disease     Hyperlipidemia LDL goal <70     Past Surgical History:   Procedure Laterality Date     ARTHROPLASTY HIP ANTERIOR  4/30/2014    Procedure: ARTHROPLASTY HIP ANTERIOR;  Surgeon: Ayaz Butcher MD;  Location: [facility]     ARTHROSCOPY KNEE WITH MEDIAL MENISCECTOMY  2001    left     ARTHROSCOPY SHOULDER  1999    right     COLONOSCOPY       HEMILAMINECTOMY, DISCECTOMY LUMBAR ONE LEVEL, COMBINED  1989     HERNIA REPAIR, INGUINAL RT/LT  2011    bilateral     ORTHOPEDIC SURGERY         Social History   Substance Use Topics     Smoking status: Never Smoker     Smokeless tobacco: Never Used     Alcohol use 0.0 oz/week     0  "Standard drinks or equivalent per week      Comment: 2-3 drinks per week     Family History   Problem Relation Age of Onset     Arthritis Mother      Hypertension Mother      Arthritis Father      Hypertension Father      Arrhythmia Father      atrial fib     C.A.D. No family hx of      Diabetes No family hx of      Prostate Cancer No family hx of      Cancer - colorectal No family hx of      mother with some polyps though           ROS:  Constitutional, HEENT, cardiovascular, pulmonary, gi and gu systems are negative, except as otherwise noted.    OBJECTIVE:     /66 (BP Location: Right arm, Patient Position: Chair, Cuff Size: Adult Regular)  Pulse 82  Temp 97.7  F (36.5  C) (Oral)  Ht 5' 10\" (1.778 m)  Wt 183 lb (83 kg)  SpO2 97%  BMI 26.26 kg/m2  Body mass index is 26.26 kg/(m^2).  GENERAL: healthy, alert and no distress  EYES: Eyes grossly normal to inspection, PERRL and conjunctivae and sclerae normal  HENT: ear canals and TM's normal, nose and mouth without ulcers or lesions  NECK: no adenopathy and no asymmetry, masses, or scars  RESP: lungs clear to auscultation - no rales, rhonchi or wheezes  CV: regular rate and rhythm, normal S1 S2, no S3 or S4, no murmur, click or rub, no peripheral edema and peripheral pulses strong  ABDOMEN: soft, nontender, no hepatosplenomegaly, no masses and bowel sounds normal  MS: no gross musculoskeletal defects noted, no edema  SKIN: no suspicious lesions or rashes    Diagnostic Test Results:  none     ASSESSMENT/PLAN:   1. Upper respiratory tract infection, unspecified type: with symptoms initially improving and then suddenly worsening, suspicious for possible secondary bacterial infection. Will treat with antibiotics. Start Flonase to help with postnasal drip. Continue other supportive cares as helpful, drink plenty of fluids, get rest.  - azithromycin (ZITHROMAX) 250 MG tablet; Two tablets first day, then one tablet daily for four days.  Dispense: 6 tablet; Refill: " 0  - fluticasone (FLONASE) 50 MCG/ACT spray; Spray 1-2 sprays into both nostrils daily  Dispense: 1 Bottle; Refill: 0    DO JACKIE Moore [facility] AZEVEDO

## 2018-10-15 ENCOUNTER — OFFICE VISIT (OUTPATIENT)
Dept: FAMILY MEDICINE | Facility: CLINIC | Age: 68
End: 2018-10-15
Payer: COMMERCIAL

## 2018-10-15 ENCOUNTER — TRANSFERRED RECORDS (OUTPATIENT)
Dept: HEALTH INFORMATION MANAGEMENT | Facility: CLINIC | Age: 68
End: 2018-10-15

## 2018-10-15 VITALS
HEART RATE: 68 BPM | TEMPERATURE: 97.7 F | DIASTOLIC BLOOD PRESSURE: 78 MMHG | HEIGHT: 70 IN | SYSTOLIC BLOOD PRESSURE: 138 MMHG | OXYGEN SATURATION: 98 % | WEIGHT: 180 LBS | BODY MASS INDEX: 25.77 KG/M2

## 2018-10-15 DIAGNOSIS — R05.9 COUGH: Primary | ICD-10-CM

## 2018-10-15 DIAGNOSIS — Z23 NEED FOR INFLUENZA VACCINATION: ICD-10-CM

## 2018-10-15 PROCEDURE — 99214 OFFICE O/P EST MOD 30 MIN: CPT | Performed by: PHYSICIAN ASSISTANT

## 2018-10-15 NOTE — PATIENT INSTRUCTIONS
Suspect part of this was viral given started as a cold and likely was exacerbated by your increased biking since you've gotten at least 50% better since returning home and resting.  I agree that bacterial cause seems less likely given you've already had 2 antibiotics and no evidence for pneumonia on exam.  May be more likely due to GERD/heartburn. Start trial of omeprazole.  Re-check if not improving over the next couple of weeks.   If worse would consider chest x-ray.

## 2018-10-15 NOTE — PROGRESS NOTES
"  SUBJECTIVE:   Mynor Curry is a 68 year old male who presents to clinic today for the following health issues:      Medication Followup of Zithromax and Flonase for a cold/cough  Evaluated 9/26 by Dr. Urbina and planning to go to [place] so was treated with azithromycin. He reports biking 30 miles per day.  He was also evaluated by ortho for back pain so reports he was also given prednisone taper for at least 12 days. This didn't help his lungs though.  Pt admits he is a  so he also took some amoxicillin he had on hand - took 2000mg daily for 10 days.  Does not think that it's bacterial since antibiotics didn't help.  Feels it's more due to post-nasal drip.  Productive in the morning - phlegm was yellow and now is pretty clear.  Stopped his decongestant as thought that was drying cough out too much.   No hx of asthma  Did start to get allergies this year - thinks tree pollens.  Stopped taking zyrtec when he started to get sick.   Started as a cold though with sore throat, nasal congestion. These parts have all resolved excluding the cough.   As the day goes on he seems to get a bit more hoarse.  Has been resting since he got home and hasn't been biking. Estimates he is 50% better since returning home and not biking.  Non-smoker.  Overall feels clearer when he breathes.     Endorses some increase in his heartburn lately too. Seems that anything makes it worse even peanut butter. States when he was traveling her \"really burned through the antacids.\" Admits he takes a lot of ibuprofen for arthritis.   Taking pepcid as needed - admits he has increased to taking this twice daily some days.   Admits he started regimen with pepcid in the past due to a morning cough.   No EGD to date.  No hematemesis, melena or hematochezia.  Tea - has caffeine in this.  Wine - every other day 1 glass.  Has chocolate at night before bedtime.       Taking Medication as prescribed: yes    Side Effects:  None    Medication " Helping Symptoms: Never seemed to improved - feels like a post-nasal drip persistent productive cough       Problem list and histories reviewed & adjusted, as indicated.  Additional history: as documented    Patient Active Problem List   Diagnosis     Low back pain     S/P prosthetic total arthroplasty of the hip     Erectile dysfunction due to arterial insufficiency     Advanced directives, counseling/discussion     Essential hypertension with goal blood pressure less than 140/90     Benign non-nodular prostatic hyperplasia with lower urinary tract symptoms     Coronary artery disease     Hyperlipidemia LDL goal <70     Past Surgical History:   Procedure Laterality Date     ARTHROPLASTY HIP ANTERIOR  4/30/2014    Procedure: ARTHROPLASTY HIP ANTERIOR;  Surgeon: Ayaz Butcher MD;  Location: [facility]     ARTHROSCOPY KNEE WITH MEDIAL MENISCECTOMY  2001    left     ARTHROSCOPY SHOULDER  1999    right     COLONOSCOPY       HEMILAMINECTOMY, DISCECTOMY LUMBAR ONE LEVEL, COMBINED  1989     HERNIA REPAIR, INGUINAL RT/LT  2011    bilateral     ORTHOPEDIC SURGERY         Social History   Substance Use Topics     Smoking status: Never Smoker     Smokeless tobacco: Never Used     Alcohol use 0.0 oz/week     0 Standard drinks or equivalent per week      Comment: 2-3 drinks per week     Family History   Problem Relation Age of Onset     Arthritis Mother      Hypertension Mother      Arthritis Father      Hypertension Father      Arrhythmia Father      atrial fib     C.A.D. No family hx of      Diabetes No family hx of      Prostate Cancer No family hx of      Cancer - colorectal No family hx of      mother with some polyps though         Current Outpatient Prescriptions   Medication Sig Dispense Refill     omeprazole (PRILOSEC) 20 MG CR capsule Take 1 capsule (20 mg) by mouth daily 30 capsule 1     aspirin EC 81 MG tablet Take 1 tablet (81 mg) by mouth daily       Cholecalciferol (VITAMIN D) 2000 UNITS CAPS Take by mouth  "daily       famotidine (PEPCID) 20 MG tablet Take 20 mg by mouth 2 times daily       fluticasone (FLONASE) 50 MCG/ACT spray Spray 1-2 sprays into both nostrils daily 1 Bottle 0     Ibuprofen (ADVIL PO) Take 400 mg by mouth every 4 hours as needed        losartan-hydrochlorothiazide (HYZAAR) 100-12.5 MG per tablet Take 1 tablet by mouth daily 90 tablet 2     Multiple Vitamins-Minerals (DAILY MULTIVITAMIN PO) Take by mouth daily       multivitamin  with lutein (OCUVITE WITH LTEIN) CAPS per capsule Take 1 capsule by mouth daily       rosuvastatin (CRESTOR) 20 MG tablet Take 1 tablet (20 mg) by mouth daily 90 tablet 1     sildenafil (VIAGRA) 100 MG tablet Take 0.5-1 tablets ( mg) by mouth daily as needed 6 tablet 11     valACYclovir (VALTREX) 500 MG tablet One tablet by mouth twice daily for three days for each outbreak 30 tablet 3     [DISCONTINUED] rosuvastatin (CRESTOR) 20 MG tablet Take 1 tablet (20 mg) by mouth daily 30 tablet 0     Allergies   Allergen Reactions     Augmentin      vomiting       Reviewed and updated as needed this visit by clinical staff  Tobacco  Allergies  Meds  Med Hx  Surg Hx  Fam Hx  Soc Hx      Reviewed and updated as needed this visit by Provider  Tobacco  Allergies  Meds  Med Hx  Surg Hx  Fam Hx  Soc Hx        ROS:  Constitutional, HEENT, cardiovascular, pulmonary, gi and gu systems are negative, except as otherwise noted.    OBJECTIVE:     /78 (BP Location: Right arm, Cuff Size: Adult Regular)  Pulse 68  Temp 97.7  F (36.5  C) (Oral)  Ht 5' 10\" (1.778 m)  Wt 180 lb (81.6 kg)  SpO2 98%  BMI 25.83 kg/m2  Body mass index is 25.83 kg/(m^2).  GENERAL: healthy, alert and no distress  EYES: Eyes grossly normal to inspection, PERRL and conjunctivae and sclerae normal  HENT: ear canals and TM's normal, nose and mouth without ulcers or lesions. Pharynx non-erythematous. No tonsillar enlargement or hypertrophy.   NECK: no adenopathy and no asymmetry, masses, or " scars  RESP: lungs clear to auscultation - no rales, rhonchi or wheezes  CV: regular rates and rhythm and no murmur, click or rub    Diagnostic Test Results:  none     ASSESSMENT/PLAN:       ICD-10-CM    1. Cough R05 omeprazole (PRILOSEC) 20 MG CR capsule   2. Need for influenza vaccination - pt will return for this when feeling well.  Z23    Reviewed potential causes including viral versus post-infectious inflammation versus underlying allergies versus GERD.  Reassuring breath sounds and vitals. Has already taken 2 antibiotics to date, thus, seems less likely to be bacterial.   50% may have been due to virus as you're feeling better overall.  Residual cough could be due to GERD especially since he's had a long-standing history of this in the past. Complete trial of omeprazole, but advised to follow-up if not improving and would consider CXR versus EGD pending symptoms.  Pt in agreement with plan.   Long-term risks of PPI therapy reviewed as well.   See Patient Instructions  Patient Instructions   Suspect part of this was viral given started as a cold and likely was exacerbated by your increased biking since you've gotten at least 50% better since returning home and resting.  I agree that bacterial cause seems less likely given you've already had 2 antibiotics and no evidence for pneumonia on exam.  May be more likely due to GERD/heartburn. Start trial of omeprazole.  Re-check if not improving over the next couple of weeks.   If worse would consider chest x-ray.    Leeann Carrillo PA-C  [facility]

## 2018-10-15 NOTE — MR AVS SNAPSHOT
After Visit Summary   10/15/2018    Mynor Curry    MRN: [id number]           Patient Information     Date Of Birth          1950        Visit Information        Provider Department      10/15/2018 11:00 AM Leeann Carrillo PA-C [facility]        Today's Diagnoses     Cough    -  1      Care Instructions    Suspect part of this was viral given started as a cold and likely was exacerbated by your increased biking since you've gotten at least 50% better since returning home and resting.  I agree that bacterial cause seems less likely given you've already had 2 antibiotics and no evidence for pneumonia on exam.  May be more likely due to GERD/heartburn. Start trial of omeprazole.  Re-check if not improving over the next couple of weeks.   If worse would consider chest x-ray.          Follow-ups after your visit        Follow-up notes from your care team     Return if symptoms worsen or fail to improve.      Who to contact     If you have questions or need follow up information about today's clinic visit or your schedule please contact [facility] directly at [phone].  Normal or non-critical lab and imaging results will be communicated to you by B-Obvioushart, letter or phone within 4 business days after the clinic has received the results. If you do not hear from us within 7 days, please contact the clinic through eyesFindert or phone. If you have a critical or abnormal lab result, we will notify you by phone as soon as possible.  Submit refill requests through Store Eyes or call your pharmacy and they will forward the refill request to us. Please allow 3 business days for your refill to be completed.          Additional Information About Your Visit        B-Obvioushart Information     Store Eyes gives you secure access to your electronic health record. If you see a primary care provider, you can also send messages to your care team and make appointments. If you have questions,  "please call your primary care clinic.  If you do not have a primary care provider, please call [phone] and they will assist you.        Care EveryWhere ID     This is your Care EveryWhere ID. This could be used by other organizations to access your [place] medical records  [id number]        Your Vitals Were     Pulse Temperature Height Pulse Oximetry BMI (Body Mass Index)       68 97.7  F (36.5  C) (Oral) 5' 10\" (1.778 m) 98% 25.83 kg/m2        Blood Pressure from Last 3 Encounters:   10/15/18 138/78   09/26/18 122/66   06/12/18 160/84    Weight from Last 3 Encounters:   10/15/18 180 lb (81.6 kg)   09/26/18 183 lb (83 kg)   06/12/18 183 lb (83 kg)              Today, you had the following     No orders found for display         Today's Medication Changes          These changes are accurate as of 10/15/18 11:47 AM.  If you have any questions, ask your nurse or doctor.               Start taking these medicines.        Dose/Directions    omeprazole 20 MG CR capsule   Commonly known as:  priLOSEC   Used for:  Cough   Started by:  Leeann Carrillo PA-C        Dose:  20 mg   Take 1 capsule (20 mg) by mouth daily   Quantity:  30 capsule   Refills:  1         These medicines have changed or have updated prescriptions.        Dose/Directions    rosuvastatin 20 MG tablet   Commonly known as:  CRESTOR   This may have changed:  Another medication with the same name was removed. Continue taking this medication, and follow the directions you see here.   Used for:  Hyperlipidemia LDL goal <130   Changed by:  Leeann Carrillo PA-C        Dose:  20 mg   Take 1 tablet (20 mg) by mouth daily   Quantity:  90 tablet   Refills:  1         Stop taking these medicines if you haven't already. Please contact your care team if you have questions.     azithromycin 250 MG tablet   Commonly known as:  ZITHROMAX   Stopped by:  Leeann Carrillo PA-C                Where to get your medicines      These " medications were sent to [facility] - [place], [place] - [address]  [address] [facility] [address]     Phone:  [phone]     omeprazole 20 MG CR capsule                Primary Care Provider Office Phone # Fax #    Doug Seo Jr., [phone] [address]  [place] [address]        Equal Access to Services     [facility] : Hadii aad ku hadasho Soomaali, waaxda luqadaha, qaybta kaalmada adeegyada, waxay idiin hayaan adeeg kharash la'aan ah. So [facility] [phone].    ATENCIÓN: Si habla español, tiene a salas disposición servicios gratuitos de asistencia lingüística. [facility] [phone].    We comply with applicable federal civil rights laws and [place] laws. We do not discriminate on the basis of race, color, national origin, age, disability, sex, sexual orientation, or gender identity.            Thank you!     Thank you for choosing [facility]  for your care. Our goal is always to provide you with excellent care. Hearing back from our patients is one way we can continue to improve our services. Please take a few minutes to complete the written survey that you may receive in the mail after your visit with us. Thank you!             Your Updated Medication List - Protect others around you: Learn how to safely use, store and throw away your medicines at www.disposemymeds.org.          This list is accurate as of 10/15/18 11:47 AM.  Always use your most recent med list.                   Brand Name Dispense Instructions for use Diagnosis    ADVIL PO      Take 400 mg by mouth every 4 hours as needed        aspirin 81 MG EC tablet      Take 1 tablet (81 mg) by mouth daily    Occlusion and stenosis of carotid artery without mention of cerebral infarction, Abnormal cardiovascular stress test       * DAILY MULTIVITAMIN PO      Take by mouth daily        * multivitamin  with lutein Caps per capsule      Take 1 capsule by mouth daily         famotidine 20 MG tablet    PEPCID     Take 20 mg by mouth 2 times daily        fluticasone 50 MCG/ACT spray    FLONASE    1 Bottle    Spray 1-2 sprays into both nostrils daily    Upper respiratory tract infection, unspecified type       losartan-hydrochlorothiazide 100-12.5 MG per tablet    HYZAAR    90 tablet    Take 1 tablet by mouth daily    Essential hypertension with goal blood pressure less than 140/90       omeprazole 20 MG CR capsule    priLOSEC    30 capsule    Take 1 capsule (20 mg) by mouth daily    Cough       rosuvastatin 20 MG tablet    CRESTOR    90 tablet    Take 1 tablet (20 mg) by mouth daily    Hyperlipidemia LDL goal <130       sildenafil 100 MG tablet    VIAGRA    6 tablet    Take 0.5-1 tablets ( mg) by mouth daily as needed    Erectile dysfunction due to arterial insufficiency       valACYclovir 500 MG tablet    VALTREX    30 tablet    One tablet by mouth twice daily for three days for each outbreak    Herpes simplex infection of other site of male genital organ       vitamin D 2000 units Caps      Take by mouth daily        * Notice:  This list has 2 medication(s) that are the same as other medications prescribed for you. Read the directions carefully, and ask your doctor or other care provider to review them with you.

## 2018-11-06 ENCOUNTER — ALLIED HEALTH/NURSE VISIT (OUTPATIENT)
Dept: NURSING | Facility: CLINIC | Age: 68
End: 2018-11-06
Payer: COMMERCIAL

## 2018-11-06 DIAGNOSIS — Z23 NEED FOR PROPHYLACTIC VACCINATION AND INOCULATION AGAINST INFLUENZA: Primary | ICD-10-CM

## 2018-11-06 PROCEDURE — 90662 IIV NO PRSV INCREASED AG IM: CPT

## 2018-11-06 PROCEDURE — G0008 ADMIN INFLUENZA VIRUS VAC: HCPCS

## 2018-11-06 NOTE — MR AVS SNAPSHOT
After Visit Summary   11/6/2018    Mynor Curry    MRN: [id number]           Patient Information     Date Of Birth          1950        Visit Information        Provider Department      11/6/2018 3:30 PM SV MA/LPN [facility]        Today's Diagnoses     Need for prophylactic vaccination and inoculation against influenza    -  1       Follow-ups after your visit        Your next 10 appointments already scheduled     Nov 06, 2018  3:30 PM CST   Nurse Only with NURA MA/LPN   [facility] ([facility])    [address]  [facility] [id number] [phone]            Jan 23, 2019  9:00 AM CST   PHYSICAL with Doug Seo Jr., MD   [facility] ([facility])    [address]  [facility] [address] [phone]              Who to contact     If you have questions or need follow up information about today's clinic visit or your schedule please contact [facility] directly at [phone].  Normal or non-critical lab and imaging results will be communicated to you by [employer], letter or phone within 4 business days after the clinic has received the results. If you do not hear from us within 7 days, please contact the clinic through [employer] or phone. If you have a critical or abnormal lab result, we will notify you by phone as soon as possible.  Submit refill requests through [employer] or call your pharmacy and they will forward the refill request to us. Please allow 3 business days for your refill to be completed.          Additional Information About Your Visit        [employer] Information     [employer] gives you secure access to your electronic health record. If you see a primary care provider, you can also send messages to your care team and make appointments. If you have questions, please call your primary care clinic.  If you do not have a primary care provider, please call [phone] and they will assist you.        Care  EveryWhere ID     This is your Care EveryWhere ID. This could be used by other organizations to access your [place] medical records  [id number]         Blood Pressure from Last 3 Encounters:   10/15/18 138/78   09/26/18 122/66   06/12/18 160/84    Weight from Last 3 Encounters:   10/15/18 180 lb (81.6 kg)   09/26/18 183 lb (83 kg)   06/12/18 183 lb (83 kg)              We Performed the Following     FLU VACCINE, INCREASED ANTIGEN, PRESV FREE, AGE 65+ [27962]     Vaccine Administration, Initial [43014]        Primary Care Provider Office Phone # Fax #    Doug Seo Jr., [phone] [address] [address]        Equal Access to Services     MILENA LEDESMA : Hadii clem oneal hadasho Soomaali, waaxda luqadaha, qaybta kaalmada adeegyada, vladimir mixon hayreynaldo perez . So [facility] [phone].    ATENCIÓN: Si habla español, tiene a salas disposición servicios gratuitos de asistencia lingüística. Llame al [phone].    We comply with applicable federal civil rights laws and [place] laws. We do not discriminate on the basis of race, color, national origin, age, disability, sex, sexual orientation, or gender identity.            Thank you!     Thank you for choosing [facility] (formerly [facility])[3]  for your care. Our goal is always to provide you with excellent care. Hearing back from our patients is one way we can continue to improve our services. Please take a few minutes to complete the written survey that you may receive in the mail after your visit with us. Thank you!             Your Updated Medication List - Protect others around you: Learn how to safely use, store and throw away your medicines at www.disposemymeds.org.          This list is accurate as of 11/6/18  2:52 PM.  Always use your most recent med list.                   Brand Name Dispense Instructions for use Diagnosis    ADVIL PO      Take 400 mg by mouth every 4 hours as needed        aspirin 81 MG EC tablet       Take 1 tablet (81 mg) by mouth daily    Occlusion and stenosis of carotid artery without mention of cerebral infarction, Abnormal cardiovascular stress test       * DAILY MULTIVITAMIN PO      Take by mouth daily        * multivitamin  with lutein Caps per capsule      Take 1 capsule by mouth daily        famotidine 20 MG tablet    PEPCID     Take 20 mg by mouth 2 times daily        fluticasone 50 MCG/ACT spray    FLONASE    1 Bottle    Spray 1-2 sprays into both nostrils daily    Upper respiratory tract infection, unspecified type       losartan-hydrochlorothiazide 100-12.5 MG per tablet    HYZAAR    90 tablet    Take 1 tablet by mouth daily    Essential hypertension with goal blood pressure less than 140/90       omeprazole 20 MG CR capsule    priLOSEC    30 capsule    Take 1 capsule (20 mg) by mouth daily    Cough       rosuvastatin 20 MG tablet    CRESTOR    90 tablet    Take 1 tablet (20 mg) by mouth daily    Hyperlipidemia LDL goal <130       sildenafil 100 MG tablet    VIAGRA    6 tablet    Take 0.5-1 tablets ( mg) by mouth daily as needed    Erectile dysfunction due to arterial insufficiency       valACYclovir 500 MG tablet    VALTREX    30 tablet    One tablet by mouth twice daily for three days for each outbreak    Herpes simplex infection of other site of male genital organ       vitamin D 2000 units Caps      Take by mouth daily        * Notice:  This list has 2 medication(s) that are the same as other medications prescribed for you. Read the directions carefully, and ask your doctor or other care provider to review them with you.

## 2018-11-06 NOTE — PROGRESS NOTES

## 2018-11-12 ENCOUNTER — TRANSFERRED RECORDS (OUTPATIENT)
Dept: HEALTH INFORMATION MANAGEMENT | Facility: CLINIC | Age: 68
End: 2018-11-12

## 2018-11-26 ENCOUNTER — TRANSFERRED RECORDS (OUTPATIENT)
Dept: HEALTH INFORMATION MANAGEMENT | Facility: CLINIC | Age: 68
End: 2018-11-26

## 2018-11-26 DIAGNOSIS — R05.9 COUGH: ICD-10-CM

## 2018-11-26 NOTE — TELEPHONE ENCOUNTER
"Requested Prescriptions   Pending Prescriptions Disp Refills     omeprazole (PRILOSEC) 20 MG DR capsule [Pharmacy Med Name: OMEPRAZOLE 20 MG Capsule Delayed Release]  Last Written Prescription Date:  10/15/2018  Last Fill Quantity: 30 capsule,  # refills: 1   Last office visit: 10/15/2018 with prescribing provider:  Lorena Jeffries Office Visit:   Next 5 appointments (look out 90 days)     Jan 23, 2019  9:00 AM CST   PHYSICAL with Doug Seo Jr., MD   [facility] ([facility]    [address] [facility] [address]   [phone]                    60 capsule 1     Sig: TAKE 1 CAPSULE (20 MG) BY MOUTH DAILY    PPI Protocol Passed    11/26/2018  3:51 PM       Passed - Not on Clopidogrel (unless Pantoprazole ordered)       Passed - No diagnosis of osteoporosis on record       Passed - Recent (12 mo) or future (30 days) visit within the authorizing provider's specialty    Patient had office visit in the last 12 months or has a visit in the next 30 days with authorizing provider or within the authorizing provider's specialty.  See \"Patient Info\" tab in inbasket, or \"Choose Columns\" in Meds & Orders section of the refill encounter.             Passed - Patient is age 18 or older          "

## 2018-11-27 ENCOUNTER — TRANSFERRED RECORDS (OUTPATIENT)
Dept: HEALTH INFORMATION MANAGEMENT | Facility: CLINIC | Age: 68
End: 2018-11-27

## 2018-12-06 ENCOUNTER — TRANSFERRED RECORDS (OUTPATIENT)
Dept: HEALTH INFORMATION MANAGEMENT | Facility: CLINIC | Age: 68
End: 2018-12-06

## 2019-01-10 DIAGNOSIS — R05.9 COUGH: ICD-10-CM

## 2019-01-10 NOTE — TELEPHONE ENCOUNTER
Further refills can be addressed at upcoming office visit on 1/23/19.  SHIRLEY JarquinN, RN  [facility]

## 2019-01-10 NOTE — TELEPHONE ENCOUNTER
"Requested Prescriptions   Pending Prescriptions Disp Refills     omeprazole (PRILOSEC) 20 MG DR capsule [Pharmacy Med Name: OMEPRAZOLE 20 MG Capsule Delayed Release]  Last Written Prescription Date:  11/27/2018  Last Fill Quantity: 30 capsule,  # refills: 1   Last Office Visit: 10/15/2018   Future Office Visit:    Next 5 appointments (look out 90 days)    Jan 23, 2019  9:00 AM CST  PHYSICAL with Doug Seo Jr., MD  [facility] ([facility]) [address] [facility] [id number] [phone]          60 capsule 1     Sig: TAKE 1 CAPSULE (20 MG) BY MOUTH DAILY    PPI Protocol Passed - 1/10/2019  1:26 PM       Passed - Not on Clopidogrel (unless Pantoprazole ordered)       Passed - No diagnosis of osteoporosis on record       Passed - Recent (12 mo) or future (30 days) visit within the authorizing provider's specialty    Patient had office visit in the last 12 months or has a visit in the next 30 days with authorizing provider or within the authorizing provider's specialty.  See \"Patient Info\" tab in inbasket, or \"Choose Columns\" in Meds & Orders section of the refill encounter.             Passed - Medication is active on med list       Passed - Patient is age 18 or older          "

## 2019-01-21 ENCOUNTER — TRANSFERRED RECORDS (OUTPATIENT)
Dept: HEALTH INFORMATION MANAGEMENT | Facility: CLINIC | Age: 69
End: 2019-01-21

## 2019-01-22 ASSESSMENT — ACTIVITIES OF DAILY LIVING (ADL): CURRENT_FUNCTION: NO ASSISTANCE NEEDED

## 2019-01-23 ENCOUNTER — OFFICE VISIT (OUTPATIENT)
Dept: FAMILY MEDICINE | Facility: CLINIC | Age: 69
End: 2019-01-23
Payer: MEDICARE

## 2019-01-23 VITALS
BODY MASS INDEX: 25.62 KG/M2 | HEIGHT: 70 IN | OXYGEN SATURATION: 98 % | SYSTOLIC BLOOD PRESSURE: 132 MMHG | WEIGHT: 179 LBS | TEMPERATURE: 97.7 F | DIASTOLIC BLOOD PRESSURE: 78 MMHG | HEART RATE: 72 BPM

## 2019-01-23 DIAGNOSIS — I25.10 CORONARY ARTERY DISEASE INVOLVING NATIVE CORONARY ARTERY OF NATIVE HEART WITHOUT ANGINA PECTORIS: ICD-10-CM

## 2019-01-23 DIAGNOSIS — K21.9 GASTROESOPHAGEAL REFLUX DISEASE, ESOPHAGITIS PRESENCE NOT SPECIFIED: ICD-10-CM

## 2019-01-23 DIAGNOSIS — I10 ESSENTIAL HYPERTENSION WITH GOAL BLOOD PRESSURE LESS THAN 140/90: ICD-10-CM

## 2019-01-23 DIAGNOSIS — Z12.5 SCREENING FOR PROSTATE CANCER: ICD-10-CM

## 2019-01-23 DIAGNOSIS — Z00.00 ENCOUNTER FOR ROUTINE ADULT HEALTH EXAMINATION WITHOUT ABNORMAL FINDINGS: Primary | ICD-10-CM

## 2019-01-23 PROCEDURE — 36415 COLL VENOUS BLD VENIPUNCTURE: CPT | Performed by: FAMILY MEDICINE

## 2019-01-23 PROCEDURE — 80061 LIPID PANEL: CPT | Performed by: FAMILY MEDICINE

## 2019-01-23 PROCEDURE — G0103 PSA SCREENING: HCPCS | Performed by: FAMILY MEDICINE

## 2019-01-23 PROCEDURE — 80048 BASIC METABOLIC PNL TOTAL CA: CPT | Performed by: FAMILY MEDICINE

## 2019-01-23 PROCEDURE — 99397 PER PM REEVAL EST PAT 65+ YR: CPT | Performed by: FAMILY MEDICINE

## 2019-01-23 PROCEDURE — 82043 UR ALBUMIN QUANTITATIVE: CPT | Performed by: FAMILY MEDICINE

## 2019-01-23 RX ORDER — GABAPENTIN 300 MG/1
300 CAPSULE ORAL
COMMUNITY

## 2019-01-23 ASSESSMENT — MIFFLIN-ST. JEOR: SCORE: 1578.19

## 2019-01-23 ASSESSMENT — ACTIVITIES OF DAILY LIVING (ADL): CURRENT_FUNCTION: NO ASSISTANCE NEEDED

## 2019-01-23 NOTE — PROGRESS NOTES
"SUBJECTIVE:   Mynor Curry is a 68 year old male who presents for Preventive Visit.    Are you in the first 12 months of your Medicare coverage?  No    Annual Wellness Visit     In general, how would you rate your overall health?  Good    Frequency of exercise:  2-3 days/week    Duration of exercise:  15-30 minutes    Do you usually eat at least 4 servings of fruit and vegetables a day, include whole grains    & fiber and avoid regularly eating high fat or \"junk\" foods?  Yes    Taking medications regularly:  Yes    Medication side effects:  None    Ability to successfully perform activities of daily living:  No assistance needed    Home Safety:  No safety concerns identified    Hearing Impairment:  No hearing concerns    In the past 6 months, have you been bothered by leaking of urine?  No    In general, how would you rate your overall mental or emotional health?  Excellent    PHQ-2 Total Score: 0    Additional concerns today:  Yes    Do you feel safe in your environment? Yes    Do you have a Health Care Directive? Yes: Advance Directive has been received and scanned.    Fall risk  Fallen 2 or more times in the past year?: No  Any fall with injury in the past year?: No  Cognitive Screening   1) Repeat 3 items (Leader, Season, Table)    2) Clock draw: NORMAL  3) 3 item recall: Recalls 3 objects  Results: 3 items recalled: COGNITIVE IMPAIRMENT LESS LIKELY    Mini-CogTM Copyright NATE Fletcher. Licensed by the author for use in [facility]; reprinted with permission (marilou@.[facility]). All rights reserved.      Do you have sleep apnea, excessive snoring or daytime drowsiness?: no    Reviewed and updated as needed this visit by clinical staff  Tobacco  Allergies  Meds  Problems  Med Hx  Surg Hx  Fam Hx  Soc Hx        Reviewed and updated as needed this visit by Provider  Meds  Problems        Social History     Tobacco Use     Smoking status: Never Smoker     Smokeless tobacco: Never Used   Substance Use " Topics     Alcohol use: Yes     Alcohol/week: 0.0 oz     Comment: 2-3 drinks per week     Alcohol Use 1/22/2019   If you drink alcohol do you typically have greater than 3 drinks per day OR greater than 7 drinks per week? No   No flowsheet data found.        Preventative measures- Pt has received both pneumonia vaccines as well as completed the flu vaccine this year. He had colonoscopy about 9 years ago that was normal. No  concerns today; no urethral discharge, testicular masses, groin pain, or dysuria.    Right shoulder pain- Pt is going to have a 2nd surgery on his right shoulder. He states he fell on his bike in [place] when he was on a bike tour and he went to the hospital there to do an XR. An MRI back in the [place] showed a tear in his shoulder. He is scheduled to have a rotator cuff repair surgery within the next 1 year.    Low back pain- He has had 4 cortisone injections in the past 4 months for his back. He has multiple bulging discs in his back. He has an appointment with a back surgeon, but pt states the back surgeon told him that he thinks pt does not need surgery.    Urinary concern- Pt states his BPH is contributing to a slow urinary stream. He experiences nocturia 1X per night.    Acid reflux- Pt states his Omeprazole has helped improve his acid reflux. He is still currently taking Omeprazole. Within last 3 weeks he has tried to make significant changes to his diet so that he can get off of his Omeprazole to avoid the risks with long term use. He has cut out peanut butter, chocolate, and fatty foods that normally exacerbated his acid reflux. He has tried taking Zantac in the past but has not worked as well.      Current providers sharing in care for this patient include:   Patient Care Team:  Doug Seo Jr., MD as PCP - General (Family Practice)  Gurmeet Urbina DO as PCP - Assigned PCP    The following health maintenance items are reviewed in Epic and correct as of today:  Health  "Maintenance   Topic Date Due     ZOSTER IMMUNIZATION (2 of 3) 09/11/2013     LIPID MONITORING Q1 YEAR  01/16/2019     MICROALBUMIN Q1 YEAR  01/16/2019     PSA Q1 YR  01/16/2019     FALL RISK ASSESSMENT  01/16/2019     BMP Q1 YR  04/05/2019     PHQ-2 Q1 YR  01/23/2020     ADVANCE DIRECTIVE PLANNING Q5 YRS  08/31/2020     COLON CANCER SCREEN (SYSTEM ASSIGNED)  12/21/2020     DTAP/TDAP/TD IMMUNIZATION (2 - Td) 06/14/2023     INFLUENZA VACCINE  Completed     AORTIC ANEURYSM SCREENING (SYSTEM ASSIGNED)  Completed     HEPATITIS C SCREENING  Completed     IPV IMMUNIZATION  Aged Out     MENINGITIS IMMUNIZATION  Aged Out     Pneumonia Vaccine: Series completed.    Review of Systems  Constitutional, HEENT, cardiovascular, pulmonary, gi and gu systems are negative, except as otherwise noted.    This document serves as a record of the services and decisions personally performed and made by Doug Seo MD. It was created on his behalf by Kishor Sow, a trained medical scribe. The creation of this document is based on the provider's statements to the medical scribe.  Kishor Sow 9:20 AM January 23, 2019    OBJECTIVE:   /78   Pulse 72   Temp 97.7  F (36.5  C) (Tympanic)   Ht 1.778 m (5' 10\")   Wt 81.2 kg (179 lb)   SpO2 98%   BMI 25.68 kg/m   Estimated body mass index is 25.68 kg/m  as calculated from the following:    Height as of this encounter: 1.778 m (5' 10\").    Weight as of this encounter: 81.2 kg (179 lb).  Physical Exam  GENERAL: healthy, alert and no distress  EYES: Eyes grossly normal to inspection, PERRL and conjunctivae and sclerae normal  HENT: ear canals and TM's normal, nose and mouth without ulcers or lesions  NECK: no adenopathy, no asymmetry, masses, or scars and thyroid normal to palpation  RESP: lungs clear to auscultation - no rales, rhonchi or wheezes  CV: regular rate and rhythm, normal S1 S2, no S3 or S4, no murmur, click or rub, no peripheral edema and peripheral pulses " strong  ABDOMEN: soft, nontender, no hepatosplenomegaly, no masses and bowel sounds normal  MS: no gross musculoskeletal defects noted, no edema  SKIN: no suspicious lesions or rashes  NEURO: Normal strength and tone, mentation intact and speech normal  PSYCH: mentation appears normal, affect normal/bright    Diagnostic Test Results:  No results found for this or any previous visit (from the past 24 hour(s)).    ASSESSMENT / PLAN:     (Z00.00) Encounter for routine adult health examination without abnormal findings  (primary encounter diagnosis)  Comment: Pt is UTD for the most part on his preventative measures. Encouraged healthy diet and exercise in order to reduce further exacerbation of his CAD. Lastly, recommended pt receive the first dose of the Shingrix vaccine; it is over 97% effective at preventing shingles outbreak and subsequent PHN. Discussed with pt that previous Zostavax was only 50% effective at preventing shingles outbreak. After discussion, I advised pt to follow up with his insurance to make sure they will cover this vaccine before he goes ahead and receives the first dose.  Plan: Next physical due 1/23/20.    (Z12.5) Screening for prostate cancer  Comment: Advised pt that guidelines show we stop doing PSA at 69, and given his last one was within normal limits, I discussed with pt that we do not need to do anymore prostate cancer screening if he does not want to. However, after discussion, pt wants to continue doing prostate cancer screening through a PSA in order to stay on top of things. Advised pt that I am okay with continuing to check a PSA, but made him aware that a PSA does not differentiate between advanced metastasizing prostate cancer vs slow growing prostate cancer. Therefore, if his PSA is above 4.0, will refer him to urology.  Plan: PROSTATE SPEC ANTIGEN SCREEN        Follow up based on labs.    (I25.10) Coronary artery disease involving native coronary artery of native heart without  angina pectoris  Comment: Will recheck lipid panel today. In the interim, will have him continue taking his statin and aspirin as prescribed.  Plan: Lipid panel reflex to direct LDL Fasting        Follow up based on labs.    (I10) Essential hypertension with goal blood pressure less than 140/90  Comment: His BP was good today at 132/78; will have him continue with his losartan-hydrochlorothiazide and recheck a BMP. I also recommended we recheck a urine albumin today so he does not have to come back to do this in 3 months.  Plan: Albumin Random Urine Quantitative with Creat         Ratio, Basic metabolic panel  (Ca, Cl, CO2,         Creat, Gluc, K, Na, BUN)        Follow up based on labs.    (K21.9) Gastroesophageal reflux disease, esophagitis presence not specified  Comment: Pt is still currently taking Omeprazole which has controlled his acid reflux well. However, discussed osteoporosis, chronic kidney disease and increased C.difficile colitis infection concerns with long term proton pump inhibitor use. Therefore, advised pt that my plan is to eventually get him off of Omeprazole and replace this with safer alternative medications in the form of H2 antagonist such as Zantac.  Plan: Follow up if needed.    End of Life Planning:  Patient currently has an advanced directive: Yes.  Practitioner is supportive of decision.    COUNSELING:  Reviewed preventive health counseling, as reflected in patient instructions       Regular exercise       Healthy diet/nutrition       Immunizations    UTD         Hepatitis C screening       HIV screening for high risk patient       Colon cancer screening       Prostate cancer screening       The ASCVD Risk score (Stephan TADEO Jr., et al., 2013) failed to calculate for the following reasons:    The valid total cholesterol range is 130 to 320 mg/dL    BP Readings from Last 1 Encounters:   01/23/19 132/78     Estimated body mass index is 25.68 kg/m  as calculated from the following:    Height  "as of this encounter: 1.778 m (5' 10\").    Weight as of this encounter: 81.2 kg (179 lb).     reports that  has never smoked. he has never used smokeless tobacco.    Appropriate preventive services were discussed with this patient, including applicable screening as appropriate for cardiovascular disease, diabetes, osteopenia/osteoporosis, and glaucoma.  As appropriate for age/gender, discussed screening for colorectal cancer, prostate cancer, breast cancer, and cervical cancer. Checklist reviewing preventive services available has been given to the patient.    Reviewed patients plan of care and provided an AVS. The Basic Care Plan (routine screening as documented in Health Maintenance) for Mynor meets the Care Plan requirement. This Care Plan has been established and reviewed with the Patient.    Counseling Resources:  ATP IV Guidelines  Pooled Cohorts Equation Calculator  Breast Cancer Risk Calculator  FRAX Risk Assessment  ICSI Preventive Guidelines  Dietary Guidelines for Americans, 2010  USDA's MyPlate  ASA Prophylaxis  Lung CA Screening    The information in this document, created by the medical scribe for me, accurately reflects the services I personally performed and the decisions made by me. I have reviewed and approved this document for accuracy prior to leaving the patient care area.  January 23, 2019 9:20 AM    Doug Seo Jr, MD  [facility]  "

## 2019-01-23 NOTE — PATIENT INSTRUCTIONS
Preventive Health Recommendations:     See your health care provider every year to    Review health changes.     Discuss preventive care.      Review your medicines if your doctor has prescribed any.    Talk with your health care provider about whether you should have a test to screen for prostate cancer (PSA).    Every 3 years, have a diabetes test (fasting glucose). If you are at risk for diabetes, you should have this test more often.    Every 5 years, have a cholesterol test. Have this test more often if you are at risk for high cholesterol or heart disease.     Every 10 years, have a colonoscopy. Or, have a yearly FIT test (stool test). These exams will check for colon cancer.    Talk to with your health care provider about screening for Abdominal Aortic Aneurysm if you have a family history of AAA or have a history of smoking.  Shots:     Get a flu shot each year.     Get a tetanus shot every 10 years.     Talk to your doctor about your pneumonia vaccines. There are now two you should receive - Pneumovax (PPSV 23) and Prevnar (PCV 13).    Talk to your pharmacist about a shingles vaccine.     Talk to your doctor about the hepatitis B vaccine.  Nutrition:     Eat at least 5 servings of fruits and vegetables each day.     Eat whole-grain bread, whole-wheat pasta and brown rice instead of white grains and rice.     Get adequate Calcium and Vitamin D.   Lifestyle    Exercise for at least 150 minutes a week (30 minutes a day, 5 days a week). This will help you control your weight and prevent disease.     Limit alcohol to one drink per day.     No smoking.     Wear sunscreen to prevent skin cancer.     See your dentist every six months for an exam and cleaning.     See your eye doctor every 1 to 2 years to screen for conditions such as glaucoma, macular degeneration and cataracts.    Personalized Prevention Plan  You are due for the preventive services outlined below.  Your care team is available to assist you in  scheduling these services.  If you have already completed any of these items, please share that information with your care team to update in your medical record.    Health Maintenance Due   Topic Date Due     Zoster (Chicken Pox) Vaccine (2 of 3) 09/11/2013     Cholesterol Lab - yearly  01/16/2019     Microalbumin Lab - yearly  01/16/2019     Prostate Test (PSA) - yearly  01/16/2019     FALL RISK ASSESSMENT  01/16/2019       Please contact your insurance company to find out the cheapest place to obtain the new shingles vaccine series called Shingrix.

## 2019-01-23 NOTE — NURSING NOTE
"Chief Complaint   Patient presents with     Physical     pt is fasting        Initial /78   Pulse 72   Temp 97.7  F (36.5  C) (Tympanic)   Ht 1.778 m (5' 10\")   Wt 81.2 kg (179 lb)   SpO2 98%   BMI 25.68 kg/m   Estimated body mass index is 25.68 kg/m  as calculated from the following:    Height as of this encounter: 1.778 m (5' 10\").    Weight as of this encounter: 81.2 kg (179 lb).  BP completed using cuff size: regular    Health Maintenance Due   Topic Date Due     ZOSTER IMMUNIZATION (2 of 3) 09/11/2013     LIPID MONITORING Q1 YEAR  01/16/2019     MICROALBUMIN Q1 YEAR  01/16/2019     PSA Q1 YR  01/16/2019     FALL RISK ASSESSMENT  01/16/2019         Magdalena Harrison MA 1/23/19        "

## 2019-01-24 LAB
ANION GAP SERPL CALCULATED.3IONS-SCNC: 8 MMOL/L (ref 3–14)
BUN SERPL-MCNC: 24 MG/DL (ref 7–30)
CALCIUM SERPL-MCNC: 9.6 MG/DL (ref 8.5–10.1)
CHLORIDE SERPL-SCNC: 103 MMOL/L (ref 94–109)
CHOLEST SERPL-MCNC: 140 MG/DL
CO2 SERPL-SCNC: 25 MMOL/L (ref 20–32)
CREAT SERPL-MCNC: 0.89 MG/DL (ref 0.66–1.25)
GFR SERPL CREATININE-BSD FRML MDRD: 86 ML/MIN/{1.73_M2}
GLUCOSE SERPL-MCNC: 87 MG/DL (ref 70–99)
HDLC SERPL-MCNC: 66 MG/DL
LDLC SERPL CALC-MCNC: 65 MG/DL
NONHDLC SERPL-MCNC: 74 MG/DL
POTASSIUM SERPL-SCNC: 3.8 MMOL/L (ref 3.4–5.3)
PSA SERPL-ACNC: 1.48 UG/L (ref 0–4)
SODIUM SERPL-SCNC: 136 MMOL/L (ref 133–144)
TRIGL SERPL-MCNC: 46 MG/DL

## 2019-01-25 LAB
CREAT UR-MCNC: 116 MG/DL
MICROALBUMIN UR-MCNC: 10 MG/L
MICROALBUMIN/CREAT UR: 8.38 MG/G CR (ref 0–17)

## 2019-01-25 NOTE — RESULT ENCOUNTER NOTE
Mr. Curry,    -PSA (prostate specific antigen) test is normal.  -Cholesterol levels are at your goal levels.  ADVISE: continuing your medication, a regular exercise program with at least 150 minutes of aerobic exercise per week, and eating a low saturated fat/low carbohydrate diet.  Also, you should recheck this fasting cholesterol panel in 12 months.  -Kidney function is normal (Cr, GFR), Sodium is normal, Potassium is normal, Calcium is normal, Glucose is normal.     If you have further questions about the interpretation of your labs, [email or web address] is a good website to check out for further information.    Please contact the clinic if you have additional questions.  Thank you.    Sincerely,    Doug Seo MD

## 2019-01-29 NOTE — RESULT ENCOUNTER NOTE
Mr. Curry,    -Microalbumin (urine protein) test is normal.  ADVISE: rechecking this annually and continuing to take your blood pressure medication as you're doing.    If you have further questions about the interpretation of your labs, [email or web address] is a good website to check out for further information.    Please contact the clinic if you have additional questions.  Thank you.    Sincerely,    Doug Seo MD

## 2019-02-11 DIAGNOSIS — E78.5 HYPERLIPIDEMIA LDL GOAL <130: ICD-10-CM

## 2019-02-11 NOTE — TELEPHONE ENCOUNTER
"Requested Prescriptions   Pending Prescriptions Disp Refills     rosuvastatin (CRESTOR) 20 MG tablet [Pharmacy Med Name: ROSUVASTATIN CALCIUM 20 MG Tablet]  Last Written Prescription Date:  9/25/18  Last Fill Quantity: 90,  # refills: 1   Last office visit: 1/23/2019 with prescribing provider:  Rayray   Future Office Visit:   Next 5 appointments (look out 90 days)    Feb 20, 2019 10:00 AM CST  Pre-Op physical with Gurmeet Urbina,   [facility] [address] [facility] [address]  [phone]          90 tablet 1     Sig: TAKE 1 TABLET EVERY DAY    Statins Protocol Passed - 2/11/2019  2:12 PM       Passed - LDL on file in past 12 months    Recent Labs   Lab Test 01/23/19  0945   LDL 65            Passed - No abnormal creatine kinase in past 12 months    No lab results found.            Passed - Recent (12 mo) or future (30 days) visit within the authorizing provider's specialty    Patient had office visit in the last 12 months or has a visit in the next 30 days with authorizing provider or within the authorizing provider's specialty.  See \"Patient Info\" tab in inbasket, or \"Choose Columns\" in Meds & Orders section of the refill encounter.             Passed - Medication is active on med list       Passed - Patient is age 18 or older          "

## 2019-02-12 RX ORDER — ROSUVASTATIN CALCIUM 20 MG/1
TABLET, COATED ORAL
Qty: 90 TABLET | Refills: 3 | Status: SHIPPED | OUTPATIENT
Start: 2019-02-12 | End: 2019-12-04

## 2019-02-19 ENCOUNTER — THERAPY VISIT (OUTPATIENT)
Dept: PHYSICAL THERAPY | Facility: CLINIC | Age: 69
End: 2019-02-19
Payer: MEDICARE

## 2019-02-19 DIAGNOSIS — G89.29 CHRONIC LOW BACK PAIN WITHOUT SCIATICA: ICD-10-CM

## 2019-02-19 DIAGNOSIS — M54.50 CHRONIC LOW BACK PAIN WITHOUT SCIATICA: ICD-10-CM

## 2019-02-19 PROCEDURE — 97110 THERAPEUTIC EXERCISES: CPT | Mod: GP | Performed by: PHYSICAL THERAPIST

## 2019-02-19 PROCEDURE — 97161 PT EVAL LOW COMPLEX 20 MIN: CPT | Mod: GP | Performed by: PHYSICAL THERAPIST

## 2019-02-19 NOTE — PROGRESS NOTES
[facility] Initial Evaluation  Subjective:  Pt describes bilateral LBP, right > left.  He also has intermittent mild pain into his right anterior thigh.  His pain has been much better following an injection he received in his back in late January 2019.  This was the fourth injection for his back since October 2018 and reduced his pain much more so than the previous injections.  Began having right low back and right thigh pain in February of 2018 after snow blowing.  The leg pain resolved and the back pain reduced following PT then, but the back pain persisted and eventually became worse over time.  He had an MRI in January 2019 showing lumbar central stenosis and spondylosis.  He was referred to PT on 2/6/19.  He has a torn RTC in his left shoulder and is scheduled for a RCR on 2/28/19.      The history is provided by the patient.     Condition occurred with:  Degenerative joint disease.    This is a chronic condition     Patient reports pain:  Lower lumbar spine, lumbar spine left and lumbar spine right.  Radiates to:  Thigh right.  Pain is described as stabbing and aching (aching in right thigh) and is constant and reported as 6/10.  Associated symptoms:  Loss of motion/stiffness. Pain is worse in the A.M. and worse in the P.M..  Symptoms are exacerbated by standing, lifting and walking Relieved by: sitting.  Since onset symptoms are rapidly improving (since the injection in January 2019).  Special tests:  MRI.  Previous treatment includes physical therapy (Spring 2018).  There was moderate improvement following previous treatment.  General health as reported by patient is excellent.                  Barriers include:  None as reported by the patient.    Red flags:  None as reported by the patient.                        Objective:  Standing Alignment:    Cervical/Thoracic:  Forward head and thoracic kyphosis increased    Lumbar:  Lordosis decr                           Lumbar/SI  Evaluation  ROM:    AROM Lumbar:   Flexion:            Full  Ext:                    Significant loss   Side Bend:        Left:  Moderate loss    Right:  Moderate loss  Rotation:           Left:  Moderate loss    Right:  Moderate loss  Side Glide:        Left:     Right:           Lumbar Myotomes:  normal            Lumbar DTR's:  normal        Lumbar Dermtomes:  normal                Neural Tension/Mobility:  Lumbar:  Normal                                                             General     ROS    Assessment/Plan:    Patient is a 68 year old male with lumbar complaints.    Patient has the following significant findings with corresponding treatment plan.                Diagnosis 1:  Lumbar stenosis and spondylosis  Pain -  hot/cold therapy, self management, education and home program  Decreased ROM/flexibility - manual therapy, therapeutic exercise and home program  Decreased strength - therapeutic exercise, therapeutic activities and home program    Therapy Evaluation Codes:   1) History comprised of:   Personal factors that impact the plan of care:      None.    Comorbidity factors that impact the plan of care are:      Left shoulder RCR.     Medications impacting care: None.  2) Examination of Body Systems comprised of:   Body structures and functions that impact the plan of care:      Lumbar spine.   Activity limitations that impact the plan of care are:      Lifting, Standing and Walking.  3) Clinical presentation characteristics are:   Stable/Uncomplicated.  4) Decision-Making    Low complexity using standardized patient assessment instrument and/or measureable assessment of functional outcome.  Cumulative Therapy Evaluation is: Low complexity.    Previous and current functional limitations:  (See Goal Flow Sheet for this information)    Short term and Long term goals: (See Goal Flow Sheet for this information)     Communication ability:  Patient appears to be able to clearly communicate and understand  verbal and written communication and follow directions correctly.  Treatment Explanation - The following has been discussed with the patient:   RX ordered/plan of care  Anticipated outcomes  Possible risks and side effects  This patient would benefit from PT intervention to resume normal activities.   Rehab potential is good.    Frequency:  1 X week, once daily  Duration:  for 6 weeks  Discharge Plan:  Achieve all LTG.  Independent in home treatment program.  Reach maximal therapeutic benefit.    Please refer to the daily flowsheet for treatment today, total treatment time and time spent performing 1:1 timed codes.

## 2019-02-19 NOTE — LETTER
DEPARTMENT OF HEALTH AND HUMAN SERVICES  CENTERS FOR MEDICARE & MEDICAID SERVICES    PLAN/UPDATED PLAN OF PROGRESS FOR OUTPATIENT REHABILITATION    PATIENTS NAME:  Mynor Curry   : 1950    PROVIDER NUMBER:    [id number]    HICN:  [id number]    PROVIDER NAME: [facility]    MEDICAL RECORD NUMBER: [id number]     START OF CARE DATE:  SOC Date: 19   TYPE:  PT    PRIMARY/TREATMENT DIAGNOSIS: (Pertinent Medical Diagnosis)  Chronic low back pain without sciatica    VISITS FROM START OF CARE:  Rxs Used: 1     [facility] Initial Evaluation  Subjective:  Pt describes bilateral LBP, right > left.  He also has intermittent mild pain into his right anterior thigh.  His pain has been much better following an injection he received in his back in late 2019.  This was the fourth injection for his back since 2018 and reduced his pain much more so than the previous injections.  Began having right low back and right thigh pain in 2018 after snow blowing.  The leg pain resolved and the back pain reduced following PT then, but the back pain persisted and eventually became worse over time.  He had an MRI in 2019 showing lumbar central stenosis and spondylosis.  He was referred to PT on 19.  He has a torn RTC in his left shoulder and is scheduled for a RCR on 19.  The history is provided by the patient.   Condition occurred with:  Degenerative joint disease.  This is a chronic condition     Patient reports pain:  Lower lumbar spine, lumbar spine left and lumbar spine right.  Radiates to:  Thigh right.  Pain is described as stabbing and aching (aching in right thigh) and is constant and reported as 6/10.  Associated symptoms:  Loss of motion/stiffness. Pain is worse in the A.M. and worse in the P.M..  Symptoms are exacerbated by standing, lifting and walking Relieved by: sitting.  Since onset symptoms are rapidly improving (since  the injection in 2019).  Special tests:  MRI.  Previous treatment includes physical therapy (Spring 2018).  There was moderate improvement following previous treatment.  General health as reported by patient is excellent.                Barriers include:  None as reported by the patient.  Red flags:  None as reported by the patient.  Surgical history: mulitple, back, shoulder, hip, knee, wrists.  Current medications:  High blood pressure medication and anti-inflammatory.  Current occupation is Retired  Primary job tasks include:  Lifting and driving (carrying).  Red flags:  Pain at night/rest                        PATIENTS NAME:  Mynor Curry   : 1950    Objective:  Standing Alignment:    Cervical/Thoracic:  Forward head and thoracic kyphosis increased  Lumbar:  Lordosis decr     Lumbar/SI Evaluation  ROM:    AROM Lumbar:   Flexion:            Full  Ext:                    Significant loss   Side Bend:        Left:  Moderate loss    Right:  Moderate loss  Rotation:           Left:  Moderate loss    Right:  Moderate loss  Side Glide:        Left:     Right:     Lumbar Myotomes:  normal  Lumbar DTR's:  normal  Lumbar Dermtomes:  normal  Neural Tension/Mobility:  Lumbar:  Normal      Assessment/Plan:    Patient is a 68 year old male with lumbar complaints.    Patient has the following significant findings with corresponding treatment plan.                Diagnosis 1:  Lumbar stenosis and spondylosis  Pain -  hot/cold therapy, self management, education and home program  Decreased ROM/flexibility - manual therapy, therapeutic exercise and home program  Decreased strength - therapeutic exercise, therapeutic activities and home program    Therapy Evaluation Codes:   1) History comprised of:   Personal factors that impact the plan of care:      None.    Comorbidity factors that impact the plan of care are:      Left shoulder RCR.     Medications impacting care: None.  2) Examination of Body Systems  "comprised of:   Body structures and functions that impact the plan of care:      Lumbar spine.   Activity limitations that impact the plan of care are:      Lifting, Standing and Walking.  3) Clinical presentation characteristics are:   Stable/Uncomplicated.  4) Decision-Making    Low complexity using standardized patient assessment instrument and/or measureable assessment of functional outcome.            PATIENTS NAME:  Mynor Curry   : 1950      Cumulative Therapy Evaluation is: Low complexity.  Previous and current functional limitations:  (See Goal Flow Sheet for this information)    Short term and Long term goals: (See Goal Flow Sheet for this information)   Communication ability:  Patient appears to be able to clearly communicate and understand verbal and written communication and follow directions correctly.  Treatment Explanation - The following has been discussed with the patient:   RX ordered/plan of care  Anticipated outcomes  Possible risks and side effects  This patient would benefit from PT intervention to resume normal activities.   Rehab potential is good.    Frequency:  1 X week, once daily  Duration:  for 6 weeks  Discharge Plan:  Achieve all LTG.  Independent in home treatment program.  Reach maximal therapeutic benefit.                                           .          Caregiver Signature/Credentials _____________________________ Date ________       Treating Provider: Diego Hobson, PT   I have reviewed and certified the need for these services and plan of treatment while under my care.        PHYSICIAN'S SIGNATURE:   _________________________________________  Date___________   Ar Meier MD    Certification period:  Beginning of Cert date period: 19 to  End of Cert period date: 19     Functional Level Progress Report: Please see attached \"Goal Flow sheet for Functional level.\"    ____X____ Continue Services or       ________ DC Services                Service " dates: From  SOC Date: 02/19/19 date to present

## 2019-02-19 NOTE — PROGRESS NOTES
[facility] Initial Evaluation  Subjective:                                           Surgical history: mulitple, back, shoulder, hip, knee, wrists.  Current medications:  High blood pressure medication and anti-inflammatory.  Current occupation is Retired  .    Primary job tasks include:  Lifting and driving (carrying).        Red flags:  Pain at night/rest.                        Objective:  System    Physical Exam    General     ROS    Assessment/Plan:

## 2019-02-20 ENCOUNTER — OFFICE VISIT (OUTPATIENT)
Dept: FAMILY MEDICINE | Facility: CLINIC | Age: 69
End: 2019-02-20
Payer: MEDICARE

## 2019-02-20 VITALS
BODY MASS INDEX: 25.77 KG/M2 | WEIGHT: 180 LBS | OXYGEN SATURATION: 97 % | DIASTOLIC BLOOD PRESSURE: 62 MMHG | HEIGHT: 70 IN | TEMPERATURE: 98.2 F | HEART RATE: 78 BPM | SYSTOLIC BLOOD PRESSURE: 120 MMHG

## 2019-02-20 DIAGNOSIS — I10 ESSENTIAL HYPERTENSION WITH GOAL BLOOD PRESSURE LESS THAN 140/90: ICD-10-CM

## 2019-02-20 DIAGNOSIS — Z01.818 PREOP GENERAL PHYSICAL EXAM: Primary | ICD-10-CM

## 2019-02-20 DIAGNOSIS — I25.10 CORONARY ARTERY DISEASE INVOLVING NATIVE CORONARY ARTERY OF NATIVE HEART WITHOUT ANGINA PECTORIS: ICD-10-CM

## 2019-02-20 DIAGNOSIS — M75.102 TEAR OF LEFT ROTATOR CUFF, UNSPECIFIED TEAR EXTENT: ICD-10-CM

## 2019-02-20 LAB — HGB BLD-MCNC: 14 G/DL (ref 13.3–17.7)

## 2019-02-20 PROCEDURE — 93000 ELECTROCARDIOGRAM COMPLETE: CPT | Performed by: FAMILY MEDICINE

## 2019-02-20 PROCEDURE — 85018 HEMOGLOBIN: CPT | Performed by: FAMILY MEDICINE

## 2019-02-20 PROCEDURE — 99215 OFFICE O/P EST HI 40 MIN: CPT | Performed by: FAMILY MEDICINE

## 2019-02-20 PROCEDURE — 36415 COLL VENOUS BLD VENIPUNCTURE: CPT | Performed by: FAMILY MEDICINE

## 2019-02-20 ASSESSMENT — MIFFLIN-ST. JEOR: SCORE: 1582.72

## 2019-02-20 NOTE — PROGRESS NOTES
[facility]  [address] [address] [phone]  Dept: [phone]    PRE-OP EVALUATION:  Today's date: 2019    Mynor Curry (: 1950) presents for pre-operative evaluation assessment as requested by Dr. Cobb.  He requires evaluation and anesthesia risk assessment prior to undergoing surgery/procedure for treatment of Lt Rotator cuff  .    Proposed Surgery/ Procedure:Lt Rotator cuff repair   Date of Surgery/ Procedure: 19  Time of Surgery/ Procedure: 6:30 am  Hospital/Surgical Facility: [facility]   Fax number for surgical facility: [phone]  Primary Physician: Doug Seo Jr.  Type of Anesthesia Anticipated: General    Patient has a Health Care Directive or Living Will:  YES     1. NO - Do you have a history of heart attack, stroke, stent, bypass or surgery on an artery in the head, neck, heart or legs?  2. NO - Do you ever have any pain or discomfort in your chest?  3. NO - Do you have a history of  Heart Failure?  4. NO - Are you troubled by shortness of breath when: walking on the level, up a slight hill or at night?  5. NO - Do you currently have a cold, bronchitis or other respiratory infection?  6. NO - Do you have a cough, shortness of breath or wheezing?  7. NO - Do you sometimes get pains in the calves of your legs when you walk?  8. NO - Do you or anyone in your family have previous history of blood clots?  9. NO - Do you or does anyone in your family have a serious bleeding problem such as prolonged bleeding following surgeries or cuts?  10. NO - Have you ever had problems with anemia or been told to take iron pills?  11. NO - Have you had any abnormal blood loss such as black, tarry or bloody stools, or abnormal vaginal bleeding?  12. NO - Have you ever had a blood transfusion?  13. NO - Have you or any of your relatives ever had problems with anesthesia?  14. NO - Do you have sleep apnea, excessive snoring or daytime  drowsiness?  15. NO - Do you have any prosthetic heart valves?  16. YES - Do you have prosthetic joints?  17. NO - Is there any chance that you may be pregnant?      HPI:     HPI related to upcoming procedure: HARISH initially had tear in 6/17.  He underwent repair with acromion release and biceps tenodesis on 4/18/18. On 10/18, re injured shoulder.He has been following with [facility]. MRI demonstrated tear of rotator cuff.      CAD - Patient has a longstanding history of moderate-severe CAD. Patient denies recent chest pain or NTG use, denies exercise induced dyspnea or PND. Last Stress test 9/23/2015, EKG completed today and unchanged.     HYPERLIPIDEMIA - Patient has a long history of significant Hyperlipidemia requiring medication for treatment with recent good control. Patient reports no problems or side effects with the medication.                                                                                                                                                       .  HYPERTENSION - Patient has longstanding history of HTN , currently denies any symptoms referable to elevated blood pressure. Specifically denies chest pain, palpitations, dyspnea, orthopnea, PND or peripheral edema. Blood pressure readings have been in normal range. Current medication regimen is as listed below. Patient denies any side effects of medication.                                                                                                                                                          .    MEDICAL HISTORY:     Patient Active Problem List    Diagnosis Date Noted     Chronic low back pain without sciatica 02/19/2019     Priority: Medium     Coronary artery disease 02/04/2017     Priority: Medium     Cardiologist Dr. Ng at [facility]:  Feb 2017: an echocardiogram suggested the patient possibly had inferior hypokinesis and he had a stress nuclear scan which was abnormal.  These were as part of an  investigation for vasovagal syncope that he has had for the last 20 years, but was getting worse over the previous few months before he presented to our clinic.  He had a CT angiogram performed because of the abnormal stress test which showed only modest coronary artery disease.  I would refer you to my previous dictation about that.  He also had an MRI scan which showed that he did have normal wall motion and that there was no evidence of scar       Hyperlipidemia LDL goal <70 02/04/2017     Priority: Medium     Essential hypertension with goal blood pressure less than 140/90 10/17/2016     Priority: Medium     Benign non-nodular prostatic hyperplasia with lower urinary tract symptoms 10/17/2016     Priority: Medium     Erectile dysfunction due to arterial insufficiency 08/31/2015     Priority: Medium     Advanced directives, counseling/discussion 08/31/2015     Priority: Medium     Advance Care Planning 8/31/2015: ACP Review and Resources Provided:  Reviewed chart for advance care plan.  Mynor Curry has no plan or code status on file however states presence of ACP document. Copy requested. Confirmed code status reflects current choices pending receipt of document/advance care plan review. Confirmed/documented legally designated decision maker(s). Added by Doug Seo Jr.             S/P prosthetic total arthroplasty of the hip 04/30/2014     Priority: Medium     Low back pain 07/17/2013     Priority: Medium      Past Medical History:   Diagnosis Date     Arthritis      Coronary artery disease     Mild     HLD (hyperlipidemia)      Hypertension      Syncope     vasovagal     Past Surgical History:   Procedure Laterality Date     ARTHROPLASTY HIP ANTERIOR  4/30/2014    Procedure: ARTHROPLASTY HIP ANTERIOR;  Surgeon: Ayaz Butcher MD;  Location: [facility] OR     ARTHROSCOPY KNEE WITH MEDIAL MENISCECTOMY  2001    left     ARTHROSCOPY SHOULDER  1999    right     COLONOSCOPY       HEMILAMINECTOMY,  DISCECTOMY LUMBAR ONE LEVEL, COMBINED  1989     HERNIA REPAIR, INGUINAL RT/LT  2011    bilateral     ORTHOPEDIC SURGERY       Current Outpatient Medications   Medication Sig Dispense Refill     aspirin EC 81 MG tablet Take 1 tablet (81 mg) by mouth daily       Cholecalciferol (VITAMIN D) 2000 UNITS CAPS Take by mouth daily       famotidine (PEPCID) 20 MG tablet Take 20 mg by mouth 2 times daily       fluticasone (FLONASE) 50 MCG/ACT spray Spray 1-2 sprays into both nostrils daily 1 Bottle 0     gabapentin (NEURONTIN) 300 MG capsule Take 300 mg by mouth 3 times daily       Ibuprofen (ADVIL PO) Take 400 mg by mouth every 4 hours as needed        losartan-hydrochlorothiazide (HYZAAR) 100-12.5 MG per tablet Take 1 tablet by mouth daily 90 tablet 2     Multiple Vitamins-Minerals (DAILY MULTIVITAMIN PO) Take by mouth daily       multivitamin  with lutein (OCUVITE WITH LTEIN) CAPS per capsule Take 1 capsule by mouth daily       omeprazole (PRILOSEC) 20 MG DR capsule TAKE 1 CAPSULE (20 MG) BY MOUTH DAILY 30 capsule 1     rosuvastatin (CRESTOR) 20 MG tablet TAKE 1 TABLET EVERY DAY 90 tablet 3     sildenafil (VIAGRA) 100 MG tablet Take 0.5-1 tablets ( mg) by mouth daily as needed 6 tablet 11     valACYclovir (VALTREX) 500 MG tablet One tablet by mouth twice daily for three days for each outbreak 30 tablet 3     OTC products: Aspirin    Allergies   Allergen Reactions     Augmentin      vomiting      Latex Allergy: NO    Social History     Tobacco Use     Smoking status: Never Smoker     Smokeless tobacco: Never Used   Substance Use Topics     Alcohol use: Yes     Alcohol/week: 0.0 oz     Comment: 2-3 drinks per week     History   Drug Use No       REVIEW OF SYSTEMS:   CONSTITUTIONAL: NEGATIVE for fever, chills, change in weight  INTEGUMENTARY/SKIN: NEGATIVE for worrisome rashes, moles or lesions  EYES: NEGATIVE for vision changes or irritation  ENT/MOUTH: NEGATIVE for ear, mouth and throat problems  RESP: NEGATIVE for  "significant cough or SOB  BREAST: NEGATIVE for masses, tenderness or discharge  CV: NEGATIVE for chest pain, palpitations or peripheral edema  GI: NEGATIVE for nausea, abdominal pain, heartburn, or change in bowel habits  : NEGATIVE for frequency, dysuria, or hematuria  MUSCULOSKELETAL: NEGATIVE for significant arthralgias or myalgia  NEURO: NEGATIVE for weakness, dizziness or paresthesias  ENDOCRINE: NEGATIVE for temperature intolerance, skin/hair changes  HEME: NEGATIVE for bleeding problems  PSYCHIATRIC: NEGATIVE for changes in mood or affect    EXAM:   /62   Pulse 78   Temp 98.2  F (36.8  C) (Oral)   Ht 1.778 m (5' 10\")   Wt 81.6 kg (180 lb)   SpO2 97%   BMI 25.83 kg/m      GENERAL APPEARANCE: healthy, alert and no distress     EYES: EOMI,  PERRL     HENT: ear canals and TM's normal and nose and mouth without ulcers or lesions     NECK: no adenopathy, no asymmetry, masses, or scars and thyroid normal to palpation     RESP: lungs clear to auscultation - no rales, rhonchi or wheezes     CV: regular rates and rhythm, normal S1 S2, no S3 or S4 and no murmur, click or rub     ABDOMEN:  soft, nontender, no HSM or masses and bowel sounds normal     MS: extremities normal- no gross deformities noted, no evidence of inflammation in joints, FROM in all extremities.     SKIN: no suspicious lesions or rashes     NEURO: Normal strength and tone, sensory exam grossly normal, mentation intact and speech normal     PSYCH: mentation appears normal. and affect normal/bright     LYMPHATICS: No cervical adenopathy    DIAGNOSTICS:     EKG: appears normal, NSR, normal axis, normal intervals, no acute ST/T changes c/w ischemia, no LVH by voltage criteria, unchanged from previous tracings  Labs Resulted Today:   Results for orders placed or performed in visit on 02/20/19   Hemoglobin   Result Value Ref Range    Hemoglobin 14.0 13.3 - 17.7 g/dL       Recent Labs   Lab Test 01/23/19  0945 04/05/18  1119  08/28/14  0919  " 04/30/14  2130   HGB  --  14.2  --  14.7   < >  --    PLT  --   --   --  267  --  288    136   < > 141  --   --    POTASSIUM 3.8 3.9   < > 3.8  --   --    CR 0.89 0.77   < > 0.86  --  0.70    < > = values in this interval not displayed.        IMPRESSION:   Reason for surgery/procedure: partial tear of supraspinatus  Diagnosis/reason for consult: preoperative clearance with history of CAD, HTN, HLD    The proposed surgical procedure is considered INTERMEDIATE risk.    REVISED CARDIAC RISK INDEX1 risks: Class II (low risk - 0.9% complication rate)The patient has the following serious cardiovascular risks for perioperative complications such as (MI, PE, VFib and 3  AV Block):  No serious cardiac risks  INTERPRETATION: 1 risks: Class II (low risk - 0.9% complication rate)    The patient has the following additional risks for perioperative complications:  No identified additional risks      ICD-10-CM    1. Preop general physical exam Z01.818        RECOMMENDATIONS:     --Consult hospital rounder / IM to assist post-op medical management    Cardiovascular Risk  Performs 4 METs exercise without symptoms (Light housework (dusting, washing dishes), Climb a flight of stairs, Walk on level ground at 15 minutes per mile (4 miles/hour), Bicycling at 8.5 minutes per mile (7 miles/hour) and Shoveling) .       --Patient is to take all scheduled medications on the day of surgery EXCEPT for modifications listed below.    Anticoagulant or Antiplatelet Medication Use  ASPIRIN: Discontinue ASA 7-10 days prior to procedure to reduce bleeding risk.  It should be resumed post-operatively.        ACE Inhibitor or Angiotensin Receptor Blocker (ARB) Use  Ace inhibitor or Angiotensin Receptor Blocker (ARB) and should HOLD this medication for the 24 hours prior to surgery.      APPROVAL GIVEN to proceed with proposed procedure, without further diagnostic evaluation       Signed Electronically by: Gurmeet Urbina, DO    Copy of this  evaluation report is provided to requesting physician.    [facility] Preop Guidelines    Revised Cardiac Risk Index

## 2019-02-27 ENCOUNTER — TRANSFERRED RECORDS (OUTPATIENT)
Dept: HEALTH INFORMATION MANAGEMENT | Facility: CLINIC | Age: 69
End: 2019-02-27

## 2019-03-11 ENCOUNTER — TRANSFERRED RECORDS (OUTPATIENT)
Dept: HEALTH INFORMATION MANAGEMENT | Facility: CLINIC | Age: 69
End: 2019-03-11

## 2019-03-14 ENCOUNTER — THERAPY VISIT (OUTPATIENT)
Dept: PHYSICAL THERAPY | Facility: CLINIC | Age: 69
End: 2019-03-14
Payer: MEDICARE

## 2019-03-14 DIAGNOSIS — M75.102 TEAR OF LEFT ROTATOR CUFF: ICD-10-CM

## 2019-03-14 DIAGNOSIS — Z47.89 AFTERCARE FOLLOWING SURGERY OF THE MUSCULOSKELETAL SYSTEM: ICD-10-CM

## 2019-03-14 PROCEDURE — 97161 PT EVAL LOW COMPLEX 20 MIN: CPT | Mod: GP | Performed by: PHYSICAL THERAPIST

## 2019-03-14 PROCEDURE — 97110 THERAPEUTIC EXERCISES: CPT | Mod: GP | Performed by: PHYSICAL THERAPIST

## 2019-03-14 NOTE — LETTER
DEPARTMENT OF HEALTH AND HUMAN SERVICES  CENTERS FOR MEDICARE & MEDICAID SERVICES    PLAN/UPDATED PLAN OF PROGRESS FOR OUTPATIENT REHABILITATION    PATIENTS NAME:  Mynor Curry   : 1950    PROVIDER NUMBER:    [id number]    HICN:  [id number]    PROVIDER NAME: [place] [employer] [facility]    MEDICAL RECORD NUMBER: [id number]     START OF CARE DATE:  SOC Date: 19   TYPE:  PT    PRIMARY/TREATMENT DIAGNOSIS: (Pertinent Medical Diagnosis)     Tear of left rotator cuff  Aftercare following surgery of the musculoskeletal system    VISITS FROM START OF CARE:    1    [facility] Initial Evaluation  Subjective:  Pt presents to PT s/p left shoulder mini open RCR with arthroscopic DCE.  DOS= 19.  The history is provided by the patient.   Mynor Curry is a 68 year old male with a left shoulder condition.  Condition occurred with:  A fall.  Condition occurred: in the community.  This is a new condition     Patient reports pain:  Anterior and lateral.  Radiates to:  Upper arm.  Pain is described as stabbing and aching and is intermittent and reported as 5/10.  Associated symptoms:  Loss of motion/stiffness and loss of strength. Pain is the same all the time.  Symptoms are exacerbated by certain positions and relieved by bracing/immobilizing and ice.  Since onset symptoms are gradually improving.  Special tests:  MRI.      General health as reported by patient is good.                Barriers include:  None as reported by the patient.  Red flags:  None as reported by the patient.  Pertinent medical history includes:  High blood pressure and osteoarthritis.      Current medications:  Anti-inflammatory and high blood pressure medication (statins for CVD).  Current occupation is retired.    Objective:  Shoulder Evaluation:  ROM:  AROM:  not assessed  Flexion:  Right:  140  Abduction:  Right:  142  Extension/Internal Rotation:  Right:  L1              PATIENTS NAME:  Mynor Curry    : 1950        PROM:    Flexion:  Left:  115    Right: 146    Abduction:  Left:  118    Right:  150  Internal Rotation:  Right:  40  External Rotation:  Left:  52    Right:  90    Strength:  not assessed    Assessment/Plan:    Patient is a 68 year old male with left side shoulder complaints.    Patient has the following significant findings with corresponding treatment plan.                Diagnosis 1:  S/p left shoulder mini open RCR with DCE  Pain -  hot/cold therapy, education and home program  Decreased ROM/flexibility - manual therapy, therapeutic exercise and home program  Decreased strength - therapeutic exercise, therapeutic activities and home program    Therapy Evaluation Codes:   1) History comprised of:   Personal factors that impact the plan of care:      None.    Comorbidity factors that impact the plan of care are:      None.     Medications impacting care: None.  2) Examination of Body Systems comprised of:   Body structures and functions that impact the plan of care:      Shoulder.   Activity limitations that impact the plan of care are:      Bathing, Dressing and Lifting.  3) Clinical presentation characteristics are:   Stable/Uncomplicated.  4) Decision-Making    Low complexity using standardized patient assessment instrument and/or measureable assessment of functional outcome.  Cumulative Therapy Evaluation is: Low complexity.    Previous and current functional limitations:  (See Goal Flow Sheet for this information)    Short term and Long term goals: (See Goal Flow Sheet for this information)     Communication ability:  Patient appears to be able to clearly communicate and understand verbal and written communication and follow directions correctly.  Treatment Explanation - The following has been discussed with the patient:   RX ordered/plan of care  Anticipated outcomes  Possible risks and side effects  This patient would benefit from PT intervention to resume normal activities.   Rehab  "potential is good.      PATIENTS NAME:  Mynor Curry   : 1950          Frequency:  2 X week, once daily  Duration:  for 8 weeks    Discharge Plan:  Achieve all LTG.  Independent in home treatment program.  Reach maximal therapeutic benefit.                                  Caregiver Signature/Credentials _____________________________ Date ________       Treating Provider: Diego Hobson, PT   I have reviewed and certified the need for these services and plan of treatment while under my care.        PHYSICIAN'S SIGNATURE:   _________________________________________  Date___________   Lazaro Cobb MD    Certification period:  Beginning of Cert date period: 19 to  End of Cert period date: 19     Functional Level Progress Report: Please see attached \"Goal Flow sheet for Functional level.\"    ____X____ Continue Services or       ________ DC Services                Service dates: From  SOC Date: 19 date to present                         "

## 2019-03-14 NOTE — PROGRESS NOTES
[facility] Initial Evaluation  Subjective:                                       Pertinent medical history includes:  High blood pressure and osteoarthritis.      Current medications:  Anti-inflammatory and high blood pressure medication (statins for CVD).  Current occupation is retired.                                    Objective:  System    Physical Exam    General     ROS    Assessment/Plan:

## 2019-03-14 NOTE — PROGRESS NOTES
[facility] Initial Evaluation  Subjective:  Pt presents to PT s/p left shoulder mini open RCR with arthroscopic DCE.  DOS= 2/28/19.        The history is provided by the patient.   Mynor Curry is a 68 year old male with a left shoulder condition.  Condition occurred with:  A fall.  Condition occurred: in the community.  This is a new condition     Patient reports pain:  Anterior and lateral.  Radiates to:  Upper arm.  Pain is described as stabbing and aching and is intermittent and reported as 5/10.  Associated symptoms:  Loss of motion/stiffness and loss of strength. Pain is the same all the time.  Symptoms are exacerbated by certain positions and relieved by bracing/immobilizing and ice.  Since onset symptoms are gradually improving.  Special tests:  MRI.      General health as reported by patient is good.                  Barriers include:  None as reported by the patient.    Red flags:  None as reported by the patient.                        Objective:  System                   Shoulder Evaluation:  ROM:  AROM:  not assessed  Flexion:  Right:  140    Abduction:  Right:  142                  Extension/Internal Rotation:  Right:  L1    PROM:    Flexion:  Left:  115    Right: 146      Abduction:  Left:  118    Right:  150    Internal Rotation:  Right:  40  External Rotation:  Left:  52    Right:  90                    Strength:  not assessed                                                               General     ROS    Assessment/Plan:    Patient is a 68 year old male with left side shoulder complaints.    Patient has the following significant findings with corresponding treatment plan.                Diagnosis 1:  S/p left shoulder mini open RCR with DCE  Pain -  hot/cold therapy, education and home program  Decreased ROM/flexibility - manual therapy, therapeutic exercise and home program  Decreased strength - therapeutic exercise, therapeutic activities and home program    Therapy  Evaluation Codes:   1) History comprised of:   Personal factors that impact the plan of care:      None.    Comorbidity factors that impact the plan of care are:      None.     Medications impacting care: None.  2) Examination of Body Systems comprised of:   Body structures and functions that impact the plan of care:      Shoulder.   Activity limitations that impact the plan of care are:      Bathing, Dressing and Lifting.  3) Clinical presentation characteristics are:   Stable/Uncomplicated.  4) Decision-Making    Low complexity using standardized patient assessment instrument and/or measureable assessment of functional outcome.  Cumulative Therapy Evaluation is: Low complexity.    Previous and current functional limitations:  (See Goal Flow Sheet for this information)    Short term and Long term goals: (See Goal Flow Sheet for this information)     Communication ability:  Patient appears to be able to clearly communicate and understand verbal and written communication and follow directions correctly.  Treatment Explanation - The following has been discussed with the patient:   RX ordered/plan of care  Anticipated outcomes  Possible risks and side effects  This patient would benefit from PT intervention to resume normal activities.   Rehab potential is good.    Frequency:  2 X week, once daily  Duration:  for 4 weeks tapering to 1 X a week over up to 8 weeks  Discharge Plan:  Achieve all LTG.  Independent in home treatment program.  Reach maximal therapeutic benefit.    Please refer to the daily flowsheet for treatment today, total treatment time and time spent performing 1:1 timed codes.

## 2019-03-18 ENCOUNTER — THERAPY VISIT (OUTPATIENT)
Dept: PHYSICAL THERAPY | Facility: CLINIC | Age: 69
End: 2019-03-18
Payer: MEDICARE

## 2019-03-18 DIAGNOSIS — M75.102 TEAR OF LEFT ROTATOR CUFF: ICD-10-CM

## 2019-03-18 DIAGNOSIS — Z47.89 AFTERCARE FOLLOWING SURGERY OF THE MUSCULOSKELETAL SYSTEM: ICD-10-CM

## 2019-03-18 PROCEDURE — 97110 THERAPEUTIC EXERCISES: CPT | Mod: GP

## 2019-03-21 ENCOUNTER — THERAPY VISIT (OUTPATIENT)
Dept: PHYSICAL THERAPY | Facility: CLINIC | Age: 69
End: 2019-03-21
Payer: MEDICARE

## 2019-03-21 DIAGNOSIS — M75.102 TEAR OF LEFT ROTATOR CUFF: ICD-10-CM

## 2019-03-21 DIAGNOSIS — Z47.89 AFTERCARE FOLLOWING SURGERY OF THE MUSCULOSKELETAL SYSTEM: ICD-10-CM

## 2019-03-21 PROCEDURE — 97110 THERAPEUTIC EXERCISES: CPT | Mod: GP

## 2019-03-26 ENCOUNTER — THERAPY VISIT (OUTPATIENT)
Dept: PHYSICAL THERAPY | Facility: CLINIC | Age: 69
End: 2019-03-26
Payer: MEDICARE

## 2019-03-26 DIAGNOSIS — Z47.89 AFTERCARE FOLLOWING SURGERY OF THE MUSCULOSKELETAL SYSTEM: ICD-10-CM

## 2019-03-26 DIAGNOSIS — M75.102 TEAR OF LEFT ROTATOR CUFF: ICD-10-CM

## 2019-03-26 PROCEDURE — 97110 THERAPEUTIC EXERCISES: CPT | Mod: GP | Performed by: PHYSICAL THERAPIST

## 2019-03-28 ENCOUNTER — THERAPY VISIT (OUTPATIENT)
Dept: PHYSICAL THERAPY | Facility: CLINIC | Age: 69
End: 2019-03-28
Payer: MEDICARE

## 2019-03-28 DIAGNOSIS — M75.102 TEAR OF LEFT ROTATOR CUFF: ICD-10-CM

## 2019-03-28 DIAGNOSIS — Z47.89 AFTERCARE FOLLOWING SURGERY OF THE MUSCULOSKELETAL SYSTEM: ICD-10-CM

## 2019-03-28 PROCEDURE — 97110 THERAPEUTIC EXERCISES: CPT | Mod: GP | Performed by: PHYSICAL THERAPIST

## 2019-04-04 ENCOUNTER — THERAPY VISIT (OUTPATIENT)
Dept: PHYSICAL THERAPY | Facility: CLINIC | Age: 69
End: 2019-04-04
Payer: MEDICARE

## 2019-04-04 DIAGNOSIS — Z47.89 AFTERCARE FOLLOWING SURGERY OF THE MUSCULOSKELETAL SYSTEM: ICD-10-CM

## 2019-04-04 DIAGNOSIS — M75.102 TEAR OF LEFT ROTATOR CUFF: ICD-10-CM

## 2019-04-04 PROCEDURE — 97110 THERAPEUTIC EXERCISES: CPT | Mod: GP

## 2019-04-10 ENCOUNTER — TRANSFERRED RECORDS (OUTPATIENT)
Dept: HEALTH INFORMATION MANAGEMENT | Facility: CLINIC | Age: 69
End: 2019-04-10

## 2019-04-11 ENCOUNTER — THERAPY VISIT (OUTPATIENT)
Dept: PHYSICAL THERAPY | Facility: CLINIC | Age: 69
End: 2019-04-11
Payer: MEDICARE

## 2019-04-11 DIAGNOSIS — Z47.89 AFTERCARE FOLLOWING SURGERY OF THE MUSCULOSKELETAL SYSTEM: ICD-10-CM

## 2019-04-11 DIAGNOSIS — M75.102 TEAR OF LEFT ROTATOR CUFF: ICD-10-CM

## 2019-04-11 PROCEDURE — 97110 THERAPEUTIC EXERCISES: CPT | Mod: GP | Performed by: PHYSICAL THERAPIST

## 2019-04-17 DIAGNOSIS — A60.02 HERPES SIMPLEX INFECTION OF OTHER SITE OF MALE GENITAL ORGAN: ICD-10-CM

## 2019-04-17 RX ORDER — VALACYCLOVIR HYDROCHLORIDE 500 MG/1
TABLET, FILM COATED ORAL
Qty: 30 TABLET | Refills: 3 | Status: SHIPPED | OUTPATIENT
Start: 2019-04-17 | End: 2019-11-11

## 2019-04-17 NOTE — TELEPHONE ENCOUNTER
Reason for Call:  Other prescription    Detailed comments: Pt called this morning and would like a refill on his Valtrex. Please refill this and give pt a call once this is done. Thank you.    Phone Number Patient can be reached at: Home number on file [phone] (home)    Best Time:     Can we leave a detailed message on this number? YES    Call taken on 4/17/2019 at 10:05 AM by Marivel Dunlap

## 2019-04-17 NOTE — TELEPHONE ENCOUNTER
"Requested Prescriptions   Pending Prescriptions Disp Refills     valACYclovir (VALTREX) 500 MG tablet  Last Written Prescription Date:  9/25/2018  Last Fill Quantity: 30 tablet,  # refills: 3   Last office visit: 2/20/2019 with prescribing provider:  Carlitos     Future Office Visit:   Next 5 appointments (look out 90 days)    Jun 04, 2019 10:15 AM CDT  Return Visit with Alfonso Ng MD  [facility] ([facility] [id number] [place] [id number] [phone]            30 tablet 3     Sig: One tablet by mouth twice daily for three days for each outbreak       Antivirals for Herpes Protocol Passed - 4/17/2019 10:06 AM        Passed - Patient is age 12 or older        Passed - Recent (12 mo) or future (30 days) visit within the authorizing provider's specialty     Patient had office visit in the last 12 months or has a visit in the next 30 days with authorizing provider or within the authorizing provider's specialty.  See \"Patient Info\" tab in inbasket, or \"Choose Columns\" in Meds & Orders section of the refill encounter.              Passed - Medication is active on med list        Passed - Normal serum creatinine on file in past 12 months     Recent Labs   Lab Test 01/23/19  0945   CR 0.89             "

## 2019-04-18 ENCOUNTER — THERAPY VISIT (OUTPATIENT)
Dept: PHYSICAL THERAPY | Facility: CLINIC | Age: 69
End: 2019-04-18
Payer: MEDICARE

## 2019-04-18 DIAGNOSIS — Z47.89 AFTERCARE FOLLOWING SURGERY OF THE MUSCULOSKELETAL SYSTEM: ICD-10-CM

## 2019-04-18 DIAGNOSIS — M75.102 TEAR OF LEFT ROTATOR CUFF: ICD-10-CM

## 2019-04-18 PROCEDURE — 97110 THERAPEUTIC EXERCISES: CPT | Mod: GP

## 2019-04-25 ENCOUNTER — THERAPY VISIT (OUTPATIENT)
Dept: PHYSICAL THERAPY | Facility: CLINIC | Age: 69
End: 2019-04-25
Payer: MEDICARE

## 2019-04-25 DIAGNOSIS — Z47.89 AFTERCARE FOLLOWING SURGERY OF THE MUSCULOSKELETAL SYSTEM: ICD-10-CM

## 2019-04-25 DIAGNOSIS — M75.102 TEAR OF LEFT ROTATOR CUFF: ICD-10-CM

## 2019-04-25 PROCEDURE — 97110 THERAPEUTIC EXERCISES: CPT | Mod: GP | Performed by: PHYSICAL THERAPIST

## 2019-05-02 ENCOUNTER — THERAPY VISIT (OUTPATIENT)
Dept: PHYSICAL THERAPY | Facility: CLINIC | Age: 69
End: 2019-05-02
Payer: MEDICARE

## 2019-05-02 DIAGNOSIS — Z47.89 AFTERCARE FOLLOWING SURGERY OF THE MUSCULOSKELETAL SYSTEM: ICD-10-CM

## 2019-05-02 DIAGNOSIS — M75.102 TEAR OF LEFT ROTATOR CUFF: ICD-10-CM

## 2019-05-02 PROCEDURE — 97110 THERAPEUTIC EXERCISES: CPT | Mod: GP | Performed by: PHYSICAL THERAPIST

## 2019-05-29 ENCOUNTER — TRANSFERRED RECORDS (OUTPATIENT)
Dept: HEALTH INFORMATION MANAGEMENT | Facility: CLINIC | Age: 69
End: 2019-05-29

## 2019-05-30 ENCOUNTER — THERAPY VISIT (OUTPATIENT)
Dept: PHYSICAL THERAPY | Facility: CLINIC | Age: 69
End: 2019-05-30
Payer: MEDICARE

## 2019-05-30 DIAGNOSIS — M75.102 TEAR OF LEFT ROTATOR CUFF: ICD-10-CM

## 2019-05-30 DIAGNOSIS — Z47.89 AFTERCARE FOLLOWING SURGERY OF THE MUSCULOSKELETAL SYSTEM: ICD-10-CM

## 2019-05-30 PROCEDURE — 97110 THERAPEUTIC EXERCISES: CPT | Mod: GP | Performed by: PHYSICAL THERAPIST

## 2019-05-31 DIAGNOSIS — I10 ESSENTIAL HYPERTENSION: Primary | ICD-10-CM

## 2019-06-04 ENCOUNTER — OFFICE VISIT (OUTPATIENT)
Dept: CARDIOLOGY | Facility: CLINIC | Age: 69
End: 2019-06-04
Payer: MEDICARE

## 2019-06-04 VITALS
HEART RATE: 57 BPM | WEIGHT: 168 LBS | HEIGHT: 70 IN | DIASTOLIC BLOOD PRESSURE: 70 MMHG | SYSTOLIC BLOOD PRESSURE: 118 MMHG | BODY MASS INDEX: 24.05 KG/M2

## 2019-06-04 DIAGNOSIS — I10 ESSENTIAL HYPERTENSION: ICD-10-CM

## 2019-06-04 DIAGNOSIS — E78.00 PURE HYPERCHOLESTEROLEMIA: ICD-10-CM

## 2019-06-04 DIAGNOSIS — I25.810 CORONARY ARTERY DISEASE INVOLVING CORONARY BYPASS GRAFT OF NATIVE HEART WITHOUT ANGINA PECTORIS: ICD-10-CM

## 2019-06-04 DIAGNOSIS — I10 ESSENTIAL HYPERTENSION: Primary | ICD-10-CM

## 2019-06-04 LAB
ALT SERPL W P-5'-P-CCNC: 39 U/L (ref 0–70)
ANION GAP SERPL CALCULATED.3IONS-SCNC: 4 MMOL/L (ref 3–14)
BUN SERPL-MCNC: 22 MG/DL (ref 7–30)
CALCIUM SERPL-MCNC: 9.1 MG/DL (ref 8.5–10.1)
CHLORIDE SERPL-SCNC: 105 MMOL/L (ref 94–109)
CHOLEST SERPL-MCNC: 116 MG/DL
CO2 SERPL-SCNC: 29 MMOL/L (ref 20–32)
CREAT SERPL-MCNC: 0.9 MG/DL (ref 0.66–1.25)
GFR SERPL CREATININE-BSD FRML MDRD: 85 ML/MIN/{1.73_M2}
GLUCOSE SERPL-MCNC: 89 MG/DL (ref 70–99)
HDLC SERPL-MCNC: 60 MG/DL
LDLC SERPL CALC-MCNC: 42 MG/DL
NONHDLC SERPL-MCNC: 56 MG/DL
POTASSIUM SERPL-SCNC: 4.3 MMOL/L (ref 3.4–5.3)
SODIUM SERPL-SCNC: 138 MMOL/L (ref 133–144)
TRIGL SERPL-MCNC: 68 MG/DL

## 2019-06-04 PROCEDURE — 84460 ALANINE AMINO (ALT) (SGPT): CPT | Performed by: INTERNAL MEDICINE

## 2019-06-04 PROCEDURE — 80061 LIPID PANEL: CPT | Performed by: INTERNAL MEDICINE

## 2019-06-04 PROCEDURE — 99213 OFFICE O/P EST LOW 20 MIN: CPT | Performed by: INTERNAL MEDICINE

## 2019-06-04 PROCEDURE — 36415 COLL VENOUS BLD VENIPUNCTURE: CPT | Performed by: INTERNAL MEDICINE

## 2019-06-04 PROCEDURE — 80048 BASIC METABOLIC PNL TOTAL CA: CPT | Performed by: INTERNAL MEDICINE

## 2019-06-04 ASSESSMENT — MIFFLIN-ST. JEOR: SCORE: 1528.29

## 2019-06-04 NOTE — PROGRESS NOTES
HPI and Plan:   See dictation    Orders Placed This Encounter   Procedures     Basic metabolic panel     Lipid Profile     ALT     Follow-Up with Cardiologist       No orders of the defined types were placed in this encounter.      There are no discontinued medications.      Encounter Diagnoses   Name Primary?     Essential hypertension Yes     Coronary artery disease involving coronary bypass graft of native heart without angina pectoris      Pure hypercholesterolemia        CURRENT MEDICATIONS:  Current Outpatient Medications   Medication Sig Dispense Refill     aspirin EC 81 MG tablet Take 1 tablet (81 mg) by mouth daily       Cholecalciferol (VITAMIN D) 2000 UNITS CAPS Take by mouth daily       gabapentin (NEURONTIN) 300 MG capsule Take 300 mg by mouth 2 times daily        Ibuprofen (ADVIL PO) Take 400 mg by mouth every 4 hours as needed        losartan-hydrochlorothiazide (HYZAAR) 100-12.5 MG per tablet Take 1 tablet by mouth daily 90 tablet 2     Multiple Vitamins-Minerals (DAILY MULTIVITAMIN PO) Take by mouth daily       omeprazole (PRILOSEC) 20 MG DR capsule TAKE 1 CAPSULE (20 MG) BY MOUTH DAILY 30 capsule 1     rosuvastatin (CRESTOR) 20 MG tablet TAKE 1 TABLET EVERY DAY 90 tablet 3     sildenafil (VIAGRA) 100 MG tablet Take 0.5-1 tablets ( mg) by mouth daily as needed 6 tablet 11     valACYclovir (VALTREX) 500 MG tablet One tablet by mouth twice daily for three days for each outbreak 30 tablet 3     fluticasone (FLONASE) 50 MCG/ACT spray Spray 1-2 sprays into both nostrils daily (Patient not taking: Reported on 6/4/2019) 1 Bottle 0     multivitamin  with lutein (OCUVITE WITH LTEIN) CAPS per capsule Take 1 capsule by mouth daily         ALLERGIES     Allergies   Allergen Reactions     Augmentin      vomiting       PAST MEDICAL HISTORY:  Past Medical History:   Diagnosis Date     Arthritis      Coronary artery disease     Mild     Erectile dysfunction due to arterial insufficiency 8/31/2015      Hyperlipidemia LDL goal <70 2/4/2017     Hypertension goal BP (blood pressure) < 140/90 7/17/2013    Since 2006      Syncope     vasovagal       PAST SURGICAL HISTORY:  Past Surgical History:   Procedure Laterality Date     ARTHROPLASTY HIP ANTERIOR  4/30/2014    Procedure: ARTHROPLASTY HIP ANTERIOR;  Surgeon: Ayaz Butcher MD;  Location: [facility]     ARTHROSCOPY KNEE WITH MEDIAL MENISCECTOMY  2001    left     ARTHROSCOPY SHOULDER  1999    right     COLONOSCOPY       HEMILAMINECTOMY, DISCECTOMY LUMBAR ONE LEVEL, COMBINED  1989     HERNIA REPAIR, INGUINAL RT/LT  2011    bilateral     ORTHOPEDIC SURGERY         FAMILY HISTORY:  Family History   Problem Relation Age of Onset     Arthritis Mother      Hypertension Mother      Arthritis Father      Hypertension Father      Arrhythmia Father         atrial fib     C.A.D. No family hx of      Diabetes No family hx of      Prostate Cancer No family hx of      Cancer - colorectal No family hx of         mother with some polyps though       SOCIAL HISTORY:  Social History     Socioeconomic History     Marital status:      Spouse name: Melanie     Number of children: 3     Years of education: None     Highest education level: None   Occupational History     Occupation:      Employer: RETIRED   Social Needs     Financial resource strain: None     Food insecurity:     Worry: None     Inability: None     Transportation needs:     Medical: None     Non-medical: None   Tobacco Use     Smoking status: Never Smoker     Smokeless tobacco: Never Used   Substance and Sexual Activity     Alcohol use: Yes     Alcohol/week: 0.0 oz     Comment: 2-3 drinks per week     Drug use: No     Sexual activity: Yes     Partners: Female     Birth control/protection: Condom   Lifestyle     Physical activity:     Days per week: None     Minutes per session: None     Stress: None   Relationships     Social connections:     Talks on phone: None     Gets together: None      "Attends Worship service: None     Active member of club or organization: None     Attends meetings of clubs or organizations: None     Relationship status: None     Intimate partner violence:     Fear of current or ex partner: None     Emotionally abused: None     Physically abused: None     Forced sexual activity: None   Other Topics Concern     Parent/sibling w/ CABG, MI or angioplasty before 65F 55M? No      Service No     Blood Transfusions No     Caffeine Concern No     Comment: Hot tea daily - 2 cups     Occupational Exposure No     Hobby Hazards No     Sleep Concern No     Stress Concern No     Weight Concern No     Special Diet No     Back Care No     Exercise Yes     Comment: biking- 2-3 days per week  on average     Bike Helmet Yes     Seat Belt Yes     Self-Exams Yes   Social History Narrative     None       Review of Systems:  Skin:  Negative for       Eyes:  Positive for glasses    ENT:  Negative for      Respiratory:  Negative for       Cardiovascular:  Negative      Gastroenterology: Positive for heartburn    Genitourinary:  Positive for erectile dysfunction;prostate problem;urinary frequency;decreased urinary stream;nocturia    Musculoskeletal:  Positive for arthritis    Neurologic:  Negative for      Psychiatric:  Negative for      Heme/Lymph/Imm:  Negative for      Endocrine:  Negative for        Physical Exam:  Vitals: /70   Pulse 57   Ht 1.778 m (5' 10\")   Wt 76.2 kg (168 lb)   BMI 24.11 kg/m      Constitutional:  cooperative, alert and oriented, well developed, well nourished, in no acute distress thin      Skin:  warm and dry to the touch, no apparent skin lesions or masses noted          Head:  normocephalic, no masses or lesions        Eyes:  pupils equal and round, conjunctivae and lids unremarkable, sclera white, no xanthalasma, EOMS intact, no nystagmus        Lymph:No Cervical lymphadenopathy present     ENT:  no pallor or cyanosis, dentition good;no pallor or cyanosis "        Neck:  carotid pulses are full and equal bilaterally, JVP normal, no carotid bruit        Respiratory:  normal breath sounds, clear to auscultation, normal A-P diameter, normal symmetry, normal respiratory excursion, no use of accessory muscles         Cardiac: regular rhythm, normal S1/S2, no S3 or S4, apical impulse not displaced, no murmurs, gallops or rubs                pulses full and equal, no bruits auscultated                                        GI:           Extremities and Muscular Skeletal:  no deformities, clubbing, cyanosis, erythema observed;no edema              Neurological:  no gross motor deficits;affect appropriate        Psych:  Alert and Oriented x 3        CC  Doug Seo Jr., MD  [address] [place] [address]

## 2019-06-04 NOTE — LETTER
6/4/2019    Doug Seo Jr, MD  [address] [place] [address]    RE: Mynor Curry       Dear Colleague,    I had the pleasure of seeing Mynor Curry in the [facility].    HPI and Plan:   See dictation    Orders Placed This Encounter   Procedures     Basic metabolic panel     Lipid Profile     ALT     Follow-Up with Cardiologist       No orders of the defined types were placed in this encounter.      There are no discontinued medications.      Encounter Diagnoses   Name Primary?     Essential hypertension Yes     Coronary artery disease involving coronary bypass graft of native heart without angina pectoris      Pure hypercholesterolemia        CURRENT MEDICATIONS:  Current Outpatient Medications   Medication Sig Dispense Refill     aspirin EC 81 MG tablet Take 1 tablet (81 mg) by mouth daily       Cholecalciferol (VITAMIN D) 2000 UNITS CAPS Take by mouth daily       gabapentin (NEURONTIN) 300 MG capsule Take 300 mg by mouth 2 times daily        Ibuprofen (ADVIL PO) Take 400 mg by mouth every 4 hours as needed        losartan-hydrochlorothiazide (HYZAAR) 100-12.5 MG per tablet Take 1 tablet by mouth daily 90 tablet 2     Multiple Vitamins-Minerals (DAILY MULTIVITAMIN PO) Take by mouth daily       omeprazole (PRILOSEC) 20 MG DR capsule TAKE 1 CAPSULE (20 MG) BY MOUTH DAILY 30 capsule 1     rosuvastatin (CRESTOR) 20 MG tablet TAKE 1 TABLET EVERY DAY 90 tablet 3     sildenafil (VIAGRA) 100 MG tablet Take 0.5-1 tablets ( mg) by mouth daily as needed 6 tablet 11     valACYclovir (VALTREX) 500 MG tablet One tablet by mouth twice daily for three days for each outbreak 30 tablet 3     fluticasone (FLONASE) 50 MCG/ACT spray Spray 1-2 sprays into both nostrils daily (Patient not taking: Reported on 6/4/2019) 1 Bottle 0     multivitamin  with lutein (OCUVITE WITH LTEIN) CAPS per capsule Take 1 capsule by mouth daily         ALLERGIES     Allergies   Allergen Reactions      Augmentin      vomiting       PAST MEDICAL HISTORY:  Past Medical History:   Diagnosis Date     Arthritis      Coronary artery disease     Mild     Erectile dysfunction due to arterial insufficiency 8/31/2015     Hyperlipidemia LDL goal <70 2/4/2017     Hypertension goal BP (blood pressure) < 140/90 7/17/2013    Since 2006      Syncope     vasovagal       PAST SURGICAL HISTORY:  Past Surgical History:   Procedure Laterality Date     ARTHROPLASTY HIP ANTERIOR  4/30/2014    Procedure: ARTHROPLASTY HIP ANTERIOR;  Surgeon: Ayaz Butcher MD;  Location: [facility]     ARTHROSCOPY KNEE WITH MEDIAL MENISCECTOMY  2001    left     ARTHROSCOPY SHOULDER  1999    right     COLONOSCOPY       HEMILAMINECTOMY, DISCECTOMY LUMBAR ONE LEVEL, COMBINED  1989     HERNIA REPAIR, INGUINAL RT/LT  2011    bilateral     ORTHOPEDIC SURGERY         FAMILY HISTORY:  Family History   Problem Relation Age of Onset     Arthritis Mother      Hypertension Mother      Arthritis Father      Hypertension Father      Arrhythmia Father         atrial fib     C.A.D. No family hx of      Diabetes No family hx of      Prostate Cancer No family hx of      Cancer - colorectal No family hx of         mother with some polyps though       SOCIAL HISTORY:  Social History     Socioeconomic History     Marital status:      Spouse name: Melanie     Number of children: 3     Years of education: None     Highest education level: None   Occupational History     Occupation:      Employer: RETIRED   Social Needs     Financial resource strain: None     Food insecurity:     Worry: None     Inability: None     Transportation needs:     Medical: None     Non-medical: None   Tobacco Use     Smoking status: Never Smoker     Smokeless tobacco: Never Used   Substance and Sexual Activity     Alcohol use: Yes     Alcohol/week: 0.0 oz     Comment: 2-3 drinks per week     Drug use: No     Sexual activity: Yes     Partners: Female     Birth  "control/protection: Condom   Lifestyle     Physical activity:     Days per week: None     Minutes per session: None     Stress: None   Relationships     Social connections:     Talks on phone: None     Gets together: None     Attends [employer] service: None     Active member of club or organization: None     Attends meetings of clubs or organizations: None     Relationship status: None     Intimate partner violence:     Fear of current or ex partner: None     Emotionally abused: None     Physically abused: None     Forced sexual activity: None   Other Topics Concern     Parent/sibling w/ CABG, MI or angioplasty before 65F 55M? No      Service No     Blood Transfusions No     Caffeine Concern No     Comment: Hot tea daily - 2 cups     Occupational Exposure No     Hobby Hazards No     Sleep Concern No     Stress Concern No     Weight Concern No     Special Diet No     Back Care No     Exercise Yes     Comment: biking- 2-3 days per week  on average     Bike Helmet Yes     Seat Belt Yes     Self-Exams Yes   Social History Narrative     None       Review of Systems:  Skin:  Negative for       Eyes:  Positive for glasses    ENT:  Negative for      Respiratory:  Negative for       Cardiovascular:  Negative      Gastroenterology: Positive for heartburn    Genitourinary:  Positive for erectile dysfunction;prostate problem;urinary frequency;decreased urinary stream;nocturia    Musculoskeletal:  Positive for arthritis    Neurologic:  Negative for      Psychiatric:  Negative for      Heme/Lymph/Imm:  Negative for      Endocrine:  Negative for        Physical Exam:  Vitals: /70   Pulse 57   Ht 1.778 m (5' 10\")   Wt 76.2 kg (168 lb)   BMI 24.11 kg/m       Constitutional:  cooperative, alert and oriented, well developed, well nourished, in no acute distress thin      Skin:  warm and dry to the touch, no apparent skin lesions or masses noted          Head:  normocephalic, no masses or lesions        Eyes:  pupils " equal and round, conjunctivae and lids unremarkable, sclera white, no xanthalasma, EOMS intact, no nystagmus        Lymph:No Cervical lymphadenopathy present     ENT:  no pallor or cyanosis, dentition good;no pallor or cyanosis        Neck:  carotid pulses are full and equal bilaterally, JVP normal, no carotid bruit        Respiratory:  normal breath sounds, clear to auscultation, normal A-P diameter, normal symmetry, normal respiratory excursion, no use of accessory muscles         Cardiac: regular rhythm, normal S1/S2, no S3 or S4, apical impulse not displaced, no murmurs, gallops or rubs                pulses full and equal, no bruits auscultated                                        GI:           Extremities and Muscular Skeletal:  no deformities, clubbing, cyanosis, erythema observed;no edema              Neurological:  no gross motor deficits;affect appropriate        Psych:  Alert and Oriented x 3        CC  Doug Seo Jr., MD  [address] [place] [address]                Thank you for allowing me to participate in the care of your patient.      Sincerely,     Alfonso Ng MD, MD     [facility]    cc:   Doug Seo Jr., MD  [address] [place] [address]

## 2019-06-04 NOTE — PROGRESS NOTES
Service Date: 06/04/2019      HISTORY OF PRESENT ILLNESS:  It was pleasure to see your patient, Mynor Curry, in followup.  This is a patient with a past history of vasovagal syncope, but also has coronary artery disease based upon a coronary a CT coronary angiogram.  The CT coronary angiogram showed the patient had mild proximal LAD disease, mild to moderate LAD disease, trivial circumflex disease and mild right coronary artery disease.  The CT coronary angiogram was performed after a false-positive nuclear scan which was performed in 05/2015 where it was felt there was a small to moderate area of mild ischemia in the anterior and anteroseptal walls and also a small area of ischemia in the basal inferior wall.  All of this turned out to be a false-positive based upon the CT angiogram which was performed a week later.        The patient did have an MRI scan performed 11/09, which showed no infiltration, scar or wall motion abnormalities with an ejection fraction of 56%.  This was performed because an echocardiogram on 09/19 suggested mild to moderate basal inferior wall hypokinesis.  Again, this turned out to be a false positive on the MRI.      Since I last saw him, he has done well.  He has no chest pains, chest pressure or shortness of breath.  He did have shoulder surgery performed, which had to be redone and he is slowly getting back to exercising and wants to get back on his bicycle again.  He is quite an avid cyclist.  His shoulder injury actually occurred during trip in the [facility] area in [facility].        His lipid profile today was excellent with an LDL of 42, HDL of 60 and triglycerides of 68.  His total cholesterol is 116.  His liver function tests were normal with an ALT of 39.  Basic metabolic profile was also normal with a BUN of 22, creatinine 0.9 and a GFR of 85.  His sodium is 138, potassium is 4.3.      Blood pressure was excellent today at 118/70.      IMPRESSION:   1.  Coronary artery disease.  The  patient is asymptomatic with respect to coronary artery disease with no symptoms suggestive of angina pectoris.   2.  Essential hypertension.  His blood pressure is well controlled.   3.  Excellent lipid profile, well within secondary prevention guidelines and with no evidence of hepatic dysfunction.      PLAN:  I will continue the patient on his present medications.  I will see him back again in 1 year's time.      As always, he has been told to contact us if he has any questions or any concerns.      cc:      Doug Seo MD    [address] [address]         HERMINIA GALLAGHER MD, [facility]             D: 2019   T: 2019   MT: MEIR      Name:     BRINA PALACIOS   MRN:      [id number]        Account:      [id number]   :      1950           Service Date: 2019      Document: [id number]

## 2019-06-04 NOTE — LETTER
6/4/2019      Doug Seo Jr, MD  [address] [place] [address]      RE: Mynor Curry       Dear Colleague,    I had the pleasure of seeing Mynor Curry in the [facility].    Service Date: 06/04/2019      HISTORY OF PRESENT ILLNESS:  It was pleasure to see your patient, Mynor Curry, in followup.  This is a patient with a past history of vasovagal syncope, but also has coronary artery disease based upon a coronary a CT coronary angiogram.  The CT coronary angiogram showed the patient had mild proximal LAD disease, mild to moderate LAD disease, trivial circumflex disease and mild right coronary artery disease.  The CT coronary angiogram was performed after a false-positive nuclear scan which was performed in 05/2015 where it was felt there was a small to moderate area of mild ischemia in the anterior and anteroseptal walls and also a small area of ischemia in the basal inferior wall.  All of this turned out to be a false-positive based upon the CT angiogram which was performed a week later.        The patient did have an MRI scan performed 11/09, which showed no infiltration, scar or wall motion abnormalities with an ejection fraction of 56%.  This was performed because an echocardiogram on 09/19 suggested mild to moderate basal inferior wall hypokinesis.  Again, this turned out to be a false positive on the MRI.      Since I last saw him, he has done well.  He has no chest pains, chest pressure or shortness of breath.  He did have shoulder surgery performed, which had to be redone and he is slowly getting back to exercising and wants to get back on his bicycle again.  He is quite an avid cyclist.  His shoulder injury actually occurred during trip in the [facility] area in [facility].        His lipid profile today was excellent with an LDL of 42, HDL of 60 and triglycerides of 68.  His total cholesterol is 116.  His liver function tests were normal with an ALT of 39.  Basic metabolic  profile was also normal with a BUN of 22, creatinine 0.9 and a GFR of 85.  His sodium is 138, potassium is 4.3.      Blood pressure was excellent today at 118/70.      IMPRESSION:   1.  Coronary artery disease.  The patient is asymptomatic with respect to coronary artery disease with no symptoms suggestive of angina pectoris.   2.  Essential hypertension.  His blood pressure is well controlled.   3.  Excellent lipid profile, well within secondary prevention guidelines and with no evidence of hepatic dysfunction.      PLAN:  I will continue the patient on his present medications.  I will see him back again in 1 year's time.      As always, he has been told to contact us if he has any questions or any concerns.      cc:      Doug Soe MD    [facility]   [address] [place] [address]         HERMINIA GALLAGHER MD, [facility]             D: 2019   T: 2019   MT: MEIR      Name:     BRINA PALACIOS   MRN:      [id number]        Account:      [id number]   :      1950           Service Date: 2019      Document: [id number]         Outpatient Encounter Medications as of 2019   Medication Sig Dispense Refill     aspirin EC 81 MG tablet Take 1 tablet (81 mg) by mouth daily       Cholecalciferol (VITAMIN D) 2000 UNITS CAPS Take by mouth daily       gabapentin (NEURONTIN) 300 MG capsule Take 300 mg by mouth 2 times daily        Ibuprofen (ADVIL PO) Take 400 mg by mouth every 4 hours as needed        Multiple Vitamins-Minerals (DAILY MULTIVITAMIN PO) Take by mouth daily       omeprazole (PRILOSEC) 20 MG DR capsule TAKE 1 CAPSULE (20 MG) BY MOUTH DAILY 30 capsule 1     rosuvastatin (CRESTOR) 20 MG tablet TAKE 1 TABLET EVERY DAY 90 tablet 3     sildenafil (VIAGRA) 100 MG tablet Take 0.5-1 tablets ( mg) by mouth daily as needed 6 tablet 11     valACYclovir (VALTREX) 500 MG tablet One tablet by mouth twice daily for three days for each outbreak 30 tablet 3     [DISCONTINUED]  losartan-hydrochlorothiazide (HYZAAR) 100-12.5 MG per tablet Take 1 tablet by mouth daily 90 tablet 2     fluticasone (FLONASE) 50 MCG/ACT spray Spray 1-2 sprays into both nostrils daily (Patient not taking: Reported on 6/4/2019) 1 Bottle 0     multivitamin  with lutein (OCUVITE WITH LTEIN) CAPS per capsule Take 1 capsule by mouth daily       No facility-administered encounter medications on file as of 6/4/2019.        Again, thank you for allowing me to participate in the care of your patient.      Sincerely,    Alfonso Ng MD, MD     [facility]

## 2019-06-05 DIAGNOSIS — I10 ESSENTIAL HYPERTENSION WITH GOAL BLOOD PRESSURE LESS THAN 140/90: ICD-10-CM

## 2019-06-05 RX ORDER — LOSARTAN POTASSIUM AND HYDROCHLOROTHIAZIDE 12.5; 1 MG/1; MG/1
1 TABLET ORAL DAILY
Qty: 90 TABLET | Refills: 3 | Status: SHIPPED | OUTPATIENT
Start: 2019-06-05 | End: 2020-04-02

## 2019-06-06 ENCOUNTER — THERAPY VISIT (OUTPATIENT)
Dept: PHYSICAL THERAPY | Facility: CLINIC | Age: 69
End: 2019-06-06
Payer: MEDICARE

## 2019-06-06 DIAGNOSIS — Z47.89 AFTERCARE FOLLOWING SURGERY OF THE MUSCULOSKELETAL SYSTEM: ICD-10-CM

## 2019-06-06 DIAGNOSIS — M75.102 TEAR OF LEFT ROTATOR CUFF: ICD-10-CM

## 2019-06-06 PROCEDURE — 97110 THERAPEUTIC EXERCISES: CPT | Mod: GP | Performed by: PHYSICAL THERAPIST

## 2019-06-14 ENCOUNTER — THERAPY VISIT (OUTPATIENT)
Dept: PHYSICAL THERAPY | Facility: CLINIC | Age: 69
End: 2019-06-14
Payer: MEDICARE

## 2019-06-14 DIAGNOSIS — Z47.89 AFTERCARE FOLLOWING SURGERY OF THE MUSCULOSKELETAL SYSTEM: ICD-10-CM

## 2019-06-14 DIAGNOSIS — M75.102 TEAR OF LEFT ROTATOR CUFF: ICD-10-CM

## 2019-06-14 PROCEDURE — 97110 THERAPEUTIC EXERCISES: CPT | Mod: GP

## 2019-06-14 NOTE — LETTER
DEPARTMENT OF HEALTH AND HUMAN SERVICES  CENTERS FOR MEDICARE & MEDICAID SERVICES    PLAN/UPDATED PLAN OF PROGRESS FOR OUTPATIENT REHABILITATION    PATIENTS NAME:  Mynor Curry     : 1950    PROVIDER NUMBER:    [id number]    HICN:  [id number]     PROVIDER NAME: [facility]    MEDICAL RECORD NUMBER: [id number]     START OF CARE DATE:  SOC Date: 19   TYPE:  PT    PRIMARY/TREATMENT DIAGNOSIS: (Pertinent Medical Diagnosis)     Tear of left rotator cuff  Aftercare following surgery of the musculoskeletal system    VISITS FROM START OF CARE:   15         RECERT   PROGRESS  REPORT    Progress reporting period is from 3/14/19 to 19.       SUBJECTIVE  Subjective changes noted by patient:   Subjective: States he is noting improved strength.  He will receive anterior shoulder pain with alot of flexion activities. Better able to reach above shoulder level.    Current pain level is NA  .     Previous pain level was   Initial Pain level: 5/10.   Changes in function:  Yes (See Goal flowsheet attached for changes in current functional level)  Adverse reaction to treatment or activity: None    OBJECTIVE  Changes noted in objective findings:    Objective: AROM shoulder flex- 132, PROM shoulder flex- 160, abd-161, ER-90     ASSESSMENT/PLAN  Updated problem list and treatment plan: Diagnosis 1:  Left RCR    STG/LTGs have been met or progress has been made towards goals:  Yes (See Goal flow sheet completed today.)  Assessment of Progress: The patient's condition is improving.  Self Management Plans:  Patient has been instructed in a home treatment program.    Mynor continues to require the following intervention to meet STG and LTG's:  PT    Recommendations:  This patient would benefit from continued therapy.     Frequency:  1 X week, once daily  Duration:  for 7 weeks        Please refer to the daily flowsheet for treatment today, total treatment time and time spent performing 1:1 timed  "codes.            Caregiver Signature/Credentials _____________________________ Date ________       Treating Provider: Diego Hobson PT   I have reviewed and certified the need for these services and plan of treatment while under my care.        PHYSICIAN'S SIGNATURE:   _________________________________________  Date___________   Lazaro Cobb MD    Certification period:  Beginning of Cert date period: 06/12/2019 to  End of Cert period date: 09/09/2019    Functional Level Progress Report: Please see attached \"Goal Flow sheet for Functional level.\"    ____X____ Continue Services or       ________ DC Services                Service dates: From  SOC Date: 03/14/19 date to present                         "

## 2019-06-18 ENCOUNTER — TRANSFERRED RECORDS (OUTPATIENT)
Dept: HEALTH INFORMATION MANAGEMENT | Facility: CLINIC | Age: 69
End: 2019-06-18

## 2019-06-20 ENCOUNTER — THERAPY VISIT (OUTPATIENT)
Dept: PHYSICAL THERAPY | Facility: CLINIC | Age: 69
End: 2019-06-20
Payer: MEDICARE

## 2019-06-20 DIAGNOSIS — M75.102 TEAR OF LEFT ROTATOR CUFF: ICD-10-CM

## 2019-06-20 DIAGNOSIS — Z47.89 AFTERCARE FOLLOWING SURGERY OF THE MUSCULOSKELETAL SYSTEM: ICD-10-CM

## 2019-06-20 PROCEDURE — 97110 THERAPEUTIC EXERCISES: CPT | Mod: GP | Performed by: PHYSICAL THERAPIST

## 2019-06-27 ENCOUNTER — THERAPY VISIT (OUTPATIENT)
Dept: PHYSICAL THERAPY | Facility: CLINIC | Age: 69
End: 2019-06-27
Payer: MEDICARE

## 2019-06-27 DIAGNOSIS — M75.102 TEAR OF LEFT ROTATOR CUFF: ICD-10-CM

## 2019-06-27 DIAGNOSIS — Z47.89 AFTERCARE FOLLOWING SURGERY OF THE MUSCULOSKELETAL SYSTEM: ICD-10-CM

## 2019-06-27 PROCEDURE — 97110 THERAPEUTIC EXERCISES: CPT | Mod: GP | Performed by: PHYSICAL THERAPIST

## 2019-06-28 PROBLEM — G89.29 CHRONIC LOW BACK PAIN WITHOUT SCIATICA: Status: RESOLVED | Noted: 2019-02-19 | Resolved: 2019-06-28

## 2019-06-28 PROBLEM — M54.50 CHRONIC LOW BACK PAIN WITHOUT SCIATICA: Status: RESOLVED | Noted: 2019-02-19 | Resolved: 2019-06-28

## 2019-06-28 NOTE — PROGRESS NOTES
Subjective:  HPI                    Objective:  System    Physical Exam    General     ROS    Assessment/Plan:    PROGRESS  REPORT    Progress reporting period is from 3/14/19 to 6/14/19.       SUBJECTIVE  Subjective changes noted by patient:   Subjective: States he is noting improved strength.  He will receive anterior shoulder pain with alot of flexion activities. Better able to reach above shoulder level.    Current pain level is NA  .     Previous pain level was   Initial Pain level: 5/10.   Changes in function:  Yes (See Goal flowsheet attached for changes in current functional level)  Adverse reaction to treatment or activity: None    OBJECTIVE  Changes noted in objective findings:    Objective: AROM shoulder flex- 132, PROM shoulder flex- 160, abd-161, ER-90     ASSESSMENT/PLAN  Updated problem list and treatment plan: Diagnosis 1:  Left RCR    STG/LTGs have been met or progress has been made towards goals:  Yes (See Goal flow sheet completed today.)  Assessment of Progress: The patient's condition is improving.  Self Management Plans:  Patient has been instructed in a home treatment program.    Mynor continues to require the following intervention to meet STG and LTG's:  PT    Recommendations:  This patient would benefit from continued therapy.     Frequency:  1 X week, once daily  Duration:  for 7 weeks        Please refer to the daily flowsheet for treatment today, total treatment time and time spent performing 1:1 timed codes.

## 2019-06-28 NOTE — PROGRESS NOTES
Patient seen for one time evaluation and treatment.  Patient has continued with a HEP independently.  Please see initial evaluation for further information.

## 2019-07-10 ENCOUNTER — TRANSFERRED RECORDS (OUTPATIENT)
Dept: HEALTH INFORMATION MANAGEMENT | Facility: CLINIC | Age: 69
End: 2019-07-10

## 2019-07-11 ENCOUNTER — THERAPY VISIT (OUTPATIENT)
Dept: PHYSICAL THERAPY | Facility: CLINIC | Age: 69
End: 2019-07-11
Payer: MEDICARE

## 2019-07-11 DIAGNOSIS — M75.102 TEAR OF LEFT ROTATOR CUFF: ICD-10-CM

## 2019-07-11 DIAGNOSIS — Z47.89 AFTERCARE FOLLOWING SURGERY OF THE MUSCULOSKELETAL SYSTEM: ICD-10-CM

## 2019-07-11 PROCEDURE — 97110 THERAPEUTIC EXERCISES: CPT | Mod: GP | Performed by: PHYSICAL THERAPIST

## 2019-07-26 ENCOUNTER — TRANSFERRED RECORDS (OUTPATIENT)
Dept: HEALTH INFORMATION MANAGEMENT | Facility: CLINIC | Age: 69
End: 2019-07-26

## 2019-07-29 ENCOUNTER — OFFICE VISIT (OUTPATIENT)
Dept: FAMILY MEDICINE | Facility: CLINIC | Age: 69
End: 2019-07-29
Payer: MEDICARE

## 2019-07-29 ENCOUNTER — THERAPY VISIT (OUTPATIENT)
Dept: PHYSICAL THERAPY | Facility: CLINIC | Age: 69
End: 2019-07-29
Payer: MEDICARE

## 2019-07-29 ENCOUNTER — TRANSFERRED RECORDS (OUTPATIENT)
Dept: HEALTH INFORMATION MANAGEMENT | Facility: CLINIC | Age: 69
End: 2019-07-29

## 2019-07-29 VITALS
HEART RATE: 69 BPM | HEIGHT: 68 IN | TEMPERATURE: 98.3 F | DIASTOLIC BLOOD PRESSURE: 60 MMHG | SYSTOLIC BLOOD PRESSURE: 102 MMHG | OXYGEN SATURATION: 97 % | WEIGHT: 168 LBS | BODY MASS INDEX: 25.46 KG/M2

## 2019-07-29 DIAGNOSIS — Z01.818 PREOP GENERAL PHYSICAL EXAM: Primary | ICD-10-CM

## 2019-07-29 DIAGNOSIS — Z47.89 AFTERCARE FOLLOWING SURGERY OF THE MUSCULOSKELETAL SYSTEM: ICD-10-CM

## 2019-07-29 DIAGNOSIS — S46.011S TRAUMATIC INCOMPLETE TEAR OF RIGHT ROTATOR CUFF, SEQUELA: ICD-10-CM

## 2019-07-29 DIAGNOSIS — M75.102 TEAR OF LEFT ROTATOR CUFF: ICD-10-CM

## 2019-07-29 LAB
HGB BLD-MCNC: 13.6 G/DL (ref 13.3–17.7)
PLATELET # BLD AUTO: 292 10E9/L (ref 150–450)

## 2019-07-29 PROCEDURE — 85018 HEMOGLOBIN: CPT | Performed by: NURSE PRACTITIONER

## 2019-07-29 PROCEDURE — 97110 THERAPEUTIC EXERCISES: CPT | Mod: GP | Performed by: PHYSICAL THERAPIST

## 2019-07-29 PROCEDURE — 93000 ELECTROCARDIOGRAM COMPLETE: CPT | Performed by: NURSE PRACTITIONER

## 2019-07-29 PROCEDURE — 36415 COLL VENOUS BLD VENIPUNCTURE: CPT | Performed by: NURSE PRACTITIONER

## 2019-07-29 PROCEDURE — 99214 OFFICE O/P EST MOD 30 MIN: CPT | Performed by: NURSE PRACTITIONER

## 2019-07-29 PROCEDURE — 85049 AUTOMATED PLATELET COUNT: CPT | Performed by: NURSE PRACTITIONER

## 2019-07-29 ASSESSMENT — MIFFLIN-ST. JEOR: SCORE: 1496.54

## 2019-07-29 NOTE — PROGRESS NOTES
[facility] [place]  [address] [address]  [phone]  Dept: [phone]    PRE-OP EVALUATION:  Today's date: 2019    **PREOP FAXED** Josette Gambino CMA     Mynor Curry (: 1950) presents for pre-operative evaluation assessment as requested by Dr. Lazaro Cobb.  He requires evaluation and anesthesia risk assessment prior to undergoing surgery/procedure for treatment of right rotator cuff tear     Proposed Surgery/ Procedure: right shoulder repair   Date of Surgery/ Procedure: 2019  Time of Surgery/ Procedure: 2:00 pm   Hospital/Surgical Facility: [facility]   Fax number for surgical facility: [phone]  Primary Physician: Doug Seo Jr.  Type of Anesthesia Anticipated: General    Patient has a Health Care Directive or Living Will:  YES     1. NO - Do you have a history of heart attack, stroke, stent, bypass or surgery on an artery in the head, neck, heart or legs?  2. NO - Do you ever have any pain or discomfort in your chest?  3. NO - Do you have a history of  Heart Failure?  4. NO - Are you troubled by shortness of breath when: walking on the level, up a slight hill or at night?  5. NO - Do you currently have a cold, bronchitis or other respiratory infection?  6. NO - Do you have a cough, shortness of breath or wheezing?  7. NO - Do you sometimes get pains in the calves of your legs when you walk?  8. NO - Do you or anyone in your family have previous history of blood clots?  9. NO - Do you or does anyone in your family have a serious bleeding problem such as prolonged bleeding following surgeries or cuts?  10. NO - Have you ever had problems with anemia or been told to take iron pills?  11. NO - Have you had any abnormal blood loss such as black, tarry or bloody stools, or abnormal vaginal bleeding?  12. NO - Have you ever had a blood transfusion?  13. NO - Have you or any of your relatives ever had problems with anesthesia?  14. NO -  Do you have sleep apnea, excessive snoring or daytime drowsiness?  15. NO - Do you have any prosthetic heart valves?  16. YES - DO YOU HAVE PROSTHETIC JOINTS?   17. NO - Is there any chance that you may be pregnant?      HPI:     HPI related to upcoming procedure: Suffered 60% tear of right rotator cuff two weeks ago while moving luggage in a trunk. He was a , so he has myriad musculoskeletal issues (back, left shoulder, etc.). He has had surgery before on his left shoulder.        See problem list for active medical problems.  Problems all longstanding and stable, except as noted/documented.  See ROS for pertinent symptoms related to these conditions.      MEDICAL HISTORY:     Patient Active Problem List    Diagnosis Date Noted     Tear of left rotator cuff 03/14/2019     Priority: Medium     Aftercare following surgery of the musculoskeletal system 03/14/2019     Priority: Medium     Coronary artery disease 02/04/2017     Priority: Medium     Cardiologist Dr. Ng at [facility]:  Feb 2017: an echocardiogram suggested the patient possibly had inferior hypokinesis and he had a stress nuclear scan which was abnormal.  These were as part of an investigation for vasovagal syncope that he has had for the last 20 years, but was getting worse over the previous few months before he presented to our clinic.  He had a CT angiogram performed because of the abnormal stress test which showed only modest coronary artery disease.  Specifically, it showed mild proximal LAD disease, mild to moderate LAD disease, trivial circumflex disease and mild right coronary artery disease. He also had an MRI scan which showed that he did have normal wall motion and that there was no evidence of scar.       Hyperlipidemia LDL goal <70 02/04/2017     Priority: Medium     Essential hypertension with goal blood pressure less than 140/90 10/17/2016     Priority: Medium     Benign non-nodular prostatic hyperplasia with  lower urinary tract symptoms 10/17/2016     Priority: Medium     Erectile dysfunction due to arterial insufficiency 08/31/2015     Priority: Medium     Advanced directives, counseling/discussion 08/31/2015     Priority: Medium     Advance Care Planning 8/31/2015: ACP Review and Resources Provided:  Reviewed chart for advance care plan.  Mynor Curry has no plan or code status on file however states presence of ACP document. Copy requested. Confirmed code status reflects current choices pending receipt of document/advance care plan review. Confirmed/documented legally designated decision maker(s). Added by Doug Seo Jr.             S/P prosthetic total arthroplasty of the hip 04/30/2014     Priority: Medium     Low back pain 07/17/2013     Priority: Medium      Past Medical History:   Diagnosis Date     Arthritis      Coronary artery disease     Mild     Erectile dysfunction due to arterial insufficiency 8/31/2015     Hyperlipidemia LDL goal <70 2/4/2017     Hypertension goal BP (blood pressure) < 140/90 7/17/2013    Since 2006      Syncope     vasovagal     Past Surgical History:   Procedure Laterality Date     ARTHROPLASTY HIP ANTERIOR  4/30/2014    Procedure: ARTHROPLASTY HIP ANTERIOR;  Surgeon: Ayaz Butcher MD;  Location: [facility] OR     ARTHROSCOPY KNEE WITH MEDIAL MENISCECTOMY  2001    left     ARTHROSCOPY SHOULDER  1999    right     COLONOSCOPY       HEMILAMINECTOMY, DISCECTOMY LUMBAR ONE LEVEL, COMBINED  1989     HERNIA REPAIR, INGUINAL RT/LT  2011    bilateral     ORTHOPEDIC SURGERY       Current Outpatient Medications   Medication Sig Dispense Refill     aspirin EC 81 MG tablet Take 1 tablet (81 mg) by mouth daily       Cholecalciferol (VITAMIN D) 2000 UNITS CAPS Take by mouth daily       fluticasone (FLONASE) 50 MCG/ACT spray Spray 1-2 sprays into both nostrils daily 1 Bottle 0     gabapentin (NEURONTIN) 300 MG capsule Take 300 mg by mouth 2 times daily        Ibuprofen (ADVIL PO) Take 400  mg by mouth every 4 hours as needed        losartan-hydrochlorothiazide (HYZAAR) 100-12.5 MG tablet Take 1 tablet by mouth daily 90 tablet 3     Multiple Vitamins-Minerals (DAILY MULTIVITAMIN PO) Take by mouth daily       multivitamin  with lutein (OCUVITE WITH LTEIN) CAPS per capsule Take 1 capsule by mouth daily       omeprazole (PRILOSEC) 20 MG DR capsule TAKE 1 CAPSULE (20 MG) BY MOUTH DAILY 30 capsule 1     rosuvastatin (CRESTOR) 20 MG tablet TAKE 1 TABLET EVERY DAY 90 tablet 3     sildenafil (VIAGRA) 100 MG tablet Take 0.5-1 tablets ( mg) by mouth daily as needed 6 tablet 11     valACYclovir (VALTREX) 500 MG tablet One tablet by mouth twice daily for three days for each outbreak 30 tablet 3     OTC products: None, except as noted above    Allergies   Allergen Reactions     Augmentin      vomiting      Latex Allergy: NO    Social History     Tobacco Use     Smoking status: Never Smoker     Smokeless tobacco: Never Used   Substance Use Topics     Alcohol use: Yes     Alcohol/week: 0.0 oz     Comment: 2-3 drinks per week     History   Drug Use No       REVIEW OF SYSTEMS:   CONSTITUTIONAL: NEGATIVE for fever, chills, change in weight  INTEGUMENTARY/SKIN: NEGATIVE for worrisome rashes, moles or lesions  EYES: NEGATIVE for vision changes or irritation  ENT/MOUTH: NEGATIVE for ear, mouth and throat problems  RESP: NEGATIVE for significant cough or SOB  BREAST: NEGATIVE for masses, tenderness or discharge  CV: NEGATIVE for chest pain, palpitations or peripheral edema  GI: NEGATIVE for nausea, abdominal pain, heartburn, or change in bowel habits  : NEGATIVE for frequency, dysuria, or hematuria  MUSCULOSKELETAL: See HPI  NEURO: NEGATIVE for weakness, dizziness or paresthesias  ENDOCRINE: NEGATIVE for temperature intolerance, skin/hair changes  HEME: NEGATIVE for bleeding problems  PSYCHIATRIC: NEGATIVE for changes in mood or affect    EXAM:   /60   Pulse 69   Temp 98.3  F (36.8  C) (Tympanic)   Ht 1.727  "shawn (5' 8\")   Wt 76.2 kg (168 lb)   SpO2 97%   BMI 25.54 kg/m      GENERAL APPEARANCE: healthy, alert and no distress     EYES: EOMI,  PERRL     HENT: ear canals and TM's normal and nose and mouth without ulcers or lesions     NECK: no adenopathy, no asymmetry, masses, or scars and thyroid normal to palpation     RESP: lungs clear to auscultation - no rales, rhonchi or wheezes     CV: regular rates and rhythm, normal S1 S2, no S3 or S4 and no murmur, click or rub     ABDOMEN:  soft, nontender, no HSM or masses and bowel sounds normal     MS: extremities normal- no gross deformities noted, no evidence of inflammation in joints, FROM in all extremities with the exception of right shoulder (see HPI)     SKIN: no suspicious lesions or rashes     NEURO: Normal strength and tone, sensory exam grossly normal, mentation intact and speech normal     PSYCH: mentation appears normal. and affect normal/bright     LYMPHATICS: No cervical adenopathy    DIAGNOSTICS:   EKG: sinus bradycardia with RSR in V1, unchanged from previous tracings    Hemoglobin   Date Value Ref Range Status   07/29/2019 13.6 13.3 - 17.7 g/dL Final     Platelet Count   Date Value Ref Range Status   07/29/2019 292 150 - 450 10e9/L Final       Recent Labs   Lab Test 06/04/19  0912 02/20/19  1051 01/23/19  0945 04/05/18  1119  08/28/14  0919  04/30/14  2130   HGB  --  14.0  --  14.2  --  14.7   < >  --    PLT  --   --   --   --   --  267  --  288     --  136 136   < > 141  --   --    POTASSIUM 4.3  --  3.8 3.9   < > 3.8  --   --    CR 0.90  --  0.89 0.77   < > 0.86  --  0.70    < > = values in this interval not displayed.        IMPRESSION:   Reason for surgery/procedure: Right rotator cuff tear  Diagnosis/reason for consult: Preoperative clearance    The proposed surgical procedure is considered INTERMEDIATE risk.    REVISED CARDIAC RISK INDEX  The patient has the following serious cardiovascular risks for perioperative complications such as (MI, PE, " VFib and 3  AV Block):  No serious cardiac risks  INTERPRETATION: 0 risks: Class I (very low risk - 0.4% complication rate)    The patient has the following additional risks for perioperative complications:  Carotid stenosis (asymptomatic) and moderate LAD occlusion (stable and asymptomatic).       ICD-10-CM    1. Preop general physical exam Z01.818 EKG 12-lead complete w/read - Clinics     Hemoglobin     Platelet count   2. Traumatic incomplete tear of right rotator cuff, sequela S46.011S        RECOMMENDATIONS:     --Patient is to take all scheduled medications on the day of surgery EXCEPT for modifications listed below.    APPROVAL GIVEN to proceed with proposed procedure, without further diagnostic evaluation       Signed Electronically by: Octavio España NP    Copy of this evaluation report is provided to requesting physician.    [facility] Preop Guidelines    Revised Cardiac Risk Index

## 2019-08-12 ENCOUNTER — TRANSFERRED RECORDS (OUTPATIENT)
Dept: HEALTH INFORMATION MANAGEMENT | Facility: CLINIC | Age: 69
End: 2019-08-12

## 2019-08-15 ENCOUNTER — THERAPY VISIT (OUTPATIENT)
Dept: PHYSICAL THERAPY | Facility: CLINIC | Age: 69
End: 2019-08-15
Payer: MEDICARE

## 2019-08-15 DIAGNOSIS — Z47.89 AFTERCARE FOLLOWING SURGERY OF THE MUSCULOSKELETAL SYSTEM: Primary | ICD-10-CM

## 2019-08-15 DIAGNOSIS — M75.121 COMPLETE TEAR OF RIGHT ROTATOR CUFF: ICD-10-CM

## 2019-08-15 PROCEDURE — 97110 THERAPEUTIC EXERCISES: CPT | Mod: GP | Performed by: PHYSICAL THERAPIST

## 2019-08-15 PROCEDURE — 97161 PT EVAL LOW COMPLEX 20 MIN: CPT | Mod: GP | Performed by: PHYSICAL THERAPIST

## 2019-08-15 NOTE — PROGRESS NOTES
[facility] Initial Evaluation  Subjective:  Pt presents to PT s/p right shoulder scope [facility] with mini open RCR.  DOS is 7/31/19.      The history is provided by the patient.   Mynor Curry being seen for Initial therapy visit for right rotator cuff surgery, supraspinatus tear.   Problem began 7/19/2019. Where condition occurred: at home.Problem occurred: Lifting a suitcase  and reported as 1/10 on pain scale. General health as reported by patient is excellent. Pertinent medical history includes:  High blood pressure and osteoarthritis.    Surgeries include:  Orthopedic surgery.  Current medications:  Anti-inflammatory, high blood pressure medication and pain medication.   Primary job tasks include:  Driving and lifting/carrying.  Pain is described as aching and is intermittent. Pain is worse during the night. Since onset symptoms are gradually improving. Special tests:  MRI and x-ray. Previous treatment includes surgery. There was significant improvement following previous treatment.   Patient is Retired.       Type of problem:  Right shoulder   Condition occurred with:  Lifting. This is a new condition    Patient reports pain:  In the joint and lateral. Radiates to:  Upper arm. Associated symptoms:  Loss of strength and loss of motion/stiffness. Exacerbated by: too much movement, positions. and relieved by ice and analgesics.                      Objective:  System                   Shoulder Evaluation:  ROM:  AROM:  not assessed                            PROM:    Flexion:  Right: 90      Abduction:  Right:  80      External Rotation:  Right:  20                    Strength:  not assessed                                                               General     ROS    Assessment/Plan:    Patient is a 68 year old male with right side shoulder complaints.    Patient has the following significant findings with corresponding treatment plan.                Diagnosis 1:  Right RCR  Pain -  hot/cold  therapy, education and home program  Decreased ROM/flexibility - manual therapy, therapeutic exercise and home program  Decreased strength - therapeutic exercise, therapeutic activities and home program    Therapy Evaluation Codes:   1) History comprised of:   Personal factors that impact the plan of care:      None.    Comorbidity factors that impact the plan of care are:      None.     Medications impacting care: None.  2) Examination of Body Systems comprised of:   Body structures and functions that impact the plan of care:      Shoulder.   Activity limitations that impact the plan of care are:      Bathing, Cooking, Driving, Dressing, Lifting, Sports and Sleeping.  3) Clinical presentation characteristics are:   Stable/Uncomplicated.  4) Decision-Making    Low complexity using standardized patient assessment instrument and/or measureable assessment of functional outcome.  Cumulative Therapy Evaluation is: Low complexity.    Previous and current functional limitations:  (See Goal Flow Sheet for this information)    Short term and Long term goals: (See Goal Flow Sheet for this information)     Communication ability:  Patient appears to be able to clearly communicate and understand verbal and written communication and follow directions correctly.  Treatment Explanation - The following has been discussed with the patient:   RX ordered/plan of care  Anticipated outcomes  Possible risks and side effects  This patient would benefit from PT intervention to resume normal activities.   Rehab potential is good.    Frequency:  2 X week, once daily  Duration:  for 3 weeks tapering to 1 X a week over 6 weeks  Discharge Plan:  Achieve all LTG.  Independent in home treatment program.  Reach maximal therapeutic benefit.    Please refer to the daily flowsheet for treatment today, total treatment time and time spent performing 1:1 timed codes.

## 2019-08-15 NOTE — LETTER
DEPARTMENT OF HEALTH AND HUMAN SERVICES  CENTERS FOR MEDICARE & MEDICAID SERVICES    PLAN/UPDATED PLAN OF PROGRESS FOR OUTPATIENT REHABILITATION    PATIENTS NAME:  Mynor Curry   : 1950    PROVIDER NUMBER:    [id number]  HICN:  [id number]    PROVIDER NAME: [facility]    MEDICAL RECORD NUMBER: [id number]     START OF CARE DATE:  SOC Date: 08/15/19   TYPE:  PT    PRIMARY/TREATMENT DIAGNOSIS: (Pertinent Medical Diagnosis)     Complete tear of right rotator cuff  Aftercare following surgery of the musculoskeletal system    VISITS FROM START OF CARE:  Rxs Used: 1     [facility] [place] [facility] Initial Evaluation  Subjective:  Pt presents to PT s/p right shoulder scope SAD with mini open RCR.  DOS is 19.    The history is provided by the patient.   Mynor Curry being seen for Initial therapy visit for right rotator cuff surgery, supraspinatus tear.   Problem began 2019. Where condition occurred: at home.Problem occurred: Lifting a suitcase  and reported as 1/10 on pain scale. General health as reported by patient is excellent. Pertinent medical history includes:  High blood pressure and osteoarthritis.    Surgeries include:  Orthopedic surgery.  Current medications:  Anti-inflammatory, high blood pressure medication and pain medication.   Primary job tasks include:  Driving and lifting/carrying.  Pain is described as aching and is intermittent. Pain is worse during the night. Since onset symptoms are gradually improving. Special tests:  MRI and x-ray. Previous treatment includes surgery. There was significant improvement following previous treatment.   Patient is Retired.   Type of problem:  Right shoulder  Condition occurred with:  Lifting. This is a new condition    Patient reports pain:  In the joint and lateral. Radiates to:  Upper arm. Associated symptoms:  Loss of strength and loss of motion/stiffness. Exacerbated by: too much movement, positions. and relieved  by ice and analgesics.    Objective:  Shoulder Evaluation:  ROM:  AROM:  not assessed  PROM:    Flexion:  Right: 90    Abduction:  Right:  80  External Rotation:  Right:  20      PATIENTS NAME:  Mynor Curry   : 1950    Strength:  not assessed    Assessment/Plan:    Patient is a 68 year old male with right side shoulder complaints.    Patient has the following significant findings with corresponding treatment plan.                Diagnosis 1:  Right RCR  Pain -  hot/cold therapy, education and home program  Decreased ROM/flexibility - manual therapy, therapeutic exercise and home program  Decreased strength - therapeutic exercise, therapeutic activities and home program    Therapy Evaluation Codes:   1) History comprised of:   Personal factors that impact the plan of care:      None.    Comorbidity factors that impact the plan of care are:      None.     Medications impacting care: None.  2) Examination of Body Systems comprised of:   Body structures and functions that impact the plan of care:      Shoulder.   Activity limitations that impact the plan of care are:      Bathing, Cooking, Driving, Dressing, Lifting, Sports and Sleeping.  3) Clinical presentation characteristics are:   Stable/Uncomplicated.  4) Decision-Making    Low complexity using standardized patient assessment instrument and/or measureable assessment of functional outcome.  Cumulative Therapy Evaluation is: Low complexity.    Previous and current functional limitations:  (See Goal Flow Sheet for this information)    Short term and Long term goals: (See Goal Flow Sheet for this information)     Communication ability:  Patient appears to be able to clearly communicate and understand verbal and written communication and follow directions correctly.  Treatment Explanation - The following has been discussed with the patient:   RX ordered/plan of care  Anticipated outcomes  Possible risks and side effects  This patient would benefit from PT  "intervention to resume normal activities.   Rehab potential is good.    Frequency:  2 X week, once daily  Duration:  for 6 weeks   Discharge Plan:  Achieve all LTG.  Independent in home treatment program.  Reach maximal therapeutic benefit.              PATIENTS NAME:  Mynor Curry   : 1950            Caregiver Signature/Credentials _____________________________ Date ________       Treating Provider: Diego Hobson, PT   I have reviewed and certified the need for these services and plan of treatment while under my care.        PHYSICIAN'S SIGNATURE:   _________________________________________  Date___________   Lazaro Cobb MD    Certification period:  Beginning of Cert date period: 08/15/19 to  End of Cert period date: 19     Functional Level Progress Report: Please see attached \"Goal Flow sheet for Functional level.\"    ____X____ Continue Services or       ________ DC Services                Service dates: From  SOC Date: 08/15/19 date to present                         "

## 2019-08-23 ENCOUNTER — THERAPY VISIT (OUTPATIENT)
Dept: PHYSICAL THERAPY | Facility: CLINIC | Age: 69
End: 2019-08-23
Payer: MEDICARE

## 2019-08-23 DIAGNOSIS — Z47.89 AFTERCARE FOLLOWING SURGERY OF THE MUSCULOSKELETAL SYSTEM: ICD-10-CM

## 2019-08-23 DIAGNOSIS — M75.121 COMPLETE TEAR OF RIGHT ROTATOR CUFF: ICD-10-CM

## 2019-08-23 PROCEDURE — 97110 THERAPEUTIC EXERCISES: CPT | Mod: GP

## 2019-08-29 ENCOUNTER — THERAPY VISIT (OUTPATIENT)
Dept: PHYSICAL THERAPY | Facility: CLINIC | Age: 69
End: 2019-08-29
Payer: MEDICARE

## 2019-08-29 DIAGNOSIS — M75.121 COMPLETE TEAR OF RIGHT ROTATOR CUFF: ICD-10-CM

## 2019-08-29 DIAGNOSIS — Z47.89 AFTERCARE FOLLOWING SURGERY OF THE MUSCULOSKELETAL SYSTEM: ICD-10-CM

## 2019-08-29 PROCEDURE — 97110 THERAPEUTIC EXERCISES: CPT | Mod: GP | Performed by: PHYSICAL THERAPIST

## 2019-09-03 ENCOUNTER — TRANSFERRED RECORDS (OUTPATIENT)
Dept: HEALTH INFORMATION MANAGEMENT | Facility: CLINIC | Age: 69
End: 2019-09-03

## 2019-09-05 ENCOUNTER — THERAPY VISIT (OUTPATIENT)
Dept: PHYSICAL THERAPY | Facility: CLINIC | Age: 69
End: 2019-09-05
Payer: MEDICARE

## 2019-09-05 DIAGNOSIS — Z47.89 AFTERCARE FOLLOWING SURGERY OF THE MUSCULOSKELETAL SYSTEM: ICD-10-CM

## 2019-09-05 DIAGNOSIS — M75.121 COMPLETE TEAR OF RIGHT ROTATOR CUFF: ICD-10-CM

## 2019-09-05 PROCEDURE — 97110 THERAPEUTIC EXERCISES: CPT | Mod: GP | Performed by: PHYSICAL THERAPIST

## 2019-09-05 NOTE — LETTER
[facility]  [address]  [place] [facility] [place] [id number] [phone]    2019    Re: Mynor Curry   :   1950  MRN:  [id number]   REFERRING PHYSICIAN:   Lazaro Cobb    [facility]  Date of Initial Evaluation:  8/15/2019  Visits:  Rxs Used: 4  Reason for Referral:     Complete tear of right rotator cuff  Aftercare following surgery of the musculoskeletal system     PROGRESS  REPORT  Progress reporting period is from 8/15/19 to 19 (4 visits).       SUBJECTIVE  Subjective changes noted by patient:    Subjective: Less sore now and not wearing the sling.  Can move the shoulder/arm easier and with less pain.      Current pain level is 1/10  .     Previous pain level was  NA  .   Changes in function:  Yes (See Goal flowsheet attached for changes in current functional level)  Adverse reaction to treatment or activity: None    OBJECTIVE  Changes noted in objective findings:    Objective: PROM: ER=82, Abd=151, Flex=150, IR=40 (no pain at end range for all movements).       ASSESSMENT/PLAN  Updated problem list and treatment plan: Diagnosis 1:  S/p right RCR    STG/LTGs have been met or progress has been made towards goals:  Yes (See Goal flow sheet completed today.)  Assessment of Progress: The patient's condition is improving.  Self Management Plans:  Patient has been instructed in a home treatment program.  Mynor continues to require the following intervention to meet STG and LTG's:  PT    Recommendations:  This patient would benefit from continued therapy.     Frequency:  1 X week, once daily  Duration:  for 6 weeks              Re: Mynor Curry   :   1950                      Thank you for your referral.    INQUIRIES  Therapist: Diego Hobson, PT   [facility]  [address]  [place] [address]  Phone: [phone]  Fax: [phone]

## 2019-09-05 NOTE — PROGRESS NOTES
Subjective:  HPI                    Objective:  System    Physical Exam    General     ROS    Assessment/Plan:    PROGRESS  REPORT    Progress reporting period is from 8/15/19 to 9/5/19 (4 visits).       SUBJECTIVE  Subjective changes noted by patient:    Subjective: Less sore now and not wearing the sling.  Can move the shoulder/arm easier and with less pain.      Current pain level is 1/10  .     Previous pain level was  NA  .   Changes in function:  Yes (See Goal flowsheet attached for changes in current functional level)  Adverse reaction to treatment or activity: None    OBJECTIVE  Changes noted in objective findings:    Objective: PROM: ER=82, Abd=151, Flex=150, IR=40 (no pain at end range for all movements).       ASSESSMENT/PLAN  Updated problem list and treatment plan: Diagnosis 1:  S/p right RCR    STG/LTGs have been met or progress has been made towards goals:  Yes (See Goal flow sheet completed today.)  Assessment of Progress: The patient's condition is improving.  Self Management Plans:  Patient has been instructed in a home treatment program.    Mynor continues to require the following intervention to meet STG and LTG's:  PT    Recommendations:  This patient would benefit from continued therapy.     Frequency:  1 X week, once daily  Duration:  for 6 weeks        Please refer to the daily flowsheet for treatment today, total treatment time and time spent performing 1:1 timed codes.

## 2019-09-09 ENCOUNTER — TRANSFERRED RECORDS (OUTPATIENT)
Dept: HEALTH INFORMATION MANAGEMENT | Facility: CLINIC | Age: 69
End: 2019-09-09

## 2019-09-11 DIAGNOSIS — N52.01 ERECTILE DYSFUNCTION DUE TO ARTERIAL INSUFFICIENCY: ICD-10-CM

## 2019-09-11 RX ORDER — SILDENAFIL 100 MG/1
50-100 TABLET, FILM COATED ORAL DAILY PRN
Qty: 6 TABLET | Refills: 11 | Status: SHIPPED | OUTPATIENT
Start: 2019-09-11

## 2019-09-11 NOTE — TELEPHONE ENCOUNTER
Reason for Call:  Medication or medication refill:    Do you use a [facility]?  Name of the pharmacy and phone number for the current request:  [facility]    Name of the medication requested: sildenafil (VIAGRA) 100 MG tablet    Other request: Mynor is requesting 10 tablets for the rx    Can we leave a detailed message on this number? YES    Phone number patient can be reached at: Cell number on file:    Telephone Information:   Mobile [phone]       Best Time: any    Call taken on 9/11/2019 at 10:42 AM by Griselda Rodriguez

## 2019-09-11 NOTE — TELEPHONE ENCOUNTER
"Requested Prescriptions   Pending Prescriptions Disp Refills     sildenafil (VIAGRA) 100 MG tablet  Last Written Prescription Date:  01/09/2018  Last Fill Quantity: 6 tablet,  # refills: 11   Last Office Visit: 7/29/2019 Octavio España NP   Future Office Visit:      6 tablet 11     Sig: Take 0.5-1 tablets ( mg) by mouth daily as needed       Erectile Dysfuction Protocol Passed - 9/11/2019 10:43 AM        Passed - Absence of nitrates on medication list        Passed - Absence of Alpha Blockers on Med list        Passed - Recent (12 mo) or future (30 days) visit within the authorizing provider's specialty     Patient had office visit in the last 12 months or has a visit in the next 30 days with authorizing provider or within the authorizing provider's specialty.  See \"Patient Info\" tab in inbasket, or \"Choose Columns\" in Meds & Orders section of the refill encounter.              Passed - Medication is active on med list        Passed - Patient is age 18 or older          "

## 2019-09-11 NOTE — TELEPHONE ENCOUNTER
RX appears to be pharmacy change. Prescription approved per [facility] RN Refill Protocol. Mague Ponce R.N.

## 2019-09-12 ENCOUNTER — THERAPY VISIT (OUTPATIENT)
Dept: PHYSICAL THERAPY | Facility: CLINIC | Age: 69
End: 2019-09-12
Payer: MEDICARE

## 2019-09-12 DIAGNOSIS — M75.121 COMPLETE TEAR OF RIGHT ROTATOR CUFF: ICD-10-CM

## 2019-09-12 DIAGNOSIS — Z47.89 AFTERCARE FOLLOWING SURGERY OF THE MUSCULOSKELETAL SYSTEM: ICD-10-CM

## 2019-09-12 PROCEDURE — 97110 THERAPEUTIC EXERCISES: CPT | Mod: GP | Performed by: PHYSICAL THERAPIST

## 2019-09-26 ENCOUNTER — THERAPY VISIT (OUTPATIENT)
Dept: PHYSICAL THERAPY | Facility: CLINIC | Age: 69
End: 2019-09-26
Payer: MEDICARE

## 2019-09-26 DIAGNOSIS — M75.121 COMPLETE TEAR OF RIGHT ROTATOR CUFF: ICD-10-CM

## 2019-09-26 DIAGNOSIS — Z47.89 AFTERCARE FOLLOWING SURGERY OF THE MUSCULOSKELETAL SYSTEM: ICD-10-CM

## 2019-09-26 PROCEDURE — 97110 THERAPEUTIC EXERCISES: CPT | Mod: GP | Performed by: PHYSICAL THERAPIST

## 2019-10-16 ENCOUNTER — THERAPY VISIT (OUTPATIENT)
Dept: PHYSICAL THERAPY | Facility: CLINIC | Age: 69
End: 2019-10-16
Payer: MEDICARE

## 2019-10-16 DIAGNOSIS — M75.121 COMPLETE TEAR OF RIGHT ROTATOR CUFF: ICD-10-CM

## 2019-10-16 DIAGNOSIS — Z47.89 AFTERCARE FOLLOWING SURGERY OF THE MUSCULOSKELETAL SYSTEM: ICD-10-CM

## 2019-10-16 PROCEDURE — 97110 THERAPEUTIC EXERCISES: CPT | Mod: GP | Performed by: PHYSICAL THERAPIST

## 2019-10-16 NOTE — LETTER
[facility]  [address] [address] [phone]    2019    Re: Mynor Curry   :   1950  MRN:  [id number]   REFERRING PHYSICIAN:   Lazaro Cobb    [facility]    Date of Initial Evaluation:  08/15/2019  Visits:  Rxs Used: 7  Reason for Referral:     Complete tear of right rotator cuff  Aftercare following surgery of the musculoskeletal system    PROGRESS  REPORT  Progress reporting period is from 19 to 10/16/19.     SUBJECTIVE  Subjective changes noted by patient:    Subjective: Right shoulder is doing well.  Decided to go back to wearing the sling for 2 weeks as the shoulder had been giving him pain.  This seemed to work as the shoulder is feeling much better.      Current pain level is NA  .     Previous pain level was  NA  .   Changes in function:  Yes (See Goal flowsheet attached for changes in current functional level)  Adverse reaction to treatment or activity: None  OBJECTIVE  Changes noted in objective findings:    Objective: PROM: ER=90, Flex=153, IR=25, Abd=163.  AROM: Flex=138, Abd=138, Ext=60, IR/ext=L3.   ASSESSMENT/PLAN  Updated problem list and treatment plan: Diagnosis 1:  S/p right RCR    STG/LTGs have been met or progress has been made towards goals:  Yes (See Goal flow sheet completed today.)  Assessment of Progress: The patient's condition is improving.  Self Management Plans:  Patient has been instructed in a home treatment program.  Mynor continues to require the following intervention to meet STG and LTG's:  PT  Recommendations:  This patient would benefit from continued therapy.     Frequency:  1 X week, once daily  Duration:  for 2 weeks tapering to every other week for 4 visits        Thank you for your referral.    INQUIRIES  Therapist: Diego Hobson, PT   [facility]  [address] [address]  Phone: [phone]  Fax: [phone]

## 2019-10-16 NOTE — PROGRESS NOTES
Subjective:  HPI                    Objective:  System    Physical Exam    General     ROS    Assessment/Plan:    PROGRESS  REPORT    Progress reporting period is from 9/5/19 to 10/16/19.       SUBJECTIVE  Subjective changes noted by patient:    Subjective: Right shoulder is doing well.  Decided to go back to wearing the sling for 2 weeks as the shoulder had been giving him pain.  This seemed to work as the shoulder is feeling much better.      Current pain level is NA  .     Previous pain level was  NA  .   Changes in function:  Yes (See Goal flowsheet attached for changes in current functional level)  Adverse reaction to treatment or activity: None    OBJECTIVE  Changes noted in objective findings:    Objective: PROM: ER=90, Flex=153, IR=25, Abd=163.  AROM: Flex=138, Abd=138, Ext=60, IR/ext=L3.     ASSESSMENT/PLAN  Updated problem list and treatment plan: Diagnosis 1:  S/p right RCR    STG/LTGs have been met or progress has been made towards goals:  Yes (See Goal flow sheet completed today.)  Assessment of Progress: The patient's condition is improving.  Self Management Plans:  Patient has been instructed in a home treatment program.    Mynor continues to require the following intervention to meet STG and LTG's:  PT    Recommendations:  This patient would benefit from continued therapy.     Frequency:  1 X week, once daily  Duration:  for 2 weeks tapering to every other week for 4 visits      Please refer to the daily flowsheet for treatment today, total treatment time and time spent performing 1:1 timed codes.

## 2019-10-21 ENCOUNTER — TRANSFERRED RECORDS (OUTPATIENT)
Dept: HEALTH INFORMATION MANAGEMENT | Facility: CLINIC | Age: 69
End: 2019-10-21

## 2019-10-23 ENCOUNTER — THERAPY VISIT (OUTPATIENT)
Dept: PHYSICAL THERAPY | Facility: CLINIC | Age: 69
End: 2019-10-23
Payer: MEDICARE

## 2019-10-23 DIAGNOSIS — M75.121 COMPLETE TEAR OF RIGHT ROTATOR CUFF: ICD-10-CM

## 2019-10-23 DIAGNOSIS — Z47.89 AFTERCARE FOLLOWING SURGERY OF THE MUSCULOSKELETAL SYSTEM: ICD-10-CM

## 2019-10-23 PROCEDURE — 97110 THERAPEUTIC EXERCISES: CPT | Mod: KX | Performed by: PHYSICAL THERAPIST

## 2019-10-25 ENCOUNTER — IMMUNIZATION (OUTPATIENT)
Dept: NURSING | Facility: CLINIC | Age: 69
End: 2019-10-25
Payer: MEDICARE

## 2019-10-25 DIAGNOSIS — Z23 NEED FOR PROPHYLACTIC VACCINATION AND INOCULATION AGAINST INFLUENZA: Primary | ICD-10-CM

## 2019-10-25 PROCEDURE — 90662 IIV NO PRSV INCREASED AG IM: CPT

## 2019-10-25 PROCEDURE — G0008 ADMIN INFLUENZA VIRUS VAC: HCPCS

## 2019-10-25 PROCEDURE — 99207 ZZC NO CHARGE NURSE ONLY: CPT

## 2019-11-04 ENCOUNTER — TRANSFERRED RECORDS (OUTPATIENT)
Dept: HEALTH INFORMATION MANAGEMENT | Facility: CLINIC | Age: 69
End: 2019-11-04

## 2019-11-06 ENCOUNTER — THERAPY VISIT (OUTPATIENT)
Dept: PHYSICAL THERAPY | Facility: CLINIC | Age: 69
End: 2019-11-06
Payer: MEDICARE

## 2019-11-06 DIAGNOSIS — M75.121 COMPLETE TEAR OF RIGHT ROTATOR CUFF: ICD-10-CM

## 2019-11-06 DIAGNOSIS — Z47.89 AFTERCARE FOLLOWING SURGERY OF THE MUSCULOSKELETAL SYSTEM: ICD-10-CM

## 2019-11-06 PROCEDURE — 97110 THERAPEUTIC EXERCISES: CPT | Mod: GP | Performed by: PHYSICAL THERAPIST

## 2019-11-11 DIAGNOSIS — A60.02 HERPES SIMPLEX INFECTION OF OTHER SITE OF MALE GENITAL ORGAN: ICD-10-CM

## 2019-11-11 NOTE — TELEPHONE ENCOUNTER
"Requested Prescriptions   Pending Prescriptions Disp Refills     valACYclovir (VALTREX) 500 MG tablet [Pharmacy Med Name: VALACYCLOVIR HYDROCHLORIDE 500 MG Tablet]  Last Written Prescription Date:  4/17/2019  Last Fill Quantity: 30 tab,  # refills: 3   Last Office Visit: 7/29/2019 Seo  Future Office Visit:      30 tablet 0     Sig: TAKE ONE TABLET BY MOUTH TWICE DAILY FOR THREE DAYS FOR EACH OUTBREAK       Antivirals for Herpes Protocol Passed - 11/11/2019  1:47 PM        Passed - Patient is age 12 or older        Passed - Recent (12 mo) or future (30 days) visit within the authorizing provider's specialty     Patient has had an office visit with the authorizing provider or a provider within the authorizing providers department within the previous 12 mos or has a future within next 30 days. See \"Patient Info\" tab in inbasket, or \"Choose Columns\" in Meds & Orders section of the refill encounter.              Passed - Medication is active on med list        Passed - Normal serum creatinine on file in past 12 months     Recent Labs   Lab Test 06/04/19  0912   CR 0.90             "

## 2019-11-14 RX ORDER — VALACYCLOVIR HYDROCHLORIDE 500 MG/1
TABLET, FILM COATED ORAL
Qty: 30 TABLET | Refills: 6 | Status: SHIPPED | OUTPATIENT
Start: 2019-11-14 | End: 2020-10-21

## 2019-11-20 ENCOUNTER — THERAPY VISIT (OUTPATIENT)
Dept: PHYSICAL THERAPY | Facility: CLINIC | Age: 69
End: 2019-11-20
Payer: MEDICARE

## 2019-11-20 DIAGNOSIS — M75.121 COMPLETE TEAR OF RIGHT ROTATOR CUFF: ICD-10-CM

## 2019-11-20 DIAGNOSIS — Z47.89 AFTERCARE FOLLOWING SURGERY OF THE MUSCULOSKELETAL SYSTEM: ICD-10-CM

## 2019-11-20 PROCEDURE — 97110 THERAPEUTIC EXERCISES: CPT | Mod: KX | Performed by: PHYSICAL THERAPIST

## 2019-11-25 DIAGNOSIS — E78.5 HYPERLIPIDEMIA LDL GOAL <130: ICD-10-CM

## 2019-11-25 NOTE — TELEPHONE ENCOUNTER
"Requested Prescriptions   Pending Prescriptions Disp Refills     rosuvastatin (CRESTOR) 20 MG tablet [Pharmacy Med Name: ROSUVASTATIN CALCIUM 20 MG Tablet]  Medication may not be due for a refill.  Last Written Prescription Date:  2/12/2019  Last Fill Quantity: 90 tablet,  # refills: 3   Last office visit: 2/20/2019 with prescribing provider:  Carlitos     Future Office Visit:       90 tablet 0     Sig: TAKE 1 TABLET EVERY DAY       Statins Protocol Passed - 11/25/2019  2:08 PM        Passed - LDL on file in past 12 months     Recent Labs   Lab Test 06/04/19  0912   LDL 42             Passed - No abnormal creatine kinase in past 12 months     No lab results found.             Passed - Recent (12 mo) or future (30 days) visit within the authorizing provider's specialty     Patient has had an office visit with the authorizing provider or a provider within the authorizing providers department within the previous 12 mos or has a future within next 30 days. See \"Patient Info\" tab in inbasket, or \"Choose Columns\" in Meds & Orders section of the refill encounter.              Passed - Medication is active on med list        Passed - Patient is age 18 or older        "

## 2019-11-26 RX ORDER — ROSUVASTATIN CALCIUM 20 MG/1
TABLET, COATED ORAL
Qty: 90 TABLET | Refills: 0 | OUTPATIENT
Start: 2019-11-26

## 2019-11-27 ENCOUNTER — TRANSFERRED RECORDS (OUTPATIENT)
Dept: HEALTH INFORMATION MANAGEMENT | Facility: CLINIC | Age: 69
End: 2019-11-27

## 2019-12-02 ENCOUNTER — TRANSFERRED RECORDS (OUTPATIENT)
Dept: HEALTH INFORMATION MANAGEMENT | Facility: CLINIC | Age: 69
End: 2019-12-02

## 2019-12-04 DIAGNOSIS — E78.5 HYPERLIPIDEMIA LDL GOAL <130: ICD-10-CM

## 2019-12-04 RX ORDER — ROSUVASTATIN CALCIUM 20 MG/1
TABLET, COATED ORAL
Qty: 90 TABLET | Refills: 1 | Status: SHIPPED | OUTPATIENT
Start: 2019-12-04 | End: 2020-04-07

## 2019-12-20 ENCOUNTER — THERAPY VISIT (OUTPATIENT)
Dept: PHYSICAL THERAPY | Facility: CLINIC | Age: 69
End: 2019-12-20
Payer: MEDICARE

## 2019-12-20 DIAGNOSIS — Z47.89 AFTERCARE FOLLOWING SURGERY OF THE MUSCULOSKELETAL SYSTEM: ICD-10-CM

## 2019-12-20 DIAGNOSIS — M75.121 COMPLETE TEAR OF RIGHT ROTATOR CUFF: ICD-10-CM

## 2019-12-20 DIAGNOSIS — M75.102 TEAR OF LEFT ROTATOR CUFF: ICD-10-CM

## 2019-12-20 PROCEDURE — 97110 THERAPEUTIC EXERCISES: CPT | Mod: KX | Performed by: PHYSICAL THERAPIST

## 2019-12-20 NOTE — LETTER
[facility]  [address]  [facility] [address]  [phone]    2019    Re: Mynor Curry   :   1950  MRN:  [id number]   REFERRING PHYSICIAN:   Lazaro Cobb    [facility]    Date of Initial Evaluation:    Visits:  Rxs Used: 11  Reason for Referral:     Complete tear of right rotator cuff  Aftercare following surgery of the musculoskeletal system  Tear of left rotator cuff    Assessment/Plan:    DISCHARGE REPORT  Progress reporting period is from 8/15/19 to 19 (11 visits).       SUBJECTIVE  Subjective changes noted by patient:  Al reports that he is happy with how his right shoulder is doing.  He is using it with ADL's without pain or limits now.  He will still have pain in the shoulder if he falls asleep lying on it.  The left shoulder is also doing well, but feels a little weaker than the right when lifting things out from his trunk and can be painful.       Current pain level is 4/10 (at its worst)  .     Previous pain level was  NA  .   Changes in function:  Yes (See Goal flowsheet attached for changes in current functional level)  Adverse reaction to treatment or activity: None    OBJECTIVE  Changes noted in objective findings:  AROM: Left lzed=024, Right flex=141; Left abd=145, right abd=147; Left IR/ext=L3, right IR/ext=L3.  PROM: Left Flex=152, right flex=155; Left ER=90, right ER=98; Left IR=40, right IR=35  MMT: 5/5 throughout bilaterally.  Slight pain with left flex and abd.    ASSESSMENT/PLAN  Updated problem list and treatment plan: Diagnosis 1:  Right RCR    Diagnosis 2:  Left RCR     STG/LTGs have been met or progress has been made towards goals:  Yes (See Goal flow sheet completed today.)  Assessment of Progress: The patient's condition is improving.  Self Management Plans:  Patient has been instructed in a home treatment program.    Mynor continues to require the following intervention to meet  STG and LTG's:  PT intervention is no longer required to meet STG/LTG.  Re: Mynor Curry   :   1950    Recommendations:  This patient is ready to be discharged from therapy and continue their home treatment program.      Thank you for your referral.    INQUIRIES  Therapist: Diego Hobson PT  [facility]  [address] [address] [address]  Phone: [phone]  Fax: [phone]

## 2020-01-29 ENCOUNTER — TELEPHONE (OUTPATIENT)
Dept: FAMILY MEDICINE | Facility: CLINIC | Age: 70
End: 2020-01-29

## 2020-01-29 NOTE — TELEPHONE ENCOUNTER
Needs of attention regarding:  -Wellness (Physical) Visit     Health Maintenance Topics with due status: Overdue       Topic Date Due    ZOSTER IMMUNIZATION 09/11/2013    PHQ-2 01/01/2020    MICROALBUMIN 01/23/2020     Health Maintenance Topics with due status: Due On       Topic Date Due    MEDICARE ANNUAL WELLNESS VISIT 01/23/2020    PSA 01/23/2020     Health Maintenance Topics with due status: Due Soon       Topic Date Due    FALL RISK ASSESSMENT 02/20/2020       Communication:  See Letter

## 2020-01-29 NOTE — LETTER
[facility]  [address]  [place] [id number] [phone]  January 29, 2020    Mynor Curry  [address] [facility] [address]    Dear Mynor,    I care about your health and have reviewed your health plan. I have reviewed your medical conditions, medication list, and lab results and am making recommendations based on this review, to better manage your health.    You are in particular need of attention regarding:  -Wellness (Physical) Visit     I am recommending that you:  -schedule a WELLNESS (Physical) APPOINTMENT with me.   I will check fasting labs the same day - nothing to eat except water and meds for 8-10 hours prior.      Here is a list of Health Maintenance topics that are due now or due soon:  Health Maintenance Due   Topic Date Due     ZOSTER IMMUNIZATION (2 of 3) 09/11/2013     PHQ-2  01/01/2020     MICROALBUMIN  01/23/2020     MEDICARE ANNUAL WELLNESS VISIT  01/23/2020     PSA  01/23/2020     FALL RISK ASSESSMENT  02/20/2020       Please call us at [phone] (or use Shortlist) to address the above recommendations.   Thank you for trusting [facility] and we appreciate the opportunity to serve you.  We look forward to supporting your healthcare needs in the future.    Healthy Regards,    Doug Seo Jr, MD

## 2020-02-03 ENCOUNTER — TRANSFERRED RECORDS (OUTPATIENT)
Dept: HEALTH INFORMATION MANAGEMENT | Facility: CLINIC | Age: 70
End: 2020-02-03

## 2020-02-08 ASSESSMENT — ACTIVITIES OF DAILY LIVING (ADL): CURRENT_FUNCTION: NO ASSISTANCE NEEDED

## 2020-02-10 ASSESSMENT — ACTIVITIES OF DAILY LIVING (ADL): CURRENT_FUNCTION: NO ASSISTANCE NEEDED

## 2020-02-10 NOTE — PROGRESS NOTES
"SUBJECTIVE:   Mynor Curry is a 69 year old male who presents for Preventive Visit.    Are you in the first 12 months of your Medicare coverage?  No    Healthy Habits:     In general, how would you rate your overall health?  Good    Frequency of exercise:  2-3 days/week    Duration of exercise:  15-30 minutes    Do you usually eat at least 4 servings of fruit and vegetables a day, include whole grains    & fiber and avoid regularly eating high fat or \"junk\" foods?  Yes    Taking medications regularly:  Yes    Medication side effects:  None    Ability to successfully perform activities of daily living:  No assistance needed    Home Safety:  Lack of grab bars in the bathroom    Hearing Impairment:  No hearing concerns    In the past 6 months, have you been bothered by leaking of urine?  No    In general, how would you rate your overall mental or emotional health?  Excellent      PHQ-2 Total Score: 0    Additional concerns today:  Yes    LUQ ABDOMINAL PAIN     Onset: few months    Description:   Character: Dull ache  Location: left upper quadrant, underneath ribs  Radiation: None    Intensity: mild    Progression of Symptoms:  Seemed to be getting worse until a couple weeks ago when he started making some diet changes as below.     Accompanying Signs & Symptoms:  Fever/Chills?: no   Gas/Bloating: no   Nausea: no   Vomitting: no   Diarrhea?: no   Constipation:no   Dysuria or Hematuria: no    History:   Trauma: no   Previous similar pain: no    Previous tests done: Colonoscopy in 2010 for screening    Precipitating factors:   Does the pain change with:     Food: YES- after dinner (no issues after breakfast or lunch)    BM: no     Urination: no     Pain is worse when laying down.     Alleviating factors:  If he leans to the right, seems to improve.     Therapies Tried and outcome: see below    LMP:  not applicable     Has tried cutting out alcohol without improvement. He and his wife have been on a \"stronger you\" diet to " help with weight loss. Tried backing off on salad without relief. Started taking GasX and BeaNo with some relief. He went back on the omeprazole and heartburn improved. He does take ibuprofen regularly for back pain and thumb pain.     Do you feel safe in your environment? Yes    Have you ever done Advance Care Planning? (For example, a Health Directive, POLST, or a discussion with a medical provider or your loved ones about your wishes): Yes, advance care planning is on file.      Fall risk  Fallen 2 or more times in the past year?: No  Any fall with injury in the past year?: No    Cognitive Screening   1) Repeat 3 items (Leader, Season, Table)    2) Clock draw: NORMAL  3) 3 item recall: Recalls 3 objects  Results: 3 items recalled: COGNITIVE IMPAIRMENT LESS LIKELY    Mini-CogTM Copyright S Justine. Licensed by the author for use in [facility]; reprinted with permission (marilou@[facility]). All rights reserved.      Do you have sleep apnea, excessive snoring or daytime drowsiness?: no    Reviewed and updated as needed this visit by clinical staff  Tobacco  Allergies  Meds         Reviewed and updated as needed this visit by Provider        Social History     Tobacco Use     Smoking status: Never Smoker     Smokeless tobacco: Never Used   Substance Use Topics     Alcohol use: Yes     Alcohol/week: 0.0 standard drinks     Comment: 2-3 drinks per week     If you drink alcohol do you typically have >3 drinks per day or >7 drinks per week? No    Alcohol Use 2/11/2020   Prescreen: >3 drinks/day or >7 drinks/week? -   Prescreen: >3 drinks/day or >7 drinks/week? No           Current providers sharing in care for this patient include:   Patient Care Team:  Doug Seo Jr., MD as PCP - General (Family Practice)  Octavio España NP as Assigned PCP    The following health maintenance items are reviewed in Epic and correct as of today:  Health Maintenance   Topic Date Due     ZOSTER IMMUNIZATION (2 of 3)  "09/11/2013     MICROALBUMIN  01/23/2020     MEDICARE ANNUAL WELLNESS VISIT  01/23/2020     PSA  01/23/2020     FALL RISK ASSESSMENT  02/20/2020     BMP  06/04/2020     LIPID  06/04/2020     COLONOSCOPY  12/21/2020     DTAP/TDAP/TD IMMUNIZATION (2 - Td) 06/14/2023     ADVANCE CARE PLANNING  02/10/2025     HEPATITIS C SCREENING  Completed     PHQ-2  Completed     INFLUENZA VACCINE  Completed     PNEUMOCOCCAL IMMUNIZATION 65+ LOW/MEDIUM RISK  Completed     AORTIC ANEURYSM SCREENING (SYSTEM ASSIGNED)  Completed     IPV IMMUNIZATION  Aged Out     MENINGITIS IMMUNIZATION  Aged Out     Lab work is in process    Review of Systems  Constitutional, HEENT, cardiovascular, pulmonary, gi and gu systems are negative, except as otherwise noted.    OBJECTIVE:   /74   Pulse 71   Temp 97.8  F (36.6  C) (Oral)   Ht 1.727 m (5' 8\")   Wt 80.3 kg (177 lb)   SpO2 97%   BMI 26.91 kg/m   Estimated body mass index is 26.91 kg/m  as calculated from the following:    Height as of this encounter: 1.727 m (5' 8\").    Weight as of this encounter: 80.3 kg (177 lb).  Physical Exam  GENERAL: healthy, alert and no distress  EYES: Eyes grossly normal to inspection, PERRL and conjunctivae and sclerae normal  HENT: ear canals and TM's normal, nose and mouth without ulcers or lesions  NECK: no adenopathy and no asymmetry, masses, or scars  RESP: lungs clear to auscultation - no rales, rhonchi or wheezes  CV: regular rate and rhythm, normal S1 S2, no S3 or S4, no murmur, click or rub, no peripheral edema and peripheral pulses strong  ABDOMEN: soft, nontender, no hepatosplenomegaly, no masses and bowel sounds normal  MS: no gross musculoskeletal defects noted, no edema  SKIN: no suspicious lesions or rashes    Diagnostic Test Results:  Labs reviewed in Epic    ASSESSMENT / PLAN:   1. Encounter for Medicare annual wellness exam: health maintenance reviewed and updated.    2. Screening for diabetes mellitus  - Basic metabolic pane    3. Essential " "hypertension with goal blood pressure less than 140/90: at goal, following with cardiology. Continue losartan-hydrochlorothiazide.  - Albumin Random Urine Quantitative with Creat Ratio  - Basic metabolic panel    4. Screening for prostate cancer  - PROSTATE SPEC ANTIGEN SCREEN    5. Left upper quadrant pain: unclear etiology. This may represent gastritis vs PUD vs gas pain. It is only associated with food after eating -- okay to continue omeprazole for now. Continue to monitor diet. Will check blood count, liver function tests. With gas relief medications helping, may just be gas pains. Okay to continue those medications as needed. Will check H pylori. He does take ibuprofen regularly and so he is at a higher risk for gastritis/PUD. If symptoms not improving with current treatment and with dietary changes and if labs all come back negative, consider EGD for further evaluation  Of stomach given regular NSAID use. Could also consider CT abdomen in the future.  - CBC with platelets  - Hepatic panel (Albumin, ALT, AST, Bili, Alk Phos, TP)  - Helicobacter pylori Antigen Stool; Future    6. Cardiomyopathy in diseases classified elsewhere (H): stable and asymptomatic. Following with cardiology annually.     COUNSELING:  Reviewed preventive health counseling, as reflected in patient instructions       Regular exercise       Healthy diet/nutrition    Estimated body mass index is 26.91 kg/m  as calculated from the following:    Height as of this encounter: 1.727 m (5' 8\").    Weight as of this encounter: 80.3 kg (177 lb).    Weight management plan: Discussed healthy diet and exercise guidelines     reports that he has never smoked. He has never used smokeless tobacco.      Appropriate preventive services were discussed with this patient, including applicable screening as appropriate for cardiovascular disease, diabetes, osteopenia/osteoporosis, and glaucoma.  As appropriate for age/gender, discussed screening for colorectal " cancer, prostate cancer, breast cancer, and cervical cancer. Checklist reviewing preventive services available has been given to the patient.    Reviewed patients plan of care and provided an AVS. The Basic Care Plan (routine screening as documented in Health Maintenance) for Mynor meets the Care Plan requirement. This Care Plan has been established and reviewed with the Patient.    Counseling Resources:  ATP IV Guidelines  Pooled Cohorts Equation Calculator  Breast Cancer Risk Calculator  FRAX Risk Assessment  ICSI Preventive Guidelines  Dietary Guidelines for Americans, 2010  USDA's MyPlate  ASA Prophylaxis  Lung CA Screening    Gurmeet Urbina DO  [facility] [place]    Identified Health Risks:

## 2020-02-11 ENCOUNTER — OFFICE VISIT (OUTPATIENT)
Dept: FAMILY MEDICINE | Facility: CLINIC | Age: 70
End: 2020-02-11
Payer: MEDICARE

## 2020-02-11 VITALS
HEART RATE: 71 BPM | DIASTOLIC BLOOD PRESSURE: 74 MMHG | OXYGEN SATURATION: 97 % | TEMPERATURE: 97.8 F | BODY MASS INDEX: 26.83 KG/M2 | SYSTOLIC BLOOD PRESSURE: 122 MMHG | WEIGHT: 177 LBS | HEIGHT: 68 IN

## 2020-02-11 DIAGNOSIS — Z13.1 SCREENING FOR DIABETES MELLITUS: ICD-10-CM

## 2020-02-11 DIAGNOSIS — I10 ESSENTIAL HYPERTENSION WITH GOAL BLOOD PRESSURE LESS THAN 140/90: ICD-10-CM

## 2020-02-11 DIAGNOSIS — R10.12 LEFT UPPER QUADRANT PAIN: ICD-10-CM

## 2020-02-11 DIAGNOSIS — I43 CARDIOMYOPATHY IN DISEASES CLASSIFIED ELSEWHERE (H): ICD-10-CM

## 2020-02-11 DIAGNOSIS — Z00.00 ENCOUNTER FOR MEDICARE ANNUAL WELLNESS EXAM: Primary | ICD-10-CM

## 2020-02-11 DIAGNOSIS — Z12.5 SCREENING FOR PROSTATE CANCER: ICD-10-CM

## 2020-02-11 LAB
ERYTHROCYTE [DISTWIDTH] IN BLOOD BY AUTOMATED COUNT: 11.8 % (ref 10–15)
HCT VFR BLD AUTO: 39.6 % (ref 40–53)
HGB BLD-MCNC: 13.4 G/DL (ref 13.3–17.7)
MCH RBC QN AUTO: 32.4 PG (ref 26.5–33)
MCHC RBC AUTO-ENTMCNC: 33.8 G/DL (ref 31.5–36.5)
MCV RBC AUTO: 96 FL (ref 78–100)
PLATELET # BLD AUTO: 260 10E9/L (ref 150–450)
RBC # BLD AUTO: 4.13 10E12/L (ref 4.4–5.9)
WBC # BLD AUTO: 14.1 10E9/L (ref 4–11)

## 2020-02-11 PROCEDURE — 80076 HEPATIC FUNCTION PANEL: CPT | Performed by: FAMILY MEDICINE

## 2020-02-11 PROCEDURE — 85027 COMPLETE CBC AUTOMATED: CPT | Performed by: FAMILY MEDICINE

## 2020-02-11 PROCEDURE — 80048 BASIC METABOLIC PNL TOTAL CA: CPT | Performed by: FAMILY MEDICINE

## 2020-02-11 PROCEDURE — G0103 PSA SCREENING: HCPCS | Performed by: FAMILY MEDICINE

## 2020-02-11 PROCEDURE — 87338 HPYLORI STOOL AG IA: CPT | Performed by: FAMILY MEDICINE

## 2020-02-11 PROCEDURE — 82043 UR ALBUMIN QUANTITATIVE: CPT | Performed by: FAMILY MEDICINE

## 2020-02-11 PROCEDURE — 36415 COLL VENOUS BLD VENIPUNCTURE: CPT | Performed by: FAMILY MEDICINE

## 2020-02-11 PROCEDURE — G0439 PPPS, SUBSEQ VISIT: HCPCS | Performed by: FAMILY MEDICINE

## 2020-02-11 PROCEDURE — 99214 OFFICE O/P EST MOD 30 MIN: CPT | Mod: 25 | Performed by: FAMILY MEDICINE

## 2020-02-11 ASSESSMENT — MIFFLIN-ST. JEOR: SCORE: 1532.37

## 2020-02-11 NOTE — PATIENT INSTRUCTIONS
Patient Education   Personalized Prevention Plan  You are due for the preventive services outlined below.  Your care team is available to assist you in scheduling these services.  If you have already completed any of these items, please share that information with your care team to update in your medical record.  Health Maintenance Due   Topic Date Due     Zoster (Shingles) Vaccine (2 of 3) 09/11/2013     Kidney Microalbumin Urine Test  01/23/2020     Annual Wellness Visit  01/23/2020     Prostate Test  01/23/2020     FALL RISK ASSESSMENT  02/20/2020

## 2020-02-12 LAB
ALBUMIN SERPL-MCNC: 4.3 G/DL (ref 3.4–5)
ALP SERPL-CCNC: 63 U/L (ref 40–150)
ALT SERPL W P-5'-P-CCNC: 42 U/L (ref 0–70)
ANION GAP SERPL CALCULATED.3IONS-SCNC: 6 MMOL/L (ref 3–14)
AST SERPL W P-5'-P-CCNC: 20 U/L (ref 0–45)
BILIRUB DIRECT SERPL-MCNC: 0.2 MG/DL (ref 0–0.2)
BILIRUB SERPL-MCNC: 1 MG/DL (ref 0.2–1.3)
BUN SERPL-MCNC: 24 MG/DL (ref 7–30)
CALCIUM SERPL-MCNC: 9.5 MG/DL (ref 8.5–10.1)
CHLORIDE SERPL-SCNC: 105 MMOL/L (ref 94–109)
CO2 SERPL-SCNC: 26 MMOL/L (ref 20–32)
CREAT SERPL-MCNC: 0.79 MG/DL (ref 0.66–1.25)
CREAT UR-MCNC: 63 MG/DL
GFR SERPL CREATININE-BSD FRML MDRD: >90 ML/MIN/{1.73_M2}
GLUCOSE SERPL-MCNC: 115 MG/DL (ref 70–99)
MICROALBUMIN UR-MCNC: 6 MG/L
MICROALBUMIN/CREAT UR: 9.21 MG/G CR (ref 0–17)
POTASSIUM SERPL-SCNC: 4.1 MMOL/L (ref 3.4–5.3)
PROT SERPL-MCNC: 7.5 G/DL (ref 6.8–8.8)
PSA SERPL-ACNC: 1.2 UG/L (ref 0–4)
SODIUM SERPL-SCNC: 137 MMOL/L (ref 133–144)

## 2020-02-13 ENCOUNTER — MYC MEDICAL ADVICE (OUTPATIENT)
Dept: FAMILY MEDICINE | Facility: CLINIC | Age: 70
End: 2020-02-13

## 2020-02-13 DIAGNOSIS — D72.829 LEUKOCYTOSIS, UNSPECIFIED TYPE: Primary | ICD-10-CM

## 2020-02-13 DIAGNOSIS — R71.8 OTHER ABNORMALITY OF RED BLOOD CELLS: ICD-10-CM

## 2020-02-13 DIAGNOSIS — Z12.11 ENCOUNTER FOR SCREENING FOR MALIGNANT NEOPLASM OF COLON: ICD-10-CM

## 2020-02-13 DIAGNOSIS — R71.8 OTHER ABNORMALITY OF RED BLOOD CELLS: Primary | ICD-10-CM

## 2020-02-13 LAB — H PYLORI AG STL QL IA: NEGATIVE

## 2020-02-13 NOTE — TELEPHONE ENCOUNTER
Please see [employer] message. Please advise. Thank you.  SHIRLEY JarquinN, RN  [facility]

## 2020-02-18 DIAGNOSIS — Z12.11 ENCOUNTER FOR SCREENING FOR MALIGNANT NEOPLASM OF COLON: ICD-10-CM

## 2020-02-18 DIAGNOSIS — R71.8 OTHER ABNORMALITY OF RED BLOOD CELLS: ICD-10-CM

## 2020-02-18 PROCEDURE — 82274 ASSAY TEST FOR BLOOD FECAL: CPT | Performed by: FAMILY MEDICINE

## 2020-02-20 DIAGNOSIS — D72.829 LEUKOCYTOSIS, UNSPECIFIED TYPE: ICD-10-CM

## 2020-02-20 DIAGNOSIS — R71.8 OTHER ABNORMALITY OF RED BLOOD CELLS: ICD-10-CM

## 2020-02-20 LAB
BASOPHILS # BLD AUTO: 0 10E9/L (ref 0–0.2)
BASOPHILS NFR BLD AUTO: 0.2 %
COPATH REPORT: NORMAL
DIFFERENTIAL METHOD BLD: NORMAL
EOSINOPHIL # BLD AUTO: 0.2 10E9/L (ref 0–0.7)
EOSINOPHIL NFR BLD AUTO: 3.7 %
ERYTHROCYTE [DISTWIDTH] IN BLOOD BY AUTOMATED COUNT: 12 % (ref 10–15)
FERRITIN SERPL-MCNC: 63 NG/ML (ref 26–388)
FOLATE SERPL-MCNC: 43 NG/ML
HCT VFR BLD AUTO: 42.9 % (ref 40–53)
HGB BLD-MCNC: 14.4 G/DL (ref 13.3–17.7)
IRON SATN MFR SERPL: 20 % (ref 15–46)
IRON SERPL-MCNC: 78 UG/DL (ref 35–180)
LYMPHOCYTES # BLD AUTO: 2 10E9/L (ref 0.8–5.3)
LYMPHOCYTES NFR BLD AUTO: 34.6 %
MCH RBC QN AUTO: 32.4 PG (ref 26.5–33)
MCHC RBC AUTO-ENTMCNC: 33.6 G/DL (ref 31.5–36.5)
MCV RBC AUTO: 96 FL (ref 78–100)
MONOCYTES # BLD AUTO: 0.6 10E9/L (ref 0–1.3)
MONOCYTES NFR BLD AUTO: 10 %
NEUTROPHILS # BLD AUTO: 3 10E9/L (ref 1.6–8.3)
NEUTROPHILS NFR BLD AUTO: 51.5 %
PLATELET # BLD AUTO: 257 10E9/L (ref 150–450)
RBC # BLD AUTO: 4.45 10E12/L (ref 4.4–5.9)
RETICS # AUTO: 57 10E9/L (ref 25–95)
RETICS/RBC NFR AUTO: 1.3 % (ref 0.5–2)
TIBC SERPL-MCNC: 397 UG/DL (ref 240–430)
VIT B12 SERPL-MCNC: 1597 PG/ML (ref 193–986)
WBC # BLD AUTO: 5.9 10E9/L (ref 4–11)

## 2020-02-20 PROCEDURE — 85060 BLOOD SMEAR INTERPRETATION: CPT | Performed by: FAMILY MEDICINE

## 2020-02-20 PROCEDURE — 85045 AUTOMATED RETICULOCYTE COUNT: CPT | Performed by: FAMILY MEDICINE

## 2020-02-20 PROCEDURE — 82728 ASSAY OF FERRITIN: CPT | Performed by: FAMILY MEDICINE

## 2020-02-20 PROCEDURE — 85025 COMPLETE CBC W/AUTO DIFF WBC: CPT | Performed by: FAMILY MEDICINE

## 2020-02-20 PROCEDURE — 36415 COLL VENOUS BLD VENIPUNCTURE: CPT | Performed by: FAMILY MEDICINE

## 2020-02-20 PROCEDURE — 82746 ASSAY OF FOLIC ACID SERUM: CPT | Performed by: FAMILY MEDICINE

## 2020-02-20 PROCEDURE — 82607 VITAMIN B-12: CPT | Performed by: FAMILY MEDICINE

## 2020-02-20 PROCEDURE — 83550 IRON BINDING TEST: CPT | Performed by: FAMILY MEDICINE

## 2020-02-20 PROCEDURE — 83540 ASSAY OF IRON: CPT | Performed by: FAMILY MEDICINE

## 2020-02-22 LAB — HEMOCCULT STL QL IA: NEGATIVE

## 2020-03-10 ENCOUNTER — TRANSFERRED RECORDS (OUTPATIENT)
Dept: HEALTH INFORMATION MANAGEMENT | Facility: CLINIC | Age: 70
End: 2020-03-10

## 2020-03-24 PROBLEM — M19.049 DEGENERATIVE ARTHRITIS OF FINGER: Status: ACTIVE | Noted: 2020-03-24

## 2020-04-02 DIAGNOSIS — I10 ESSENTIAL HYPERTENSION WITH GOAL BLOOD PRESSURE LESS THAN 140/90: ICD-10-CM

## 2020-04-02 RX ORDER — LOSARTAN POTASSIUM AND HYDROCHLOROTHIAZIDE 12.5; 1 MG/1; MG/1
1 TABLET ORAL DAILY
Qty: 90 TABLET | Refills: 1 | Status: SHIPPED | OUTPATIENT
Start: 2020-04-02 | End: 2020-08-10

## 2020-04-07 DIAGNOSIS — E78.5 HYPERLIPIDEMIA LDL GOAL <130: ICD-10-CM

## 2020-04-07 RX ORDER — ROSUVASTATIN CALCIUM 20 MG/1
TABLET, COATED ORAL
Qty: 90 TABLET | Refills: 1 | Status: SHIPPED | OUTPATIENT
Start: 2020-04-07 | End: 2020-08-10

## 2020-05-04 ENCOUNTER — MYC MEDICAL ADVICE (OUTPATIENT)
Dept: FAMILY MEDICINE | Facility: CLINIC | Age: 70
End: 2020-05-04

## 2020-05-04 DIAGNOSIS — Z20.822 CLOSE EXPOSURE TO COVID-19 VIRUS: Primary | ICD-10-CM

## 2020-05-07 NOTE — TELEPHONE ENCOUNTER
Please see patient's MyChart response. Thank you.  SHIRLEY JarquinN, RN  [facility]

## 2020-05-11 ENCOUNTER — TRANSFERRED RECORDS (OUTPATIENT)
Dept: HEALTH INFORMATION MANAGEMENT | Facility: CLINIC | Age: 70
End: 2020-05-11

## 2020-05-11 DIAGNOSIS — Z20.822 CLOSE EXPOSURE TO COVID-19 VIRUS: ICD-10-CM

## 2020-05-11 PROCEDURE — 36415 COLL VENOUS BLD VENIPUNCTURE: CPT | Performed by: FAMILY MEDICINE

## 2020-05-11 PROCEDURE — 86769 SARS-COV-2 COVID-19 ANTIBODY: CPT | Mod: 90 | Performed by: FAMILY MEDICINE

## 2020-05-11 PROCEDURE — 99000 SPECIMEN HANDLING OFFICE-LAB: CPT | Performed by: FAMILY MEDICINE

## 2020-05-12 LAB
COVID-19 SPIKE RBD ABY TITER: NORMAL
COVID-19 SPIKE RBD ABY: NEGATIVE

## 2020-06-02 ENCOUNTER — TRANSFERRED RECORDS (OUTPATIENT)
Dept: HEALTH INFORMATION MANAGEMENT | Facility: CLINIC | Age: 70
End: 2020-06-02

## 2020-07-17 ENCOUNTER — TELEPHONE (OUTPATIENT)
Dept: FAMILY MEDICINE | Facility: CLINIC | Age: 70
End: 2020-07-17

## 2020-07-17 NOTE — TELEPHONE ENCOUNTER
Reason for Call:  Other order     Detailed comments: pt called request order for hepatitis b    pls let pt know when order place or if you have question contact pt     Phone Number Patient can be reached at: Cell number on file:    Telephone Information:   Mobile [phone]       Best Time: anytime     Can we leave a detailed message on this number? YES    Call taken on 7/17/2020 at 4:44 PM by IRA MENDOZA

## 2020-07-17 NOTE — TELEPHONE ENCOUNTER
Dr. Urbina, are you able to place order requested by patient or do you need further info? Thank you, Mague Ponce R.N.

## 2020-07-20 ENCOUNTER — MYC MEDICAL ADVICE (OUTPATIENT)
Dept: FAMILY MEDICINE | Facility: CLINIC | Age: 70
End: 2020-07-20

## 2020-07-20 DIAGNOSIS — Z11.59 NEED FOR HEPATITIS B SCREENING TEST: Primary | ICD-10-CM

## 2020-07-20 NOTE — TELEPHONE ENCOUNTER
Is he wondering about testing for presence of immunity to hepatitis B? Or does he have a concern?    Gurmeet Urbina DO  7/20/2020 2:32 PM

## 2020-07-20 NOTE — TELEPHONE ENCOUNTER
Matilda message sent to patient asking for clarification on the type of testing he is requesting.  HUI Jarquin, RN  [facility]

## 2020-07-21 NOTE — TELEPHONE ENCOUNTER
Please see [employer] message chain. Please advise. Thank you.  SHIRLEY JarquinN, RN  [facility]

## 2020-07-21 NOTE — TELEPHONE ENCOUNTER
Hepatitis B testing ordered. Okay to schedule lab-only visit.    Gurmeet Urbina,   7/21/2020 10:20 AM

## 2020-07-24 DIAGNOSIS — Z11.59 NEED FOR HEPATITIS B SCREENING TEST: ICD-10-CM

## 2020-07-24 PROCEDURE — G0499 HEPB SCREEN HIGH RISK INDIV: HCPCS | Performed by: FAMILY MEDICINE

## 2020-07-24 PROCEDURE — 36415 COLL VENOUS BLD VENIPUNCTURE: CPT | Performed by: FAMILY MEDICINE

## 2020-07-27 LAB
HBV CORE AB SERPL QL IA: NONREACTIVE
HBV SURFACE AB SERPL IA-ACNC: 0 M[IU]/ML
HBV SURFACE AG SERPL QL IA: NONREACTIVE

## 2020-08-10 ENCOUNTER — MYC MEDICAL ADVICE (OUTPATIENT)
Dept: FAMILY MEDICINE | Facility: CLINIC | Age: 70
End: 2020-08-10

## 2020-08-10 DIAGNOSIS — I10 ESSENTIAL HYPERTENSION WITH GOAL BLOOD PRESSURE LESS THAN 140/90: ICD-10-CM

## 2020-08-10 DIAGNOSIS — E78.5 HYPERLIPIDEMIA LDL GOAL <130: ICD-10-CM

## 2020-08-10 RX ORDER — LOSARTAN POTASSIUM AND HYDROCHLOROTHIAZIDE 12.5; 1 MG/1; MG/1
1 TABLET ORAL DAILY
Qty: 90 TABLET | Refills: 0 | Status: SHIPPED | OUTPATIENT
Start: 2020-08-10 | End: 2020-11-30

## 2020-08-10 RX ORDER — ROSUVASTATIN CALCIUM 20 MG/1
TABLET, COATED ORAL
Qty: 90 TABLET | Refills: 0 | Status: SHIPPED | OUTPATIENT
Start: 2020-08-10 | End: 2020-11-30

## 2020-08-24 ENCOUNTER — THERAPY VISIT (OUTPATIENT)
Dept: PHYSICAL THERAPY | Facility: CLINIC | Age: 70
End: 2020-08-24
Payer: MEDICARE

## 2020-08-24 ENCOUNTER — TRANSFERRED RECORDS (OUTPATIENT)
Dept: HEALTH INFORMATION MANAGEMENT | Facility: CLINIC | Age: 70
End: 2020-08-24

## 2020-08-24 DIAGNOSIS — M25.512 ACUTE PAIN OF LEFT SHOULDER: Primary | ICD-10-CM

## 2020-08-24 PROCEDURE — 97161 PT EVAL LOW COMPLEX 20 MIN: CPT | Mod: GP | Performed by: PHYSICAL THERAPIST

## 2020-08-24 PROCEDURE — 97110 THERAPEUTIC EXERCISES: CPT | Mod: GP | Performed by: PHYSICAL THERAPIST

## 2020-08-24 NOTE — PROGRESS NOTES
[place] [facility] Initial Evaluation  Subjective:    Patient Health History             Pertinent medical history includes: high blood pressure, implanted device and osteoarthritis.        Surgeries include:  Orthopedic surgery.    Current medications:  Anti-inflammatory, high blood pressure medication and pain medication.    Current occupation is retired.   Primary job tasks include:  Driving and lifting/carrying.                                    Objective:  System    Physical Exam    General     ROS    Assessment/Plan:

## 2020-08-24 NOTE — LETTER
DEPARTMENT OF HEALTH AND HUMAN SERVICES  CENTERS FOR MEDICARE & MEDICAID SERVICES    PLAN/UPDATED PLAN OF PROGRESS FOR OUTPATIENT REHABILITATION@       PATIENTS NAME:  Mynor Curry     : 1950    PROVIDER NUMBER:    [id number]    HICN:   [id number]    PROVIDER NAME: [place] [facility]    MEDICAL RECORD NUMBER: [id number]     START OF CARE DATE:  SOC Date: 20   TYPE:  PT    PRIMARY/TREATMENT DIAGNOSIS: (Pertinent Medical Diagnosis)  Acute pain of left shoulder    VISITS FROM START OF CARE:  Rxs Used: 1     [facility] Initial Evaluation  Subjective:  Pt describes having intermittent left shoulder pain for the last 3 months for unknown reasons.  He had a left RCR in 2019 and was having no problems.  Last week he experienced a sharp twinge of pain in his posterior shoulder when reaching down and across to adjust a foot pedal on his kayak.  He saw Dr Cobb who evaluated and x-rayed his shoulder.  He was advised to return to PT on 20.  Patient Health History  Pertinent medical history includes: high blood pressure, implanted device and osteoarthritis.   Surgeries include:  Orthopedic surgery.    Current medications:  Anti-inflammatory, high blood pressure medication and pain medication.    Current occupation is retired.   Primary job tasks include:  Driving and lifting/carrying.   The history is provided by the patient.   Therapist Generated HPI Evaluation  Type of problem:  Left shoulder.  This is a new condition.  Condition occurred with:  Unknown cause.  Patient reports pain:  Posterior, anterior and lateral.  Associated symptoms:  Loss of motion/stiffness. Symptoms are exacerbated by certain positions  and relieved by rest.  Objective:  Shoulder Evaluation:  ROM:  AROM:    Flexion:  Left:  Full      Abduction:  Left: Full       PATIENTS NAME:  Mynor Curry   : 1950    Horizontal Adduction:  Left:  Limited      Extension/Internal  Rotation:  Left:  L3    Right:  L3    PROM:    Flexion:  Left:  Full    Right: Full    Abduction:  Left:  Full    Right:  Full  Internal Rotation:  Left:  15    Right:  24  External Rotation:  Left:  90    Right:  95  Strength:    Flexion: Left:5/5    Pain: -/+      Abduction:  Left: 5/5   Pain:-/+      Internal Rotation:  Left:5/5      Pain:-      External Rotation:   Left:5/5      Pain:-/+     Elbow Flexion:  Left:5/5      Pain:-      Stability Testing:    Left shoulder stability negative testing:  Sulcus sign; Load and shift anterior and Load and shift posterior  Special Tests:    Left shoulder positive for the following special tests:  Impingement  Left shoulder negative for the following special tests:  Rotator cuff tear  Palpation:  normal  Mobility Tests:    Glenohumeral anterior left:  Normal    Glenohumeral posterior left:  Hypomobile    Glenohumeral inferior left:  Hypomobile    Assessment/Plan:    Patient is a 69 year old male with left side shoulder complaints.    Patient has the following significant findings with corresponding treatment plan.                Diagnosis 1:  Left shoulder impingement  Pain -  hot/cold therapy and education  Decreased ROM/flexibility - manual therapy, therapeutic exercise and home program  Therapy Evaluation Codes:   1) History comprised of:   Personal factors that impact the plan of care:      None.    Comorbidity factors that impact the plan of care are:      None.     Medications impacting care: None.  2) Examination of Body Systems comprised of:   Body structures and functions that impact the plan of care:      Shoulder.   Activity limitations that impact the plan of care are:      Dressing and Lifting.  3) Clinical presentation characteristics are:   Stable/Uncomplicated.  4) Decision-Making    Low complexity using standardized patient assessment instrument and/or measureable assessment of functional outcome.  Cumulative Therapy Evaluation is: Low  "complexity.    Previous and current functional limitations:  (See Goal Flow Sheet for this information)    Short term and Long term goals: (See Goal Flow Sheet for this information)     PATIENTS NAME:  Mynor Curry   : 1950      Communication ability:  Patient appears to be able to clearly communicate and understand verbal and written communication and follow directions correctly.  Treatment Explanation - The following has been discussed with the patient:   RX ordered/plan of care  Anticipated outcomes  Possible risks and side effects  This patient would benefit from PT intervention to resume normal activities.   Rehab potential is good.    Frequency:  1 X week, once daily  Duration:  for 12 weeks  Discharge Plan:  Achieve all LTG.  Independent in home treatment program.  Reach maximal therapeutic benefit.                         Caregiver Signature/Credentials _____________________________ Date ________       Treating Provider: Diego Hobson, PT       I have reviewed and certified the need for these services and plan of treatment while under my care.        PHYSICIAN'S SIGNATURE:   _____________________________________  Date___________                            Lazaro Cobb MD    Certification period:  Beginning of Cert date period: 20 to  End of Cert period date: 20     Functional Level Progress Report: Please see attached \"Goal Flow sheet for Functional level.\"    ____X____ Continue Services or       ________ DC Services                Service dates: From  SOC Date: 20 date to present                         "

## 2020-08-24 NOTE — PROGRESS NOTES
[place] [facility] Initial Evaluation  Subjective:  Pt describes having intermittent left shoulder pain for the last 3 months for unknown reasons.  He had a left RCR in February of 2019 and was having no problems.  Last week he experienced a sharp twinge of pain in his posterior shoulder when reaching down and across to adjust a foot pedal on his kayak.  He saw Dr Cobb who evaluated and x-rayed his shoulder.  He was advised to return to PT on 8/24/20.      The history is provided by the patient.   Therapist Generated HPI Evaluation         Type of problem:  Left shoulder.    This is a new condition.  Condition occurred with:  Unknown cause.    Patient reports pain:  Posterior, anterior and lateral.        Associated symptoms:  Loss of motion/stiffness. Symptoms are exacerbated by certain positions  and relieved by rest.                              Objective:  System                   Shoulder Evaluation:  ROM:  AROM:    Flexion:  Left:  Full        Abduction:  Left: Full             Horizontal Adduction:  Left:  Limited            Extension/Internal Rotation:  Left:  L3    Right:  L3    PROM:    Flexion:  Left:  Full    Right: Full      Abduction:  Left:  Full    Right:  Full    Internal Rotation:  Left:  15    Right:  24  External Rotation:  Left:  90    Right:  95                    Strength:    Flexion: Left:5/5    Pain: -/+        Abduction:  Left: 5/5   Pain:-/+        Internal Rotation:  Left:5/5      Pain:-      External Rotation:   Left:5/5      Pain:-/+         Elbow Flexion:  Left:5/5      Pain:-        Stability Testing:      Left shoulder stability negative testing:  Sulcus sign; Load and shift anterior and Load and shift posterior    Special Tests:    Left shoulder positive for the following special tests:  Impingement  Left shoulder negative for the following special tests:  Rotator cuff tear    Palpation:  normal      Mobility Tests:    Glenohumeral anterior left:  Normal     Glenohumeral posterior left:  Hypomobile    Glenohumeral inferior left:  Hypomobile                                             General     ROS    Assessment/Plan:    Patient is a 69 year old male with left side shoulder complaints.    Patient has the following significant findings with corresponding treatment plan.                Diagnosis 1:  Left shoulder impingement  Pain -  hot/cold therapy and education  Decreased ROM/flexibility - manual therapy, therapeutic exercise and home program    Therapy Evaluation Codes:   1) History comprised of:   Personal factors that impact the plan of care:      None.    Comorbidity factors that impact the plan of care are:      None.     Medications impacting care: None.  2) Examination of Body Systems comprised of:   Body structures and functions that impact the plan of care:      Shoulder.   Activity limitations that impact the plan of care are:      Dressing and Lifting.  3) Clinical presentation characteristics are:   Stable/Uncomplicated.  4) Decision-Making    Low complexity using standardized patient assessment instrument and/or measureable assessment of functional outcome.  Cumulative Therapy Evaluation is: Low complexity.    Previous and current functional limitations:  (See Goal Flow Sheet for this information)    Short term and Long term goals: (See Goal Flow Sheet for this information)     Communication ability:  Patient appears to be able to clearly communicate and understand verbal and written communication and follow directions correctly.  Treatment Explanation - The following has been discussed with the patient:   RX ordered/plan of care  Anticipated outcomes  Possible risks and side effects  This patient would benefit from PT intervention to resume normal activities.   Rehab potential is good.    Frequency:  1 X week, once daily  Duration:  for 12 weeks  Discharge Plan:  Achieve all LTG.  Independent in home treatment program.  Reach maximal therapeutic  benefit.    Please refer to the daily flowsheet for treatment today, total treatment time and time spent performing 1:1 timed codes.

## 2020-09-01 ENCOUNTER — THERAPY VISIT (OUTPATIENT)
Dept: PHYSICAL THERAPY | Facility: CLINIC | Age: 70
End: 2020-09-01
Payer: MEDICARE

## 2020-09-01 DIAGNOSIS — M25.512 ACUTE PAIN OF LEFT SHOULDER: Primary | ICD-10-CM

## 2020-09-01 PROCEDURE — 97140 MANUAL THERAPY 1/> REGIONS: CPT | Mod: GP | Performed by: PHYSICAL THERAPIST

## 2020-09-01 PROCEDURE — 97110 THERAPEUTIC EXERCISES: CPT | Mod: GP | Performed by: PHYSICAL THERAPIST

## 2020-09-04 ENCOUNTER — TRANSFERRED RECORDS (OUTPATIENT)
Dept: HEALTH INFORMATION MANAGEMENT | Facility: CLINIC | Age: 70
End: 2020-09-04

## 2020-09-08 ENCOUNTER — THERAPY VISIT (OUTPATIENT)
Dept: PHYSICAL THERAPY | Facility: CLINIC | Age: 70
End: 2020-09-08
Payer: MEDICARE

## 2020-09-08 DIAGNOSIS — M25.512 SHOULDER PAIN, LEFT: Primary | ICD-10-CM

## 2020-09-08 PROCEDURE — 97110 THERAPEUTIC EXERCISES: CPT | Mod: GP | Performed by: PHYSICAL THERAPIST

## 2020-09-08 PROCEDURE — 97140 MANUAL THERAPY 1/> REGIONS: CPT | Mod: GP | Performed by: PHYSICAL THERAPIST

## 2020-09-15 ENCOUNTER — THERAPY VISIT (OUTPATIENT)
Dept: PHYSICAL THERAPY | Facility: CLINIC | Age: 70
End: 2020-09-15
Payer: MEDICARE

## 2020-09-15 DIAGNOSIS — M25.512 ACUTE PAIN OF LEFT SHOULDER: Primary | ICD-10-CM

## 2020-09-15 PROCEDURE — 97140 MANUAL THERAPY 1/> REGIONS: CPT | Mod: GP | Performed by: PHYSICAL THERAPIST

## 2020-09-15 PROCEDURE — 97110 THERAPEUTIC EXERCISES: CPT | Mod: GP | Performed by: PHYSICAL THERAPIST

## 2020-09-24 ENCOUNTER — VIRTUAL VISIT (OUTPATIENT)
Dept: CARDIOLOGY | Facility: CLINIC | Age: 70
End: 2020-09-24
Payer: MEDICARE

## 2020-09-24 ENCOUNTER — ALLIED HEALTH/NURSE VISIT (OUTPATIENT)
Dept: FAMILY MEDICINE | Facility: CLINIC | Age: 70
End: 2020-09-24
Payer: MEDICARE

## 2020-09-24 DIAGNOSIS — Z23 NEED FOR PROPHYLACTIC VACCINATION AND INOCULATION AGAINST INFLUENZA: Primary | ICD-10-CM

## 2020-09-24 DIAGNOSIS — E78.00 PURE HYPERCHOLESTEROLEMIA: ICD-10-CM

## 2020-09-24 DIAGNOSIS — I10 ESSENTIAL HYPERTENSION WITH GOAL BLOOD PRESSURE LESS THAN 140/90: Primary | ICD-10-CM

## 2020-09-24 DIAGNOSIS — I10 ESSENTIAL HYPERTENSION: ICD-10-CM

## 2020-09-24 DIAGNOSIS — I25.810 CORONARY ARTERY DISEASE INVOLVING CORONARY BYPASS GRAFT OF NATIVE HEART WITHOUT ANGINA PECTORIS: ICD-10-CM

## 2020-09-24 DIAGNOSIS — E78.5 HYPERLIPIDEMIA LDL GOAL <130: ICD-10-CM

## 2020-09-24 LAB
ALT SERPL W P-5'-P-CCNC: 36 U/L (ref 0–70)
ANION GAP SERPL CALCULATED.3IONS-SCNC: 5 MMOL/L (ref 3–14)
BUN SERPL-MCNC: 24 MG/DL (ref 7–30)
CALCIUM SERPL-MCNC: 8.8 MG/DL (ref 8.5–10.1)
CHLORIDE SERPL-SCNC: 105 MMOL/L (ref 94–109)
CHOLEST SERPL-MCNC: 139 MG/DL
CO2 SERPL-SCNC: 26 MMOL/L (ref 20–32)
CREAT SERPL-MCNC: 0.97 MG/DL (ref 0.66–1.25)
GFR SERPL CREATININE-BSD FRML MDRD: 78 ML/MIN/{1.73_M2}
GLUCOSE SERPL-MCNC: 90 MG/DL (ref 70–99)
HDLC SERPL-MCNC: 60 MG/DL
LDLC SERPL CALC-MCNC: 62 MG/DL
NONHDLC SERPL-MCNC: 79 MG/DL
POTASSIUM SERPL-SCNC: 3.7 MMOL/L (ref 3.4–5.3)
SODIUM SERPL-SCNC: 136 MMOL/L (ref 133–144)
TRIGL SERPL-MCNC: 87 MG/DL

## 2020-09-24 PROCEDURE — 99207 ZZC NO CHARGE NURSE ONLY: CPT

## 2020-09-24 PROCEDURE — 90662 IIV NO PRSV INCREASED AG IM: CPT

## 2020-09-24 PROCEDURE — G0008 ADMIN INFLUENZA VIRUS VAC: HCPCS

## 2020-09-24 PROCEDURE — 80061 LIPID PANEL: CPT | Performed by: INTERNAL MEDICINE

## 2020-09-24 PROCEDURE — 80048 BASIC METABOLIC PNL TOTAL CA: CPT | Performed by: INTERNAL MEDICINE

## 2020-09-24 PROCEDURE — 84460 ALANINE AMINO (ALT) (SGPT): CPT | Performed by: INTERNAL MEDICINE

## 2020-09-24 PROCEDURE — 99213 OFFICE O/P EST LOW 20 MIN: CPT | Mod: 95 | Performed by: INTERNAL MEDICINE

## 2020-09-24 PROCEDURE — 36415 COLL VENOUS BLD VENIPUNCTURE: CPT | Performed by: INTERNAL MEDICINE

## 2020-09-24 NOTE — PROGRESS NOTES
"Mynor Curry is a 70 year old male who is being evaluated via a billable video visit.      The patient has been notified of following:     \"This video visit will be conducted via a call between you and your physician/provider. We have found that certain health care needs can be provided without the need for an in-person physical exam.  This service lets us provide the care you need with a video conversation.  If a prescription is necessary we can send it directly to your pharmacy.  If lab work is needed we can place an order for that and you can then stop by our lab to have the test done at a later time.    Video visits are billed at different rates depending on your insurance coverage.  Please reach out to your insurance provider with any questions.    If during the course of the call the physician/provider feels a video visit is not appropriate, you will not be charged for this service.\"    Patient has given verbal consent for Video visit? Yes  How would you like to obtain your AVS? Mail a copy  If you are dropped from the video visit, the video invite should be resent to: Text to cell phone: [phone] [employer] Text  Will anyone else be joining your video visit? No       Review Of Systems  Skin: Negative  Eyes: Positive for glasses, dry eye  Ears/Nose/Throat: Negative  Respiratory: Negative  Cardiovascular: Negative  Gastrointestinal: Negative  Genitourinary: Positive for urinary frequency; decre ased urinary stream; nocturia  Musculoskeletal: Positive for joint pain in knees, thumbs and back  Neurologic: Negative  Psychiatric: Negative  Hematologic/Lymphatic/Immunologic: Negative  Endocrine: Negative    Patient reported vitals:  BP: N/A  Heart rate: N/A  Weight: 165.4 lbs    Tiara Milian CMA      Video-Visit Details  General:  no apparent distress, normal body habitus, sitting upright.  ENT/Mouth:  membranes moist, no nasal discharge.  Normal head shape, no apparent injury or laceration.  Eyes:  no scleral " icterus, normal conjunctivae.  No observed jaundice.  Neck:  no apparent neck swelling.   Chest/Lungs:  No breathing difficulty while speaking.  No audible wheezing.  No cough during conversation.  Cardiovascular:  No obviously elevated jugular venous pressure.  No apparent edema bilaterally in LE.   Abdomen:  no obvious abdominal distention.   Extremities:  no apparent cyanosis.  Skin:  no xanthelasma.  No facial lacerations.  Neurologic:  Normal arm motion bilateral, no tremors.    Psychiatric:  Alert and oriented x3, calm demeanor    The rest of the comprehensive physical examination is deferred due to public health emergency video visit restrictions.    Type of service:  Video Visit    Video Start Time: 14.06  Video End Time: 14:12    Originating Location (pt. Location): Home    Distant Location (provider location):  [facility]     Platform used for Video Visit: Larisa Ng MD [facility] FRCPI

## 2020-09-24 NOTE — PROGRESS NOTES
Service Date: 09/24/2020      CARDIOLOGY FOLLOWUP VISIT      REFERRING PHYSICIAN:  Doug Seo Jr., MD       HISTORY OF PRESENT ILLNESS:  It is my pleasure to see your patient, Mynor Curry.  Mr. Curry has a past history of vasovagal syncope but also coronary artery disease based upon a CT coronary angiogram.  The CT scan showed the patient had mild proximal left anterior descending artery disease, mild-to-moderate mid left anterior descending artery disease and trivial circumflex disease as well as mild right coronary artery disease.  The CT scan was performed because the patient had a false-positive scan suggesting a small-to-moderate area of mild ischemia in the anterior and anteroseptal walls.  This, of course, turned out to be a false positive.  We also performed a cardiac MRI because an echocardiogram on 09/19/2018 showed a mild-to-moderate area of basal inferior wall hypokinesis.  This too turned out to be a false positive, as the wall motion was normal on the MRI.      He is feeling well at present.  He has no chest pains, no chest pressure, no unusual shortness of breath and no syncope.        LABORATORY STUDIES:  His lipid profile shows an LDL of 62, HDL of 60 and triglycerides of 87 with a total cholesterol of 139.  His basic metabolic profile was also normal with a sodium of 136, potassium of 3.7, BUN of 24 and a creatinine of 0.97 with GFR 78.  His blood pressure today was normal at 129/86.      IMPRESSION:   1.  Coronary artery disease.  The patient is asymptomatic with respect to coronary artery disease.   2.  Excellent lipid profile, well within secondary prevention guidelines on high-intensity statin therapy (rosuvastatin 20 mg per day).   3.  No further episodes of vasovagal syncope.   4.  Normotensive.      PLAN:  We will continue the patient on his present medications.  I will see the patient back again in 1 year's time but as always, he has been told to contact me if he has any questions or  any concerns.      cc:   Doug Seo Jr., MD    [facility]    [address] [place] [address]         HERMINIA GALLAGHER MD, [facility]             D: 2020   T: 2020   MT: ZOE      Name:     BRINA PALACIOS   MRN:      [id number]        Account:      [id number]   :      1950           Service Date: 2020      Document: [id number]

## 2020-09-24 NOTE — LETTER
"9/24/2020    Doug Seo Jr, MD  [address] [place] [address]    RE: Mynor Curry       Dear Colleague,    I had the pleasure of seeing Mynor Curry in the [facility].    Mynor Curry is a 70 year old male who is being evaluated via a billable video visit.      The patient has been notified of following:     \"This video visit will be conducted via a call between you and your physician/provider. We have found that certain health care needs can be provided without the need for an in-person physical exam.  This service lets us provide the care you need with a video conversation.  If a prescription is necessary we can send it directly to your pharmacy.  If lab work is needed we can place an order for that and you can then stop by our lab to have the test done at a later time.    Video visits are billed at different rates depending on your insurance coverage.  Please reach out to your insurance provider with any questions.    If during the course of the call the physician/provider feels a video visit is not appropriate, you will not be charged for this service.\"    Patient has given verbal consent for Video visit? Yes  How would you like to obtain your AVS? Mail a copy  If you are dropped from the video visit, the video invite should be resent to: Text to cell phone: [phone] Vo[employer] Text  Will anyone else be joining your video visit? No       Review Of Systems  Skin: Negative  Eyes: Positive for glasses, dry eye  Ears/Nose/Throat: Negative  Respiratory: Negative  Cardiovascular: Negative  Gastrointestinal: Negative  Genitourinary: Positive for urinary frequency; decre ased urinary stream; nocturia  Musculoskeletal: Positive for joint pain in knees, thumbs and back  Neurologic: Negative  Psychiatric: Negative  Hematologic/Lymphatic/Immunologic: Negative  Endocrine: Negative    Patient reported vitals:  BP: N/A  Heart rate: N/A  Weight: 165.4 lbs    Tiara Milian, " [facility]      Video-Visit Details  General:  no apparent distress, normal body habitus, sitting upright.  ENT/Mouth:  membranes moist, no nasal discharge.  Normal head shape, no apparent injury or laceration.  Eyes:  no scleral icterus, normal conjunctivae.  No observed jaundice.  Neck:  no apparent neck swelling.   Chest/Lungs:  No breathing difficulty while speaking.  No audible wheezing.  No cough during conversation.  Cardiovascular:  No obviously elevated jugular venous pressure.  No apparent edema bilaterally in LE.   Abdomen:  no obvious abdominal distention.   Extremities:  no apparent cyanosis.  Skin:  no xanthelasma.  No facial lacerations.  Neurologic:  Normal arm motion bilateral, no tremors.    Psychiatric:  Alert and oriented x3, calm demeanor    The rest of the comprehensive physical examination is deferred due to public health emergency video visit restrictions.    Type of service:  Video Visit    Video Start Time: 14.06  Video End Time: 14:12    Originating Location (pt. Location): Home    Distant Location (provider location):  [facility]     Platform used for Video Visit: Larisa Ng MD [facility] FRCPI        Service Date: 09/24/2020      CARDIOLOGY FOLLOWUP VISIT      REFERRING PHYSICIAN:  Doug Seo Jr., MD       HISTORY OF PRESENT ILLNESS:  It is my pleasure to see your patient, Mynor Curry.  Mr. Curry has a past history of vasovagal syncope but also coronary artery disease based upon a CT coronary angiogram.  The CT scan showed the patient had mild proximal left anterior descending artery disease, mild-to-moderate mid left anterior descending artery disease and trivial circumflex disease as well as mild right coronary artery disease.  The CT scan was performed because the patient had a false-positive scan suggesting a small-to-moderate area of mild ischemia in the anterior and anteroseptal walls.  This, of course, turned out to be a false  positive.  We also performed a cardiac MRI because an echocardiogram on 2018 showed a mild-to-moderate area of basal inferior wall hypokinesis.  This too turned out to be a false positive, as the wall motion was normal on the MRI.      He is feeling well at present.  He has no chest pains, no chest pressure, no unusual shortness of breath and no syncope.        LABORATORY STUDIES:  His lipid profile shows an LDL of 62, HDL of 60 and triglycerides of 87 with a total cholesterol of 139.  His basic metabolic profile was also normal with a sodium of 136, potassium of 3.7, BUN of 24 and a creatinine of 0.97 with GFR 78.  His blood pressure today was normal at 129/86.      IMPRESSION:   1.  Coronary artery disease.  The patient is asymptomatic with respect to coronary artery disease.   2.  Excellent lipid profile, well within secondary prevention guidelines on high-intensity statin therapy (rosuvastatin 20 mg per day).   3.  No further episodes of vasovagal syncope.   4.  Normotensive.      PLAN:  We will continue the patient on his present medications.  I will see the patient back again in 1 year's time but as always, he has been told to contact me if he has any questions or any concerns.      cc:   Doug Seo Jr., [place] [address]         ALFONSO GALLAGHER MD, [facility]             D: 2020   T: 2020   MT: DW      Name:     BRINA PALACIOS   MRN:      [id number]        Account:      [id number]   :      1950           Service Date: 2020      Document: [id number]        Thank you for allowing me to participate in the care of your patient.    Sincerely,     Alfonso Ng MD, MD     [facility]

## 2020-09-30 ENCOUNTER — THERAPY VISIT (OUTPATIENT)
Dept: PHYSICAL THERAPY | Facility: CLINIC | Age: 70
End: 2020-09-30
Payer: MEDICARE

## 2020-09-30 DIAGNOSIS — M25.512 ACUTE PAIN OF LEFT SHOULDER: ICD-10-CM

## 2020-09-30 PROCEDURE — 97110 THERAPEUTIC EXERCISES: CPT | Mod: GP | Performed by: PHYSICAL THERAPIST

## 2020-09-30 PROCEDURE — 97140 MANUAL THERAPY 1/> REGIONS: CPT | Mod: GP | Performed by: PHYSICAL THERAPIST

## 2020-10-14 ENCOUNTER — THERAPY VISIT (OUTPATIENT)
Dept: PHYSICAL THERAPY | Facility: CLINIC | Age: 70
End: 2020-10-14
Payer: MEDICARE

## 2020-10-14 DIAGNOSIS — M25.512 ACUTE PAIN OF LEFT SHOULDER: ICD-10-CM

## 2020-10-14 PROCEDURE — 97110 THERAPEUTIC EXERCISES: CPT | Mod: GP | Performed by: PHYSICAL THERAPIST

## 2020-10-14 PROCEDURE — 97140 MANUAL THERAPY 1/> REGIONS: CPT | Mod: GP | Performed by: PHYSICAL THERAPIST

## 2020-10-21 DIAGNOSIS — A60.02 HERPES SIMPLEX INFECTION OF OTHER SITE OF MALE GENITAL ORGAN: ICD-10-CM

## 2020-10-21 RX ORDER — VALACYCLOVIR HYDROCHLORIDE 500 MG/1
TABLET, FILM COATED ORAL
Qty: 30 TABLET | Refills: 1 | Status: SHIPPED | OUTPATIENT
Start: 2020-10-21

## 2020-10-28 ENCOUNTER — THERAPY VISIT (OUTPATIENT)
Dept: PHYSICAL THERAPY | Facility: CLINIC | Age: 70
End: 2020-10-28
Payer: MEDICARE

## 2020-10-28 DIAGNOSIS — M25.512 ACUTE PAIN OF LEFT SHOULDER: ICD-10-CM

## 2020-10-28 PROCEDURE — 97110 THERAPEUTIC EXERCISES: CPT | Mod: GP | Performed by: PHYSICAL THERAPIST

## 2020-10-28 PROCEDURE — 97140 MANUAL THERAPY 1/> REGIONS: CPT | Mod: GP | Performed by: PHYSICAL THERAPIST

## 2020-11-11 ENCOUNTER — THERAPY VISIT (OUTPATIENT)
Dept: PHYSICAL THERAPY | Facility: CLINIC | Age: 70
End: 2020-11-11
Payer: MEDICARE

## 2020-11-11 DIAGNOSIS — M25.512 ACUTE PAIN OF LEFT SHOULDER: ICD-10-CM

## 2020-11-11 PROCEDURE — 97010 HOT OR COLD PACKS THERAPY: CPT | Mod: GP | Performed by: PHYSICAL THERAPIST

## 2020-11-11 PROCEDURE — 97110 THERAPEUTIC EXERCISES: CPT | Mod: GP | Performed by: PHYSICAL THERAPIST

## 2020-11-11 PROCEDURE — 97140 MANUAL THERAPY 1/> REGIONS: CPT | Mod: GP | Performed by: PHYSICAL THERAPIST

## 2020-11-11 NOTE — LETTER
[facility]  [address] [address] [phone]    2020    Re: Mynor Curry   :   1950  MRN:  [id number]   REFERRING PHYSICIAN:   Lazaro Cobb    [facility]  Date of Initial Evaluation:  2020  Visits:  Rxs Used: 8  Reason for Referral:  Acute pain of left shoulder    DISCHARGE REPORT  Progress reporting period is from 20 to 20 (8 visits).     SUBJECTIVE  Subjective changes noted by patient:   Subjective: Kayaked and had about 50% less shoulder pain than previously.  Feels he has improved his ROM as he has been working on his stretches consistently .    Current pain level is NA  .     Previous pain level was   Initial Pain level: 10.   Changes in function:  Yes (See Goal flowsheet attached for changes in current functional level)  Adverse reaction to treatment or activity: None  OBJECTIVE  Changes noted in objective findings:    Objective: PROM: IR=36.  Slight pain with IR,ER, flex.  No pain with abd and EC.     ASSESSMENT/PLAN  Updated problem list and treatment plan: Diagnosis 1:  Left shoulder impingement with RTC tendonitis    STG/LTGs have been met or progress has been made towards goals:  Yes (See Goal flow sheet completed today.)  Assessment of Progress: The patient's condition is improving.  Self Management Plans:  Patient has been instructed in a home treatment program.  Mynor continues to require the following intervention to meet STG and LTG's:  PT intervention is no longer required to meet STG/LTG.  Recommendations:  This patient is ready to be discharged from therapy and continue their home treatment program.    Thank you for your referral.    INQUIRIES  Therapist: Diego Hobson PT  [facility]  [address] [address]  Phone: [phone]  Fax: [phone]

## 2020-11-11 NOTE — PROGRESS NOTES
Subjective:  HPI  Physical Exam                    Objective:  System    Physical Exam    General     ROS    Assessment/Plan:    DISCHARGE REPORT    Progress reporting period is from 8/24/20 to 11/11/20 (8 visits).       SUBJECTIVE  Subjective changes noted by patient:   Subjective: Samanthaked and had about 50% less shoulder pain than previously.  Feels he has improved his ROM as he has been working on his stretches consistently .    Current pain level is NA  .     Previous pain level was   Initial Pain level: 7/10.   Changes in function:  Yes (See Goal flowsheet attached for changes in current functional level)  Adverse reaction to treatment or activity: None    OBJECTIVE  Changes noted in objective findings:    Objective: PROM: IR=36.  Slight pain with IR,ER, flex.  No pain with abd and EC.       ASSESSMENT/PLAN  Updated problem list and treatment plan: Diagnosis 1:  Left shoulder impingement with RTC tendonitis    STG/LTGs have been met or progress has been made towards goals:  Yes (See Goal flow sheet completed today.)  Assessment of Progress: The patient's condition is improving.  Self Management Plans:  Patient has been instructed in a home treatment program.    Mynor continues to require the following intervention to meet STG and LTG's:  PT intervention is no longer required to meet STG/LTG.    Recommendations:  This patient is ready to be discharged from therapy and continue their home treatment program.    Please refer to the daily flowsheet for treatment today, total treatment time and time spent performing 1:1 timed codes.

## 2020-11-13 ENCOUNTER — TRANSFERRED RECORDS (OUTPATIENT)
Dept: HEALTH INFORMATION MANAGEMENT | Facility: CLINIC | Age: 70
End: 2020-11-13

## 2020-11-17 ENCOUNTER — TRANSFERRED RECORDS (OUTPATIENT)
Dept: HEALTH INFORMATION MANAGEMENT | Facility: CLINIC | Age: 70
End: 2020-11-17

## 2020-11-30 DIAGNOSIS — I10 ESSENTIAL HYPERTENSION WITH GOAL BLOOD PRESSURE LESS THAN 140/90: ICD-10-CM

## 2020-11-30 DIAGNOSIS — E78.5 HYPERLIPIDEMIA LDL GOAL <130: ICD-10-CM

## 2020-11-30 RX ORDER — LOSARTAN POTASSIUM AND HYDROCHLOROTHIAZIDE 12.5; 1 MG/1; MG/1
1 TABLET ORAL DAILY
Qty: 90 TABLET | Refills: 3 | Status: SHIPPED | OUTPATIENT
Start: 2020-11-30

## 2020-11-30 RX ORDER — ROSUVASTATIN CALCIUM 20 MG/1
TABLET, COATED ORAL
Qty: 90 TABLET | Refills: 3 | Status: SHIPPED | OUTPATIENT
Start: 2020-11-30 | End: 2021-11-19

## 2020-12-15 ENCOUNTER — TRANSFERRED RECORDS (OUTPATIENT)
Dept: HEALTH INFORMATION MANAGEMENT | Facility: CLINIC | Age: 70
End: 2020-12-15

## 2021-03-02 ENCOUNTER — RECORDS - HEALTHEAST (OUTPATIENT)
Dept: ADMINISTRATIVE | Facility: OTHER | Age: 71
End: 2021-03-02

## 2021-04-18 ENCOUNTER — HEALTH MAINTENANCE LETTER (OUTPATIENT)
Age: 71
End: 2021-04-18

## 2021-06-14 ENCOUNTER — TRANSFERRED RECORDS (OUTPATIENT)
Dept: HEALTH INFORMATION MANAGEMENT | Facility: CLINIC | Age: 71
End: 2021-06-14

## 2021-08-03 ENCOUNTER — APPOINTMENT (OUTPATIENT)
Dept: CT IMAGING | Facility: CLINIC | Age: 71
End: 2021-08-03
Attending: PHYSICIAN ASSISTANT
Payer: MEDICARE

## 2021-08-03 ENCOUNTER — HOSPITAL ENCOUNTER (EMERGENCY)
Facility: CLINIC | Age: 71
Discharge: HOME | End: 2021-08-03
Attending: PHYSICIAN ASSISTANT | Admitting: PHYSICIAN ASSISTANT
Payer: MEDICARE

## 2021-08-03 VITALS
BODY MASS INDEX: 25.09 KG/M2 | HEART RATE: 56 BPM | WEIGHT: 165 LBS | TEMPERATURE: 97.6 F | SYSTOLIC BLOOD PRESSURE: 141 MMHG | RESPIRATION RATE: 16 BRPM | DIASTOLIC BLOOD PRESSURE: 72 MMHG | OXYGEN SATURATION: 100 %

## 2021-08-03 DIAGNOSIS — K57.32 DIVERTICULITIS OF COLON: ICD-10-CM

## 2021-08-03 LAB
ALBUMIN UR-MCNC: NEGATIVE MG/DL
ANION GAP SERPL CALCULATED.3IONS-SCNC: 5 MMOL/L (ref 3–14)
APPEARANCE UR: CLEAR
BASOPHILS # BLD AUTO: 0 10E3/UL (ref 0–0.2)
BASOPHILS NFR BLD AUTO: 0 %
BILIRUB UR QL STRIP: NEGATIVE
BUN SERPL-MCNC: 18 MG/DL (ref 7–30)
CALCIUM SERPL-MCNC: 9.6 MG/DL (ref 8.5–10.1)
CHLORIDE BLD-SCNC: 105 MMOL/L (ref 94–109)
CO2 SERPL-SCNC: 27 MMOL/L (ref 20–32)
COLOR UR AUTO: ABNORMAL
CREAT SERPL-MCNC: 0.78 MG/DL (ref 0.66–1.25)
EOSINOPHIL # BLD AUTO: 0.1 10E3/UL (ref 0–0.7)
EOSINOPHIL NFR BLD AUTO: 1 %
ERYTHROCYTE [DISTWIDTH] IN BLOOD BY AUTOMATED COUNT: 12.3 % (ref 10–15)
GFR SERPL CREATININE-BSD FRML MDRD: 90 ML/MIN/1.73M2
GLUCOSE BLD-MCNC: 98 MG/DL (ref 70–99)
GLUCOSE UR STRIP-MCNC: NEGATIVE MG/DL
HCT VFR BLD AUTO: 40.2 % (ref 40–53)
HGB BLD-MCNC: 13.3 G/DL (ref 13.3–17.7)
HGB UR QL STRIP: NEGATIVE
HOLD SPECIMEN: NORMAL
HOLD SPECIMEN: NORMAL
IMM GRANULOCYTES # BLD: 0 10E3/UL
IMM GRANULOCYTES NFR BLD: 0 %
KETONES UR STRIP-MCNC: NEGATIVE MG/DL
LEUKOCYTE ESTERASE UR QL STRIP: NEGATIVE
LYMPHOCYTES # BLD AUTO: 1.6 10E3/UL (ref 0.8–5.3)
LYMPHOCYTES NFR BLD AUTO: 17 %
MCH RBC QN AUTO: 32.9 PG (ref 26.5–33)
MCHC RBC AUTO-ENTMCNC: 33.1 G/DL (ref 31.5–36.5)
MCV RBC AUTO: 100 FL (ref 78–100)
MONOCYTES # BLD AUTO: 0.9 10E3/UL (ref 0–1.3)
MONOCYTES NFR BLD AUTO: 9 %
MUCOUS THREADS #/AREA URNS LPF: PRESENT /LPF
NEUTROPHILS # BLD AUTO: 7.2 10E3/UL (ref 1.6–8.3)
NEUTROPHILS NFR BLD AUTO: 73 %
NITRATE UR QL: NEGATIVE
NRBC # BLD AUTO: 0 10E3/UL
NRBC BLD AUTO-RTO: 0 /100
PH UR STRIP: 6 [PH] (ref 5–7)
PLATELET # BLD AUTO: 257 10E3/UL (ref 150–450)
POTASSIUM BLD-SCNC: 3.6 MMOL/L (ref 3.4–5.3)
RBC # BLD AUTO: 4.04 10E6/UL (ref 4.4–5.9)
RBC URINE: 1 /HPF
SODIUM SERPL-SCNC: 137 MMOL/L (ref 133–144)
SP GR UR STRIP: 1.03 (ref 1–1.03)
UROBILINOGEN UR STRIP-MCNC: NORMAL MG/DL
WBC # BLD AUTO: 9.9 10E3/UL (ref 4–11)
WBC URINE: <1 /HPF

## 2021-08-03 PROCEDURE — 250N000011 HC RX IP 250 OP 636: Performed by: PHYSICIAN ASSISTANT

## 2021-08-03 PROCEDURE — 250N000009 HC RX 250: Performed by: PHYSICIAN ASSISTANT

## 2021-08-03 PROCEDURE — 81001 URINALYSIS AUTO W/SCOPE: CPT | Performed by: EMERGENCY MEDICINE

## 2021-08-03 PROCEDURE — 85025 COMPLETE CBC W/AUTO DIFF WBC: CPT | Performed by: PHYSICIAN ASSISTANT

## 2021-08-03 PROCEDURE — 250N000013 HC RX MED GY IP 250 OP 250 PS 637: Performed by: PHYSICIAN ASSISTANT

## 2021-08-03 PROCEDURE — 99285 EMERGENCY DEPT VISIT HI MDM: CPT | Mod: 25

## 2021-08-03 PROCEDURE — 96374 THER/PROPH/DIAG INJ IV PUSH: CPT | Mod: 59

## 2021-08-03 PROCEDURE — 85025 COMPLETE CBC W/AUTO DIFF WBC: CPT | Performed by: EMERGENCY MEDICINE

## 2021-08-03 PROCEDURE — 80048 BASIC METABOLIC PNL TOTAL CA: CPT | Performed by: EMERGENCY MEDICINE

## 2021-08-03 PROCEDURE — 74177 CT ABD & PELVIS W/CONTRAST: CPT | Mod: MG

## 2021-08-03 PROCEDURE — 80048 BASIC METABOLIC PNL TOTAL CA: CPT | Performed by: PHYSICIAN ASSISTANT

## 2021-08-03 PROCEDURE — 36415 COLL VENOUS BLD VENIPUNCTURE: CPT | Performed by: EMERGENCY MEDICINE

## 2021-08-03 RX ORDER — METRONIDAZOLE 500 MG/1
500 TABLET ORAL 3 TIMES DAILY
Qty: 30 TABLET | Refills: 0 | Status: SHIPPED | OUTPATIENT
Start: 2021-08-03 | End: 2021-11-19

## 2021-08-03 RX ORDER — CIPROFLOXACIN 500 MG/1
500 TABLET, FILM COATED ORAL 2 TIMES DAILY
Qty: 20 TABLET | Refills: 0 | Status: SHIPPED | OUTPATIENT
Start: 2021-08-03 | End: 2021-11-19

## 2021-08-03 RX ORDER — ONDANSETRON 2 MG/ML
4 INJECTION INTRAMUSCULAR; INTRAVENOUS ONCE
Status: COMPLETED | OUTPATIENT
Start: 2021-08-03 | End: 2021-08-03

## 2021-08-03 RX ORDER — CIPROFLOXACIN 500 MG/1
500 TABLET, FILM COATED ORAL 2 TIMES DAILY
Qty: 20 TABLET | Refills: 0 | Status: SHIPPED | OUTPATIENT
Start: 2021-08-03 | End: 2021-08-03

## 2021-08-03 RX ORDER — ACETAMINOPHEN 325 MG/1
975 TABLET ORAL ONCE
Status: COMPLETED | OUTPATIENT
Start: 2021-08-03 | End: 2021-08-03

## 2021-08-03 RX ORDER — IOPAMIDOL 755 MG/ML
500 INJECTION, SOLUTION INTRAVASCULAR ONCE
Status: COMPLETED | OUTPATIENT
Start: 2021-08-03 | End: 2021-08-03

## 2021-08-03 RX ORDER — METRONIDAZOLE 500 MG/1
500 TABLET ORAL 3 TIMES DAILY
Qty: 30 TABLET | Refills: 0 | Status: SHIPPED | OUTPATIENT
Start: 2021-08-03 | End: 2021-08-03

## 2021-08-03 RX ORDER — ONDANSETRON 4 MG/1
4 TABLET, ORALLY DISINTEGRATING ORAL EVERY 8 HOURS PRN
Qty: 6 TABLET | Refills: 0 | Status: SHIPPED | OUTPATIENT
Start: 2021-08-03 | End: 2021-08-14

## 2021-08-03 RX ORDER — ONDANSETRON 4 MG/1
4 TABLET, ORALLY DISINTEGRATING ORAL EVERY 8 HOURS PRN
Qty: 6 TABLET | Refills: 0 | Status: SHIPPED | OUTPATIENT
Start: 2021-08-03 | End: 2021-08-03

## 2021-08-03 RX ADMIN — ACETAMINOPHEN 975 MG: 325 TABLET, FILM COATED ORAL at 11:53

## 2021-08-03 RX ADMIN — SODIUM CHLORIDE 61 ML: 9 INJECTION, SOLUTION INTRAVENOUS at 11:30

## 2021-08-03 RX ADMIN — IOPAMIDOL 83 ML: 755 INJECTION, SOLUTION INTRAVENOUS at 11:30

## 2021-08-03 RX ADMIN — ONDANSETRON 4 MG: 2 INJECTION INTRAMUSCULAR; INTRAVENOUS at 11:56

## 2021-08-03 ASSESSMENT — ENCOUNTER SYMPTOMS
CHILLS: 0
SHORTNESS OF BREATH: 0
DIARRHEA: 0
DYSURIA: 0
COUGH: 0
BLOOD IN STOOL: 0
HEMATURIA: 0
ABDOMINAL PAIN: 1
FREQUENCY: 0
FEVER: 0
NAUSEA: 1

## 2021-08-03 NOTE — ED TRIAGE NOTES
A&O x4, ABCs intact. Pt presents with lower left abdominal pain that started yesterday evening around dinner time. Pt reports mild nausea, denies constipation, bloody stools or mucous in stools.

## 2021-08-03 NOTE — ED PROVIDER NOTES
History   Chief Complaint:  Abdominal Pain    The history is provided by the patient.      Mynor Curry is a 70 year old male with history of coronary artery disease, hyperlipidemia, hypertension, and cardiomyopathy who presents with abdominal pain. The patient reports that starting last night during dinner, he developed left lower quadrant abdominal pain. He says his pain is constant, and exacerbated by changing positions with mild, associated nausea. This has never happened before. The patient denies any testicular pain, frequency, urgency, dysuria, hematuria, fever, chills, cough, diarrhea, blood in stool, shortness of breath, or chest pain, or history of abdominal surgeries. Mynor recounts a colonoscopy about ten years ago which found diverticulosis, but no polyps. Took ibuprofen this morning for his pain. No additional medical concerns expressed.     Review of Systems   Constitutional: Negative for chills and fever.   Respiratory: Negative for cough and shortness of breath.    Cardiovascular: Negative for chest pain.   Gastrointestinal: Positive for abdominal pain and nausea. Negative for blood in stool and diarrhea.   Genitourinary: Negative for dysuria, frequency, hematuria, testicular pain and urgency.   All other systems reviewed and are negative.    Allergies:  Augmentin    Medications:  Aspirin 81 mg  Neurontin  Advil  Hyzaar  Prilosec  Crestor   Viagra  Valtrex     Past Medical History:    Arthritis  CAD  Erectile dysfunction   Hyperlipidemia  Hypertension  Syncope  Cardiomyopathy   Non-nodular prostatic hyperplasia   Herpes  Spinal stenosis of lumbar region without neurogenic claudication     Past Surgical History:    Arthroplasty hip anterior  arthroscopy knee with medial meniscectomy; left  Arthroscopy shoulder, right  Colonoscopy  Hemilaminectomy, discectomy lumbar one level, combined  Hernia repair, inguinal, bilateral  orthopedic surgery     Family History:    Mother: arthritis,  hypertension  Father: arthritis, hypertensions, atrial fibrillation     Social History:  The patient presents to the ED with his wife.     Physical Exam     Patient Vitals for the past 24 hrs:   BP Temp Temp src Pulse Resp SpO2 Weight   08/03/21 1212 (!) 141/72 97.6  F (36.4  C) Oral 56 16 100 % --   08/03/21 0921 (!) 139/97 -- -- -- -- -- --   08/03/21 0919 -- (!) 96.7  F (35.9  C) Temporal 59 14 100 % 74.8 kg (165 lb)       Physical Exam  Vitals signs and nursing note reviewed.   HENT:      Nose: Nose normal. No congestion or rhinorrhea.   Eyes:      General: No scleral icterus.     Extraocular Movements: Extraocular movements intact.  Cardiovascular:      Rate and Rhythm: Regular rhythm. Normal Rate.     Pulses: Normal pulses.      Heart sounds: Normal heart sounds.   Pulmonary:      Effort: Pulmonary effort is normal.      Breath sounds: Normal breath sounds.   Abdominal:      General: Abdomen is flat. Bowel sounds are normal.      Palpations: Abdomen is soft.      Tenderness: Left lower quadrant pain.   Musculoskeletal: Normal range of motion.   Skin:     General: Skin is warm and dry. No lesions or open areas on exposed skin.   Neurological:      Mental Status: Alert. Speech normal. Responds appropriately to questions.   Psychiatric:         Mood and Affect: Mood normal.         Behavior: Behavior normal.       Emergency Department Course     Imaging:  CT abdomen pelvis with contrast  1.  Findings most consistent with acute diverticulitis involving the  mid distal descending colon.  As per radiology.     Laboratory:  CBC: WBC 9.9, HGB 13.3,     BMP: WNL (Creatinine 0.78)     UA with microscopic: mucus: present o/w WNL    Emergency Department Course:    Reviewed:  I reviewed nursing notes, vitals, past medical history and care everywhere    Assessments:  1106 I obtained history and examined the patient as noted above.   1206 I rechecked the patient and explained findings. We discussed admission versus  discharge, and the patient elected for discharge, which I believe is reasonable. I recommended he follow up with his primary care provider.     Interventions:  1153 Tylenol 975 mg PO  1156 Zofran 4 mg IV     Disposition:  The patient was discharged to home.       Impression & Plan     Medical Decision Making:   Mynor Curry is a 70 year old male to the emergency department for the evaluation of abdominal pain.  See HPI for additional details.  Vitals are reviewed.  Patient afebrile and hemodynamically stable.  On physical exam, the patient had left lower quadrant point tenderness.  Remaining abdominal exam unremarkable.  Labs were obtained and reviewed.  CBC demonstrates no evidence of leukocytosis.  Hemoglobin stable at 13.3.  BMP unremarkable.  UA negative for acute infection or hematuria.  CT scan was obtained and revealed diverticulitis without abscess or perforation.  At this time there is no sign of sepsis, shock, or bacteremia.  Results including incidental findings were discussed with the patient.  He verbalized understanding.  While in the emergency department, the patient was given Tylenol and a one-time dose of Zofran.  On reassessment, he noted improvement in his presenting symptoms.  I spoke with the patient regarding admission for continued evaluation versus discharging home.  The patient elected to discharge home which I feel is appropriate given overall well appearance and reassuring vitals/labs.  The patient was advised to follow-up with his primary care provider in 1 to 2 days for reassessment.  Strict return precautions were discussed.  All questions and concerns were addressed prior to discharge.    Covid-19  Mynor Curry was evaluated during a global COVID-19 pandemic, which necessitated consideration that the patient might be at risk for infection with the SARS-CoV-2 virus that causes COVID-19.   Applicable protocols for evaluation were followed during the patient's care.     Diagnosis:     ICD-10-CM    1. Diverticulitis of colon  K57.32        Discharge Medications:  New Prescriptions    CIPROFLOXACIN (CIPRO) 500 MG TABLET    Take 1 tablet (500 mg) by mouth 2 times daily for 10 days    METRONIDAZOLE (FLAGYL) 500 MG TABLET    Take 1 tablet (500 mg) by mouth 3 times daily for 10 days       Scribe Disclosure:  Eddi ENGLE, am serving as a scribe at 11:06 AM on 8/3/2021 to document services personally performed by Fatoumata Puckett PA-C based on my observations and the provider's statements to me.        Fatoumata Puckett PA-C  08/03/21 1232       Fatoumata Puckett PA-C  08/03/21 1244

## 2021-08-03 NOTE — DISCHARGE INSTRUCTIONS
Please review attached instructions.  Begin antibiotic therapy today and ensure for completion.  Follow-up with your primary care provider by the end of the week for reassessment.  Return the emergency department if you develop fever, worsening discomfort, to tolerate antibiotics, or any other medical concerns.

## 2021-08-12 ENCOUNTER — TRANSFERRED RECORDS (OUTPATIENT)
Dept: HEALTH INFORMATION MANAGEMENT | Facility: CLINIC | Age: 71
End: 2021-08-12

## 2021-08-14 ENCOUNTER — APPOINTMENT (OUTPATIENT)
Dept: CT IMAGING | Facility: CLINIC | Age: 71
End: 2021-08-14
Attending: EMERGENCY MEDICINE
Payer: MEDICARE

## 2021-08-14 ENCOUNTER — HOSPITAL ENCOUNTER (EMERGENCY)
Facility: CLINIC | Age: 71
Discharge: HOME | End: 2021-08-14
Attending: EMERGENCY MEDICINE | Admitting: EMERGENCY MEDICINE
Payer: MEDICARE

## 2021-08-14 VITALS
HEART RATE: 66 BPM | TEMPERATURE: 97.7 F | RESPIRATION RATE: 18 BRPM | OXYGEN SATURATION: 96 % | DIASTOLIC BLOOD PRESSURE: 73 MMHG | SYSTOLIC BLOOD PRESSURE: 143 MMHG

## 2021-08-14 DIAGNOSIS — K57.32 DIVERTICULITIS OF COLON: ICD-10-CM

## 2021-08-14 LAB
ANION GAP SERPL CALCULATED.3IONS-SCNC: 5 MMOL/L (ref 3–14)
BASOPHILS # BLD AUTO: 0 10E3/UL (ref 0–0.2)
BASOPHILS NFR BLD AUTO: 0 %
BUN SERPL-MCNC: 18 MG/DL (ref 7–30)
CALCIUM SERPL-MCNC: 9 MG/DL (ref 8.5–10.1)
CHLORIDE BLD-SCNC: 109 MMOL/L (ref 94–109)
CO2 SERPL-SCNC: 26 MMOL/L (ref 20–32)
CREAT SERPL-MCNC: 0.81 MG/DL (ref 0.66–1.25)
EOSINOPHIL # BLD AUTO: 0 10E3/UL (ref 0–0.7)
EOSINOPHIL NFR BLD AUTO: 0 %
ERYTHROCYTE [DISTWIDTH] IN BLOOD BY AUTOMATED COUNT: 12.3 % (ref 10–15)
GFR SERPL CREATININE-BSD FRML MDRD: 89 ML/MIN/1.73M2
GLUCOSE BLD-MCNC: 110 MG/DL (ref 70–99)
HCT VFR BLD AUTO: 39 % (ref 40–53)
HGB BLD-MCNC: 12.8 G/DL (ref 13.3–17.7)
IMM GRANULOCYTES # BLD: 0.1 10E3/UL
IMM GRANULOCYTES NFR BLD: 1 %
LYMPHOCYTES # BLD AUTO: 1.8 10E3/UL (ref 0.8–5.3)
LYMPHOCYTES NFR BLD AUTO: 12 %
MCH RBC QN AUTO: 33.4 PG (ref 26.5–33)
MCHC RBC AUTO-ENTMCNC: 32.8 G/DL (ref 31.5–36.5)
MCV RBC AUTO: 102 FL (ref 78–100)
MONOCYTES # BLD AUTO: 0.9 10E3/UL (ref 0–1.3)
MONOCYTES NFR BLD AUTO: 6 %
NEUTROPHILS # BLD AUTO: 12.6 10E3/UL (ref 1.6–8.3)
NEUTROPHILS NFR BLD AUTO: 81 %
NRBC # BLD AUTO: 0 10E3/UL
NRBC BLD AUTO-RTO: 0 /100
PLATELET # BLD AUTO: 301 10E3/UL (ref 150–450)
POTASSIUM BLD-SCNC: 3.5 MMOL/L (ref 3.4–5.3)
RBC # BLD AUTO: 3.83 10E6/UL (ref 4.4–5.9)
SODIUM SERPL-SCNC: 140 MMOL/L (ref 133–144)
WBC # BLD AUTO: 15.3 10E3/UL (ref 4–11)

## 2021-08-14 PROCEDURE — 96375 TX/PRO/DX INJ NEW DRUG ADDON: CPT

## 2021-08-14 PROCEDURE — 250N000011 HC RX IP 250 OP 636: Performed by: EMERGENCY MEDICINE

## 2021-08-14 PROCEDURE — 85025 COMPLETE CBC W/AUTO DIFF WBC: CPT | Performed by: EMERGENCY MEDICINE

## 2021-08-14 PROCEDURE — 99285 EMERGENCY DEPT VISIT HI MDM: CPT | Mod: 25

## 2021-08-14 PROCEDURE — 250N000013 HC RX MED GY IP 250 OP 250 PS 637: Performed by: EMERGENCY MEDICINE

## 2021-08-14 PROCEDURE — 96374 THER/PROPH/DIAG INJ IV PUSH: CPT | Mod: 59

## 2021-08-14 PROCEDURE — 74177 CT ABD & PELVIS W/CONTRAST: CPT | Mod: ME

## 2021-08-14 PROCEDURE — 36415 COLL VENOUS BLD VENIPUNCTURE: CPT | Performed by: EMERGENCY MEDICINE

## 2021-08-14 PROCEDURE — 258N000003 HC RX IP 258 OP 636: Performed by: EMERGENCY MEDICINE

## 2021-08-14 PROCEDURE — 80048 BASIC METABOLIC PNL TOTAL CA: CPT | Performed by: EMERGENCY MEDICINE

## 2021-08-14 PROCEDURE — 250N000009 HC RX 250: Performed by: EMERGENCY MEDICINE

## 2021-08-14 PROCEDURE — 96361 HYDRATE IV INFUSION ADD-ON: CPT

## 2021-08-14 PROCEDURE — 96376 TX/PRO/DX INJ SAME DRUG ADON: CPT

## 2021-08-14 RX ORDER — ONDANSETRON 4 MG/1
4 TABLET, ORALLY DISINTEGRATING ORAL EVERY 8 HOURS PRN
Qty: 10 TABLET | Refills: 0 | Status: SHIPPED | OUTPATIENT
Start: 2021-08-14 | End: 2021-11-19

## 2021-08-14 RX ORDER — IOPAMIDOL 755 MG/ML
500 INJECTION, SOLUTION INTRAVASCULAR ONCE
Status: COMPLETED | OUTPATIENT
Start: 2021-08-14 | End: 2021-08-14

## 2021-08-14 RX ORDER — HYDROMORPHONE HYDROCHLORIDE 1 MG/ML
0.5 INJECTION, SOLUTION INTRAMUSCULAR; INTRAVENOUS; SUBCUTANEOUS
Status: COMPLETED | OUTPATIENT
Start: 2021-08-14 | End: 2021-08-14

## 2021-08-14 RX ORDER — SODIUM CHLORIDE 9 MG/ML
INJECTION, SOLUTION INTRAVENOUS CONTINUOUS
Status: DISCONTINUED | OUTPATIENT
Start: 2021-08-14 | End: 2021-08-14 | Stop reason: HOSPADM

## 2021-08-14 RX ORDER — ONDANSETRON 2 MG/ML
4 INJECTION INTRAMUSCULAR; INTRAVENOUS ONCE
Status: COMPLETED | OUTPATIENT
Start: 2021-08-14 | End: 2021-08-14

## 2021-08-14 RX ADMIN — HYDROMORPHONE HYDROCHLORIDE 0.5 MG: 1 INJECTION, SOLUTION INTRAMUSCULAR; INTRAVENOUS; SUBCUTANEOUS at 20:56

## 2021-08-14 RX ADMIN — ONDANSETRON 4 MG: 2 INJECTION INTRAMUSCULAR; INTRAVENOUS at 20:55

## 2021-08-14 RX ADMIN — IOPAMIDOL 90 ML: 755 INJECTION, SOLUTION INTRAVENOUS at 19:25

## 2021-08-14 RX ADMIN — HYDROMORPHONE HYDROCHLORIDE 0.5 MG: 1 INJECTION, SOLUTION INTRAMUSCULAR; INTRAVENOUS; SUBCUTANEOUS at 19:42

## 2021-08-14 RX ADMIN — SODIUM CHLORIDE 100 ML: 9 INJECTION, SOLUTION INTRAVENOUS at 19:25

## 2021-08-14 RX ADMIN — SODIUM CHLORIDE 1000 ML: 9 INJECTION, SOLUTION INTRAVENOUS at 18:46

## 2021-08-14 RX ADMIN — HYDROMORPHONE HYDROCHLORIDE 0.5 MG: 1 INJECTION, SOLUTION INTRAMUSCULAR; INTRAVENOUS; SUBCUTANEOUS at 18:46

## 2021-08-14 RX ADMIN — AMOXICILLIN AND CLAVULANATE POTASSIUM 1 TABLET: 875; 125 TABLET, FILM COATED ORAL at 20:57

## 2021-08-14 ASSESSMENT — ENCOUNTER SYMPTOMS
VOMITING: 0
DIARRHEA: 1
BLOOD IN STOOL: 1
FEVER: 0
ABDOMINAL PAIN: 1
NAUSEA: 0

## 2021-08-14 NOTE — ED PROVIDER NOTES
History   Chief Complaint:  Abdominal Pain     HPI   Mynor Curry is a 70 year old male who was diagnosed with diverticulitis on 8/3/21 and completed a course of Cipro and Flagyl who presents for evaluation of abdominal pain. The patient reports that he had resolution of his abdominal pain after completing the Cipro and Flagyl, however today he started to develop recurrent left lower quadrant abdominal pain that has been worsening throughout the day, prompting him to come to the ED for evaluation. He feels that his pain today is more severe than what he experienced prior to being diagnosed with diverticulitis. He notes that he had some diarrhea shortly after starting the antibiotics with some blood in his stool, but this has been improving over time and he has not had further bleeding over the last few days. He has not had any fever, nausea, or vomiting.     CT abdomen pelvis with contrast 8/3/2021:  1.  Findings most consistent with acute diverticulitis involving the mid distal descending colon.    Review of Systems   Constitutional: Negative for fever.   Gastrointestinal: Positive for abdominal pain, blood in stool and diarrhea. Negative for nausea and vomiting.   All other systems reviewed and are negative.    Allergies:  Augmentin      Medications:  Aspirin   Gabapentin   Losartan-hydrochlorothiazide   Omeprazole   Zofran ODT   Crestor   Valtrex     Past Medical History:    Arthritis  CAD   ED   Hypertension  Hyperlipidemia   Diverticulitis   Cardiomyopathy     Past Surgical History:    Arthroplasty hip anterior   Arthroscopy knee with medial meniscectomy left  Right shoulder arthroscopy  Colonoscopy  Hemilaminectomy, discectomy lumbar one level combined   Hernia repair inguinal bilateral  Orthopedic surgery      Family History:    Arthritis - Mother and father   Hypertension - Mother and father   Atrial fibrillation - Father     Social History:  Marital status:   The patient presents to the ED  accompanied by his wife.     Physical Exam     Patient Vitals for the past 24 hrs:   BP Temp Temp src Pulse Resp SpO2   08/14/21 1900 (!) 126/98 -- -- 60 -- 99 %   08/14/21 1845 (!) 143/82 -- -- 57 -- 99 %   08/14/21 1823 (!) 150/75 97.7  F (36.5  C) Oral -- 18 100 %   08/14/21 1820 -- -- -- -- -- 100 %   08/14/21 1819 (!) 150/75 -- -- 58 -- --   08/14/21 1539 (!) 155/94 98.1  F (36.7  C) Temporal 70 18 99 %       Physical Exam  General: Patient is alert and interactive when I enter the room  Head:  The scalp, face, and head appear normal  Eyes:  Conjunctivae are normal  ENT:    The nose is normal    Pinnae are normal    External acoustic canals are normal  Neck:  Trachea midline  CV:  Pulses are normal  Resp:  No respiratory distress   Abdomen:      Soft, LLQ tenderness, non-distended  Musc:  Normal muscular tone    No major joint effusions    No asymmetric leg swelling  Skin:  No rash or lesions noted  Neuro:  Speech is normal and fluent. Face is symmetric.     Moving all extremities well.   Psych: Awake. Alert.  Normal affect.  Appropriate interactions.     Emergency Department Course     Imaging:  CT Abdomen Pelvis w Contrast:  IMPRESSION:   1.  Improving colonic inflammatory focus along sigmoid colon. Mild right colonic inflammation persists and completely exclude recurrent focus of active diverticulitis at the same location. There is no new abnormality.  Per radiology.       Laboratory:   CBC: WBC 15.3 high, HGB 12.8 low,   BMP: Glucose 110 high, o/w WNL (Creatinine 0.81)      Emergency Department Course:  Reviewed:  I reviewed nursing notes, vitals and past medical history    Assessments:  1828: I obtained history and examined the patient as noted above.     2029: I updated and reassessed the patient.      Interventions:  1846 NS 1,000 mL IV   1846 Dilaudid 0.5 mg IV   1942 Dilaudid 0.5 mg IV      Disposition:  The patient was discharged to home.      Impression & Plan     Medical Decision  Making:  Mynor Curry is a 70 year old male with a history of diverticulitis who presents with abdominal pain.  Patient was recently seen for diverticulitis and completed his course of antibiotics but had return of his pain which caused him to be concerned.  He does have a mild leukocytosis of 15 and we did do a repeat CAT scan which revealed improvement of his diverticulitis although some still residual inflammation.  Patient appears well and I do not think has a surgical abdomen and with a negative CT for perforation or abscess I think he can go home with primary care follow-up.  He may need to see a colorectal surgeon as an outpatient.  Patient will be changed to Augmentin based on his preference as he had a lot of diarrhea with his last course.  Return precautions given.  Patient discharged.       Diagnosis:    ICD-10-CM    1. Diverticulitis of colon  K57.32       Discharge Medications:  New Prescriptions    AMOXICILLIN-CLAVULANATE (AUGMENTIN) 875-125 MG TABLET    Take 1 tablet by mouth 3 times daily for 7 days    ONDANSETRON (ZOFRAN ODT) 4 MG ODT TAB    Take 1 tablet (4 mg) by mouth every 8 hours as needed for nausea         Scribe Disclosure:  I, Isaac Roberson, am serving as a scribe at 6:28 PM on 8/14/2021 to document services personally performed by Chelsey Seo MD based on my observations and the provider's statements to me.           hCelsey Seo MD  08/15/21 2985

## 2021-08-14 NOTE — ED TRIAGE NOTES
Diagnosed with diverticulitis on the 3rd. Patient was improving with treatment. Now feeling like another episode coming on fast. LLQ pain.

## 2021-09-03 ENCOUNTER — TELEPHONE (OUTPATIENT)
Dept: CARDIOLOGY | Facility: CLINIC | Age: 71
End: 2021-09-03

## 2021-09-03 NOTE — TELEPHONE ENCOUNTER
----- Message from Heather Green sent at 8/25/2021  2:20 PM CDT -----  Regarding: please fax lab  Pt states he is already scheduled for labs at [facility] on 11/1/21 asking for us to send our labs there at [facility].    Thank you,  Leigha

## 2021-10-03 ENCOUNTER — HEALTH MAINTENANCE LETTER (OUTPATIENT)
Age: 71
End: 2021-10-03

## 2021-11-12 ENCOUNTER — TRANSFERRED RECORDS (OUTPATIENT)
Dept: HEALTH INFORMATION MANAGEMENT | Facility: CLINIC | Age: 71
End: 2021-11-12

## 2021-11-19 ENCOUNTER — OFFICE VISIT (OUTPATIENT)
Dept: CARDIOLOGY | Facility: CLINIC | Age: 71
End: 2021-11-19
Payer: MEDICARE

## 2021-11-19 ENCOUNTER — TELEPHONE (OUTPATIENT)
Dept: CARDIOLOGY | Facility: CLINIC | Age: 71
End: 2021-11-19

## 2021-11-19 VITALS
DIASTOLIC BLOOD PRESSURE: 64 MMHG | HEIGHT: 68 IN | WEIGHT: 172.3 LBS | BODY MASS INDEX: 26.11 KG/M2 | OXYGEN SATURATION: 99 % | SYSTOLIC BLOOD PRESSURE: 128 MMHG | HEART RATE: 65 BPM

## 2021-11-19 DIAGNOSIS — E78.00 PURE HYPERCHOLESTEROLEMIA: ICD-10-CM

## 2021-11-19 DIAGNOSIS — E78.5 HYPERLIPIDEMIA LDL GOAL <130: ICD-10-CM

## 2021-11-19 DIAGNOSIS — I10 ESSENTIAL HYPERTENSION WITH GOAL BLOOD PRESSURE LESS THAN 140/90: Primary | ICD-10-CM

## 2021-11-19 DIAGNOSIS — I25.810 CORONARY ARTERY DISEASE INVOLVING CORONARY BYPASS GRAFT OF NATIVE HEART WITHOUT ANGINA PECTORIS: ICD-10-CM

## 2021-11-19 PROCEDURE — 99214 OFFICE O/P EST MOD 30 MIN: CPT | Performed by: INTERNAL MEDICINE

## 2021-11-19 RX ORDER — ROSUVASTATIN CALCIUM 40 MG/1
TABLET, COATED ORAL
Qty: 90 TABLET | Refills: 3 | Status: SHIPPED | OUTPATIENT
Start: 2021-11-19 | End: 2022-12-20

## 2021-11-19 ASSESSMENT — MIFFLIN-ST. JEOR: SCORE: 1501.05

## 2021-11-19 NOTE — LETTER
11/19/2021    [facility]  [address] [place], [place]. \[facility].\"" [id number]    RE: Mynor Curry       Dear Colleague,    I had the pleasure of seeing Mynor Curry in the [facility].    HPI and Plan:   See dictation          Orders Placed This Encounter   Procedures     Lipid Profile     ALT     Lipid Profile     ALT     Basic metabolic panel     Follow-Up with Cardiologist       Orders Placed This Encounter   Medications     Multiple Vitamins-Minerals (LUTEIN-ZEAXANTHIN PO)     rosuvastatin (CRESTOR) 40 MG tablet     Sig: TAKE 1 TABLET EVERY DAY     Dispense:  90 tablet     Refill:  3       Medications Discontinued During This Encounter   Medication Reason     ciprofloxacin (CIPRO) 500 MG tablet Stopped by Patient     metroNIDAZOLE (FLAGYL) 500 MG tablet Stopped by Patient     ondansetron (ZOFRAN ODT) 4 MG ODT tab Stopped by Patient     rosuvastatin (CRESTOR) 20 MG tablet Reorder         Encounter Diagnoses   Name Primary?     Essential hypertension with goal blood pressure less than 140/90 Yes     Hyperlipidemia LDL goal <130      Coronary artery disease involving coronary bypass graft of native heart without angina pectoris      Pure hypercholesterolemia        CURRENT MEDICATIONS:  Current Outpatient Medications   Medication Sig Dispense Refill     aspirin EC 81 MG tablet Take 1 tablet (81 mg) by mouth daily       Cholecalciferol (VITAMIN D) 2000 UNITS CAPS Take by mouth daily       gabapentin (NEURONTIN) 300 MG capsule Take 300 mg by mouth 2 times daily        Ibuprofen (ADVIL PO) Take 400 mg by mouth every 4 hours as needed        losartan-hydrochlorothiazide (HYZAAR) 100-12.5 MG tablet Take 1 tablet by mouth daily 90 tablet 3     Multiple Vitamins-Minerals (DAILY MULTIVITAMIN PO) Take by mouth daily       omeprazole (PRILOSEC) 20 MG DR capsule TAKE 1 CAPSULE (20 MG) BY MOUTH DAILY 30 capsule 1     rosuvastatin (CRESTOR) 40 MG tablet TAKE 1  TABLET EVERY DAY 90 tablet 3     sildenafil (VIAGRA) 100 MG tablet Take 0.5-1 tablets ( mg) by mouth daily as needed 6 tablet 11     valACYclovir (VALTREX) 500 MG tablet TAKE ONE TABLET BY MOUTH TWICE DAILY FOR THREE DAYS FOR EACH OUTBREAK 30 tablet 1     Multiple Vitamins-Minerals (LUTEIN-ZEAXANTHIN PO)          ALLERGIES     Allergies   Allergen Reactions     Augmentin      vomiting       PAST MEDICAL HISTORY:  Past Medical History:   Diagnosis Date     Arthritis      Coronary artery disease     Mild     Erectile dysfunction due to arterial insufficiency 8/31/2015     Hyperlipidemia LDL goal <70 2/4/2017     Hypertension goal BP (blood pressure) < 140/90 7/17/2013    Since 2006      Syncope     vasovagal       PAST SURGICAL HISTORY:  Past Surgical History:   Procedure Laterality Date     ARTHROPLASTY HIP ANTERIOR  4/30/2014    Procedure: ARTHROPLASTY HIP ANTERIOR;  Surgeon: Ayaz Butcher MD;  Location: [facility] OR     ARTHROSCOPY KNEE WITH MEDIAL MENISCECTOMY  2001    left     ARTHROSCOPY SHOULDER  1999    right     COLONOSCOPY       HEMILAMINECTOMY, DISCECTOMY LUMBAR ONE LEVEL, COMBINED  1989     HERNIA REPAIR, INGUINAL RT/LT  2011    bilateral     ORTHOPEDIC SURGERY         FAMILY HISTORY:  Family History   Problem Relation Age of Onset     Arthritis Mother      Hypertension Mother      Arthritis Father      Hypertension Father      Arrhythmia Father         atrial fib     C.A.D. No family hx of      Diabetes No family hx of      Prostate Cancer No family hx of      Cancer - colorectal No family hx of         mother with some polyps though       SOCIAL HISTORY:  Social History     Socioeconomic History     Marital status:      Spouse name: Melanie     Number of children: 3     Years of education: None     Highest education level: None   Occupational History     Occupation:      Employer: RETIRED   Tobacco Use     Smoking status: Never Smoker     Smokeless tobacco: Never Used    Substance and Sexual Activity     Alcohol use: Yes     Alcohol/week: 0.0 standard drinks     Comment: 2-3 drinks per week     Drug use: No     Sexual activity: Yes     Partners: Female     Birth control/protection: Condom   Other Topics Concern     Parent/sibling w/ CABG, MI or angioplasty before 65F 55M? No      Service No     Blood Transfusions No     Caffeine Concern No     Comment: Hot tea daily - 2 cups     Occupational Exposure No     Hobby Hazards No     Sleep Concern No     Stress Concern No     Weight Concern No     Special Diet No     Back Care No     Exercise Yes     Comment: biking- 2-3 days per week  on average     Bike Helmet Yes     Seat Belt Yes     Self-Exams Yes   Social History Narrative     None     Social Determinants of Health     Financial Resource Strain: Not on file   Food Insecurity: Not on file   Transportation Needs: Not on file   Physical Activity: Not on file   Stress: Not on file   Social Connections: Not on file   Intimate Partner Violence: Not on file   Housing Stability: Not on file       Review of Systems:  Skin:  Negative for       Eyes:  Positive for glasses    ENT:  Negative for postnasal drainage;hoarseness getting over a cold  Respiratory:  Negative for cough productive cough in AM - from drainage   Cardiovascular:  Negative      Gastroenterology: Negative      Genitourinary:  Positive for erectile dysfunction;prostate problem;urinary frequency;decreased urinary stream;nocturia BPH  Musculoskeletal:  Positive for arthritis;back pain;joint pain bone on bone - both knees, thumbs - stiffness and pain, arthritis in both big toes; Degenerative scoliosis  Neurologic:  Negative for headaches in AM -  has decreased   Psychiatric:  Negative for sleep disturbances moods have improved  Heme/Lymph/Imm:  Negative for allergies RX allergy  Endocrine:  Negative for        Physical Exam:  Vitals: /64 (BP Location: Right arm, Patient Position: Sitting, Cuff Size: Adult Regular)  "  Pulse 65   Ht 1.727 m (5' 8\")   Wt 78.2 kg (172 lb 4.8 oz)   SpO2 99%   BMI 26.20 kg/m      Constitutional:  cooperative, alert and oriented, well developed, well nourished, in no acute distress thin      Skin:  warm and dry to the touch, no apparent skin lesions or masses noted          Head:  normocephalic, no masses or lesions        Eyes:  pupils equal and round        Lymph:No Cervical lymphadenopathy present     ENT:  no pallor or cyanosis, dentition good;no pallor or cyanosis        Neck:  carotid pulses are full and equal bilaterally, JVP normal, no carotid bruit        Respiratory:  normal breath sounds, clear to auscultation, normal A-P diameter, normal symmetry, normal respiratory excursion, no use of accessory muscles         Cardiac: regular rhythm, normal S1/S2, no S3 or S4, apical impulse not displaced, no murmurs, gallops or rubs                pulses full and equal, no bruits auscultated                                        GI:  not assessed this visit        Extremities and Muscular Skeletal:  no deformities, clubbing, cyanosis, erythema observed;no edema              Neurological:  no gross motor deficits;affect appropriate        Psych:  Alert and Oriented x 3        CC  No referring provider defined for this encounter.    Thank you for allowing me to participate in the care of your patient.      Sincerely,     Alfonso Ng MD, MD     [facility]  cc:   No referring provider defined for this encounter.        "

## 2021-11-19 NOTE — PROGRESS NOTES
Service Date: 11/19/2021    CARDIOLOGY FOLLOWUP VISIT     REFERRING PHYSICIAN:  Venkat Brantley    HISTORY OF PRESENT ILLNESS:  It was my pleasure to see your patient Mynor Curry, who is a very pleasant, 71-year-old patient with a past history of coronary artery disease based upon a CT coronary angiogram.  If you remember, the CT coronary angiogram was performed because the patient had a false positive stress test.  The patient also in the past had a history of vasovagal syncope.  Since I last saw him, he has been doing relatively well from a cardiac point of view. He unfortunately had a bad bout of diverticulitis over the summer, and he also has had progressive arthritis of both knees, such that he is going to have one knee replacement performed in January and possibly a second 2-3 months later.  His last lipid profile at your office showed that his LDL cholesterol has risen to 97.  The HDL is normal at 71 as are the triglycerides at 98 with a total cholesterol of 186.  This is a significant departure from his previous good lipid profiles when his LDL was 62, HDL was 60, and triglycerides were 87 with a total cholesterol of 139.  There is no obvious cause for this.  He does drink one whiskey or bourbon at nighttime, maybe 7 days a week, but that typically would raise triglycerides and not so much the LDL.  I think the most likely reason is that with his knee discomfort that he is not able to exercise near as much, and he is not able to walk as far as he used to in the past.  His weight is pretty much the same as it has been in the past.  His blood pressure is well controlled at 128/64.  His basic metabolic profile, which was drawn in August, was also in the normal range with a BUN of 18, creatinine of 0.81, GFR of 89, sodium of 140 and potassium of 3.5.  We do check that every year because he is taking losartan and hydrochlorothiazide, both of which can affect the electrolytes and renal  function.    IMPRESSION:    1.  Coronary artery disease.  The patient is asymptomatic with respect to coronary artery disease with no symptoms suggestive of angina pectoris.  2.  Hyperlipidemia.  There has been a significant rise in his LDL cholesterol from previously as I described above.  He is not within secondary prevention guidelines at present despite being on rosuvastatin 20 mg per day.  3.  Normotensive.  4.  No recurrence of vasovagal syncope.    PLAN:  We will increase the dose of rosuvastatin to 40 mg per day, and I am hopeful that when he has his orthopedic surgery to his knees that he be more active and have better cholesterol control.  We will recheck his lipids in 6 weeks' time with liver function tests after increasing the dose of the rosuvastatin.  I will see the patient back again in 1 year's time.  I will repeat his lipids and his basic metabolic profile at that stage.    As always, however, he has been told to contact us if he has any questions or any concerns.    cc:  [place] [address]    Alfonso Lu MD, [facility]        D: 2021   T: 2021   MT: caitlyn    Name:     BRINA PALACIOS  MRN:      [id number]        Account:      [id number]   :      1950           Service Date: 2021       Document: [id number]

## 2021-11-19 NOTE — TELEPHONE ENCOUNTER
Received call from pt asking if he can take BACON-2 inhibitors for his knee pain instead of ibuprofen. Will message Dr. Ng to review. Mimi CASTRO

## 2021-11-19 NOTE — TELEPHONE ENCOUNTER
They have a slightly higher incidence of adverse cardiac events compared to the regular NSAIDS. Would use the least amount for the shortest amount of time. thx

## 2021-11-19 NOTE — PROGRESS NOTES
HPI and Plan:   See dictation          Orders Placed This Encounter   Procedures     Lipid Profile     ALT     Lipid Profile     ALT     Basic metabolic panel     Follow-Up with Cardiologist       Orders Placed This Encounter   Medications     Multiple Vitamins-Minerals (LUTEIN-ZEAXANTHIN PO)     rosuvastatin (CRESTOR) 40 MG tablet     Sig: TAKE 1 TABLET EVERY DAY     Dispense:  90 tablet     Refill:  3       Medications Discontinued During This Encounter   Medication Reason     ciprofloxacin (CIPRO) 500 MG tablet Stopped by Patient     metroNIDAZOLE (FLAGYL) 500 MG tablet Stopped by Patient     ondansetron (ZOFRAN ODT) 4 MG ODT tab Stopped by Patient     rosuvastatin (CRESTOR) 20 MG tablet Reorder         Encounter Diagnoses   Name Primary?     Essential hypertension with goal blood pressure less than 140/90 Yes     Hyperlipidemia LDL goal <130      Coronary artery disease involving coronary bypass graft of native heart without angina pectoris      Pure hypercholesterolemia        CURRENT MEDICATIONS:  Current Outpatient Medications   Medication Sig Dispense Refill     aspirin EC 81 MG tablet Take 1 tablet (81 mg) by mouth daily       Cholecalciferol (VITAMIN D) 2000 UNITS CAPS Take by mouth daily       gabapentin (NEURONTIN) 300 MG capsule Take 300 mg by mouth 2 times daily        Ibuprofen (ADVIL PO) Take 400 mg by mouth every 4 hours as needed        losartan-hydrochlorothiazide (HYZAAR) 100-12.5 MG tablet Take 1 tablet by mouth daily 90 tablet 3     Multiple Vitamins-Minerals (DAILY MULTIVITAMIN PO) Take by mouth daily       omeprazole (PRILOSEC) 20 MG DR capsule TAKE 1 CAPSULE (20 MG) BY MOUTH DAILY 30 capsule 1     rosuvastatin (CRESTOR) 40 MG tablet TAKE 1 TABLET EVERY DAY 90 tablet 3     sildenafil (VIAGRA) 100 MG tablet Take 0.5-1 tablets ( mg) by mouth daily as needed 6 tablet 11     valACYclovir (VALTREX) 500 MG tablet TAKE ONE TABLET BY MOUTH TWICE DAILY FOR THREE DAYS FOR EACH OUTBREAK 30 tablet  1     Multiple Vitamins-Minerals (LUTEIN-ZEAXANTHIN PO)          ALLERGIES     Allergies   Allergen Reactions     Augmentin      vomiting       PAST MEDICAL HISTORY:  Past Medical History:   Diagnosis Date     Arthritis      Coronary artery disease     Mild     Erectile dysfunction due to arterial insufficiency 8/31/2015     Hyperlipidemia LDL goal <70 2/4/2017     Hypertension goal BP (blood pressure) < 140/90 7/17/2013    Since 2006      Syncope     vasovagal       PAST SURGICAL HISTORY:  Past Surgical History:   Procedure Laterality Date     ARTHROPLASTY HIP ANTERIOR  4/30/2014    Procedure: ARTHROPLASTY HIP ANTERIOR;  Surgeon: Ayaz Butcher MD;  Location: [facility] OR     ARTHROSCOPY KNEE WITH MEDIAL MENISCECTOMY  2001    left     ARTHROSCOPY SHOULDER  1999    right     COLONOSCOPY       HEMILAMINECTOMY, DISCECTOMY LUMBAR ONE LEVEL, COMBINED  1989     HERNIA REPAIR, INGUINAL RT/LT  2011    bilateral     ORTHOPEDIC SURGERY         FAMILY HISTORY:  Family History   Problem Relation Age of Onset     Arthritis Mother      Hypertension Mother      Arthritis Father      Hypertension Father      Arrhythmia Father         atrial fib     C.A.D. No family hx of      Diabetes No family hx of      Prostate Cancer No family hx of      Cancer - colorectal No family hx of         mother with some polyps though       SOCIAL HISTORY:  Social History     Socioeconomic History     Marital status:      Spouse name: Melanie     Number of children: 3     Years of education: None     Highest education level: None   Occupational History     Occupation:      Employer: RETIRED   Tobacco Use     Smoking status: Never Smoker     Smokeless tobacco: Never Used   Substance and Sexual Activity     Alcohol use: Yes     Alcohol/week: 0.0 standard drinks     Comment: 2-3 drinks per week     Drug use: No     Sexual activity: Yes     Partners: Female     Birth control/protection: Condom   Other Topics Concern      "Parent/sibling w/ CABG, MI or angioplasty before 65F 55M? No      Service No     Blood Transfusions No     Caffeine Concern No     Comment: Hot tea daily - 2 cups     Occupational Exposure No     Hobby Hazards No     Sleep Concern No     Stress Concern No     Weight Concern No     Special Diet No     Back Care No     Exercise Yes     Comment: biking- 2-3 days per week  on average     Bike Helmet Yes     Seat Belt Yes     Self-Exams Yes   Social History Narrative     None     Social Determinants of Health     Financial Resource Strain: Not on file   Food Insecurity: Not on file   Transportation Needs: Not on file   Physical Activity: Not on file   Stress: Not on file   Social Connections: Not on file   Intimate Partner Violence: Not on file   Housing Stability: Not on file       Review of Systems:  Skin:  Negative for       Eyes:  Positive for glasses    ENT:  Negative for postnasal drainage;hoarseness getting over a cold  Respiratory:  Negative for cough productive cough in AM - from drainage   Cardiovascular:  Negative      Gastroenterology: Negative      Genitourinary:  Positive for erectile dysfunction;prostate problem;urinary frequency;decreased urinary stream;nocturia BPH  Musculoskeletal:  Positive for arthritis;back pain;joint pain bone on bone - both knees, thumbs - stiffness and pain, arthritis in both big toes; Degenerative scoliosis  Neurologic:  Negative for headaches in AM -  has decreased   Psychiatric:  Negative for sleep disturbances moods have improved  Heme/Lymph/Imm:  Negative for allergies RX allergy  Endocrine:  Negative for        Physical Exam:  Vitals: /64 (BP Location: Right arm, Patient Position: Sitting, Cuff Size: Adult Regular)   Pulse 65   Ht 1.727 m (5' 8\")   Wt 78.2 kg (172 lb 4.8 oz)   SpO2 99%   BMI 26.20 kg/m      Constitutional:  cooperative, alert and oriented, well developed, well nourished, in no acute distress thin      Skin:  warm and dry to the touch, no " apparent skin lesions or masses noted          Head:  normocephalic, no masses or lesions        Eyes:  pupils equal and round        Lymph:No Cervical lymphadenopathy present     ENT:  no pallor or cyanosis, dentition good;no pallor or cyanosis        Neck:  carotid pulses are full and equal bilaterally, JVP normal, no carotid bruit        Respiratory:  normal breath sounds, clear to auscultation, normal A-P diameter, normal symmetry, normal respiratory excursion, no use of accessory muscles         Cardiac: regular rhythm, normal S1/S2, no S3 or S4, apical impulse not displaced, no murmurs, gallops or rubs                pulses full and equal, no bruits auscultated                                        GI:  not assessed this visit        Extremities and Muscular Skeletal:  no deformities, clubbing, cyanosis, erythema observed;no edema              Neurological:  no gross motor deficits;affect appropriate        Psych:  Alert and Oriented x 3        CC  No referring provider defined for this encounter.

## 2021-12-07 ENCOUNTER — TRANSFERRED RECORDS (OUTPATIENT)
Dept: HEALTH INFORMATION MANAGEMENT | Facility: CLINIC | Age: 71
End: 2021-12-07

## 2021-12-28 ENCOUNTER — LAB (OUTPATIENT)
Dept: LAB | Facility: CLINIC | Age: 71
End: 2021-12-28
Payer: MEDICARE

## 2021-12-28 DIAGNOSIS — I25.810 CORONARY ARTERY DISEASE INVOLVING CORONARY BYPASS GRAFT OF NATIVE HEART WITHOUT ANGINA PECTORIS: ICD-10-CM

## 2021-12-28 LAB
ALT SERPL W P-5'-P-CCNC: 40 U/L (ref 0–70)
CHOLEST SERPL-MCNC: 167 MG/DL
FASTING STATUS PATIENT QL REPORTED: YES
HDLC SERPL-MCNC: 71 MG/DL
LDLC SERPL CALC-MCNC: 71 MG/DL
NONHDLC SERPL-MCNC: 96 MG/DL
TRIGL SERPL-MCNC: 125 MG/DL

## 2021-12-28 PROCEDURE — 84460 ALANINE AMINO (ALT) (SGPT): CPT | Performed by: INTERNAL MEDICINE

## 2021-12-28 PROCEDURE — 80061 LIPID PANEL: CPT | Performed by: INTERNAL MEDICINE

## 2021-12-28 PROCEDURE — 36415 COLL VENOUS BLD VENIPUNCTURE: CPT | Performed by: INTERNAL MEDICINE

## 2022-01-04 ENCOUNTER — TELEPHONE (OUTPATIENT)
Dept: CARDIOLOGY | Facility: CLINIC | Age: 72
End: 2022-01-04
Payer: COMMERCIAL

## 2022-01-05 NOTE — TELEPHONE ENCOUNTER
"Reviewed lipids/alt showing   !LIPID/HEPATIC Latest Ref Rng & Units 9/24/2020 12/28/2021   CHOL <200 mg/dL 139 167   TRIGLYCERIDES <150 mg/dL 87 125   HDL >=40 mg/dL 60 71   LDL <=100 mg/dL 62 71   VLDL 0 - 30 mg/dL     NHDL <130 mg/dL 79 96   CHOLHDLRATIO 0.0 - 5.0     AST 0 - 45 U/L     ALT 0 - 70 U/L 36 40     Per office note dated 11/19/21, Dr. Ng recommended, \"We will increase the dose of rosuvastatin to 40 mg per day, and I am hopeful that when he has his orthopedic surgery to his knees that he be more active and have better cholesterol control.  We will recheck his lipids in 6 weeks' time with liver function tests after increasing the dose of the rosuvastatin.\"       "

## 2022-01-12 ENCOUNTER — TRANSFERRED RECORDS (OUTPATIENT)
Dept: HEALTH INFORMATION MANAGEMENT | Facility: CLINIC | Age: 72
End: 2022-01-12
Payer: COMMERCIAL

## 2022-01-27 ENCOUNTER — TELEPHONE (OUTPATIENT)
Dept: CARDIOLOGY | Facility: CLINIC | Age: 72
End: 2022-01-27
Payer: COMMERCIAL

## 2022-01-27 NOTE — TELEPHONE ENCOUNTER
[facility] Call Center    Phone Message    May a detailed message be left on voicemail: yes     Reason for Call: Medication Question or concern regarding medication- having knee surgery Feb 10 and might be taking celebrex- is that ok with all his heart meds?    Prescription Clarification  Name of Medication: Celebrex(not prescribed yet)  Prescribing Provider: Dr. Dwayne Carter   Pharmacy: [facility]           Action Taken: sent to RN pool at [facility]    Travel Screening: Not Applicable

## 2022-01-27 NOTE — TELEPHONE ENCOUNTER
Pt called back, advised OK to take Celebrex for a short time after his knee replacement surgery. Pt advised to take the smallest dose for the shortest time needed. Pt verbalized understanding. Mimi CASTRO

## 2022-03-02 ENCOUNTER — HOSPITAL ENCOUNTER (EMERGENCY)
Facility: CLINIC | Age: 72
Discharge: HOME | End: 2022-03-02
Attending: EMERGENCY MEDICINE | Admitting: EMERGENCY MEDICINE
Payer: COMMERCIAL

## 2022-03-02 VITALS
TEMPERATURE: 97.6 F | OXYGEN SATURATION: 99 % | DIASTOLIC BLOOD PRESSURE: 88 MMHG | HEART RATE: 79 BPM | BODY MASS INDEX: 24.78 KG/M2 | WEIGHT: 163 LBS | RESPIRATION RATE: 18 BRPM | SYSTOLIC BLOOD PRESSURE: 113 MMHG

## 2022-03-02 DIAGNOSIS — R55 VASOVAGAL SYNCOPE: ICD-10-CM

## 2022-03-02 LAB
ALBUMIN SERPL-MCNC: 3.1 G/DL (ref 3.4–5)
ALBUMIN UR-MCNC: 20 MG/DL
ALP SERPL-CCNC: 69 U/L (ref 40–150)
ALT SERPL W P-5'-P-CCNC: 41 U/L (ref 0–70)
ANION GAP SERPL CALCULATED.3IONS-SCNC: 8 MMOL/L (ref 3–14)
APPEARANCE UR: CLEAR
AST SERPL W P-5'-P-CCNC: 29 U/L (ref 0–45)
ATRIAL RATE - MUSE: 69 BPM
BASOPHILS # BLD AUTO: 0 10E3/UL (ref 0–0.2)
BASOPHILS NFR BLD AUTO: 1 %
BILIRUB SERPL-MCNC: 0.6 MG/DL (ref 0.2–1.3)
BILIRUB UR QL STRIP: NEGATIVE
BUN SERPL-MCNC: 21 MG/DL (ref 7–30)
CALCIUM SERPL-MCNC: 9.9 MG/DL (ref 8.5–10.1)
CHLORIDE BLD-SCNC: 105 MMOL/L (ref 94–109)
CO2 SERPL-SCNC: 24 MMOL/L (ref 20–32)
COLOR UR AUTO: ABNORMAL
CREAT SERPL-MCNC: 0.94 MG/DL (ref 0.66–1.25)
DIASTOLIC BLOOD PRESSURE - MUSE: NORMAL MMHG
EOSINOPHIL # BLD AUTO: 0.1 10E3/UL (ref 0–0.7)
EOSINOPHIL NFR BLD AUTO: 1 %
ERYTHROCYTE [DISTWIDTH] IN BLOOD BY AUTOMATED COUNT: 13.2 % (ref 10–15)
GFR SERPL CREATININE-BSD FRML MDRD: 86 ML/MIN/1.73M2
GLUCOSE BLD-MCNC: 106 MG/DL (ref 70–99)
GLUCOSE UR STRIP-MCNC: NEGATIVE MG/DL
HCT VFR BLD AUTO: 31.9 % (ref 40–53)
HGB BLD-MCNC: 10.3 G/DL (ref 13.3–17.7)
HGB UR QL STRIP: ABNORMAL
HOLD SPECIMEN: NORMAL
HYALINE CASTS: 9 /LPF
IMM GRANULOCYTES # BLD: 0.1 10E3/UL
IMM GRANULOCYTES NFR BLD: 1 %
INTERPRETATION ECG - MUSE: NORMAL
KETONES UR STRIP-MCNC: NEGATIVE MG/DL
LEUKOCYTE ESTERASE UR QL STRIP: NEGATIVE
LYMPHOCYTES # BLD AUTO: 2 10E3/UL (ref 0.8–5.3)
LYMPHOCYTES NFR BLD AUTO: 25 %
MCH RBC QN AUTO: 30.3 PG (ref 26.5–33)
MCHC RBC AUTO-ENTMCNC: 32.3 G/DL (ref 31.5–36.5)
MCV RBC AUTO: 94 FL (ref 78–100)
MONOCYTES # BLD AUTO: 0.8 10E3/UL (ref 0–1.3)
MONOCYTES NFR BLD AUTO: 10 %
MUCOUS THREADS #/AREA URNS LPF: PRESENT /LPF
NEUTROPHILS # BLD AUTO: 5.1 10E3/UL (ref 1.6–8.3)
NEUTROPHILS NFR BLD AUTO: 62 %
NITRATE UR QL: NEGATIVE
NRBC # BLD AUTO: 0 10E3/UL
NRBC BLD AUTO-RTO: 0 /100
P AXIS - MUSE: 61 DEGREES
PH UR STRIP: 6.5 [PH] (ref 5–7)
PLATELET # BLD AUTO: 626 10E3/UL (ref 150–450)
POTASSIUM BLD-SCNC: 4.9 MMOL/L (ref 3.4–5.3)
PR INTERVAL - MUSE: 172 MS
PROT SERPL-MCNC: 7 G/DL (ref 6.8–8.8)
QRS DURATION - MUSE: 98 MS
QT - MUSE: 394 MS
QTC - MUSE: 422 MS
R AXIS - MUSE: 70 DEGREES
RBC # BLD AUTO: 3.4 10E6/UL (ref 4.4–5.9)
RBC URINE: <1 /HPF
SODIUM SERPL-SCNC: 137 MMOL/L (ref 133–144)
SP GR UR STRIP: 1.02 (ref 1–1.03)
SPERM #/AREA URNS HPF: PRESENT /HPF
SQUAMOUS EPITHELIAL: <1 /HPF
SYSTOLIC BLOOD PRESSURE - MUSE: NORMAL MMHG
T AXIS - MUSE: 54 DEGREES
TROPONIN I SERPL HS-MCNC: 5 NG/L
UROBILINOGEN UR STRIP-MCNC: NORMAL MG/DL
VENTRICULAR RATE- MUSE: 69 BPM
WBC # BLD AUTO: 8.1 10E3/UL (ref 4–11)
WBC URINE: 1 /HPF

## 2022-03-02 PROCEDURE — 250N000011 HC RX IP 250 OP 636: Performed by: EMERGENCY MEDICINE

## 2022-03-02 PROCEDURE — 258N000003 HC RX IP 258 OP 636: Performed by: EMERGENCY MEDICINE

## 2022-03-02 PROCEDURE — 85018 HEMOGLOBIN: CPT | Performed by: EMERGENCY MEDICINE

## 2022-03-02 PROCEDURE — 36415 COLL VENOUS BLD VENIPUNCTURE: CPT | Performed by: EMERGENCY MEDICINE

## 2022-03-02 PROCEDURE — 82374 ASSAY BLOOD CARBON DIOXIDE: CPT | Performed by: EMERGENCY MEDICINE

## 2022-03-02 PROCEDURE — 81001 URINALYSIS AUTO W/SCOPE: CPT | Performed by: EMERGENCY MEDICINE

## 2022-03-02 PROCEDURE — 85041 AUTOMATED RBC COUNT: CPT | Performed by: EMERGENCY MEDICINE

## 2022-03-02 PROCEDURE — 93005 ELECTROCARDIOGRAM TRACING: CPT

## 2022-03-02 PROCEDURE — 96360 HYDRATION IV INFUSION INIT: CPT

## 2022-03-02 PROCEDURE — 250N000013 HC RX MED GY IP 250 OP 250 PS 637: Performed by: EMERGENCY MEDICINE

## 2022-03-02 PROCEDURE — 96361 HYDRATE IV INFUSION ADD-ON: CPT

## 2022-03-02 PROCEDURE — 84484 ASSAY OF TROPONIN QUANT: CPT | Performed by: EMERGENCY MEDICINE

## 2022-03-02 PROCEDURE — 99284 EMERGENCY DEPT VISIT MOD MDM: CPT | Mod: 25

## 2022-03-02 RX ORDER — ACETAMINOPHEN 500 MG
1000 TABLET ORAL ONCE
Status: COMPLETED | OUTPATIENT
Start: 2022-03-02 | End: 2022-03-02

## 2022-03-02 RX ORDER — SODIUM CHLORIDE 9 MG/ML
INJECTION, SOLUTION INTRAVENOUS CONTINUOUS
Status: DISCONTINUED | OUTPATIENT
Start: 2022-03-02 | End: 2022-03-02 | Stop reason: HOSPADM

## 2022-03-02 RX ORDER — FENTANYL CITRATE 50 UG/ML
75 INJECTION, SOLUTION INTRAMUSCULAR; INTRAVENOUS ONCE
Status: COMPLETED | OUTPATIENT
Start: 2022-03-02 | End: 2022-03-02

## 2022-03-02 RX ORDER — IBUPROFEN 600 MG/1
600 TABLET, FILM COATED ORAL ONCE
Status: COMPLETED | OUTPATIENT
Start: 2022-03-02 | End: 2022-03-02

## 2022-03-02 RX ADMIN — SODIUM CHLORIDE 1000 ML: 9 INJECTION, SOLUTION INTRAVENOUS at 11:09

## 2022-03-02 RX ADMIN — ACETAMINOPHEN 1000 MG: 500 TABLET, FILM COATED ORAL at 11:09

## 2022-03-02 RX ADMIN — FENTANYL CITRATE 75 MCG: 50 INJECTION, SOLUTION INTRAMUSCULAR; INTRAVENOUS at 11:35

## 2022-03-02 RX ADMIN — IBUPROFEN 600 MG: 600 TABLET ORAL at 11:09

## 2022-03-02 ASSESSMENT — ENCOUNTER SYMPTOMS
SHORTNESS OF BREATH: 0
BACK PAIN: 1
LIGHT-HEADEDNESS: 1

## 2022-03-02 NOTE — ED NOTES
Bed: ED01  Expected date: 3/2/22  Expected time: 10:16 AM  Means of arrival: Ambulance  Comments:  [id number]

## 2022-03-02 NOTE — ED TRIAGE NOTES
Pt was standing in kitchen when he had gradual onset of low back tightness followed by lightheadedness. Pt felt he was going to pass out, assisted to chair by wife. Per wife, pt became unresponsive for a few minutes. No seizure activity noted. Pt did not fall or hit head. Hx vasovagal episodes. Pt hypotensive on EMS arrival at 80 SBP. ABCs intact.     Of note, pt did take Viagra this morning for first time in 3 weeks d/t left knee replacement.

## 2022-03-02 NOTE — DISCHARGE INSTRUCTIONS
As we discussed, it is very likely that the reason you fainted is because of the dose of your Viagra medication that you took this morning.  Please be careful with this dose in the future, and to follow with your regular doctor the next 48 hours for repeat exam.  Come back to the ER immediately with any chest pain, any palpitations, any further fainting episodes or with any other concerns.

## 2022-03-02 NOTE — ED NOTES
Pt ambulated to restroom and back without assistance from RN. Pt denies any lightheadedness, dizziness, or any other sx upon ambulation, reports feeling well. MD notified.

## 2022-03-02 NOTE — ED PROVIDER NOTES
History   Chief Complaint:  Loss of Consciousness    The history is provided by the patient.      Mynor Curry is a 71 year old male with history of arthritis and frequent vasovagal sycnope presents via EMS with syncope. He took a larger dose of Viagra than he was prescribed and normally takes 2 hours before syncopal episode at 0930. He felt lightheaded with back pain so he sat down and wife states he was no longer present even though his eyes were open. His blood pressure was checked by paramedics who stated it was low. He has had many syncopal episodes before that are associated with pain which has increased after his knee replacement 3 weeks ago. He denies having chest pain and shortness of breath. He last took tylenol 2 hours ago. He currently feels dehydrated. Mynor received a workup for his syncope which was negative for anything serious.      Review of Systems   Respiratory: Negative for shortness of breath.    Cardiovascular: Negative for chest pain.   Musculoskeletal: Positive for back pain.   Neurological: Positive for syncope and light-headedness.   All other systems reviewed and are negative.        Allergies:  Augmentin    Medications:  aspirin EC 81 MG  gabapentin   losartan-hydrochlorothiazide   omeprazole  rosuvastatin  sildenafil   valacyclovir     Past Medical History:     Hyperlipidemia  Cardiomyopathy in diseases classified elsewhere  CAD  Syncope  Hyperlipidemia  Arthritis  Erectile dysfunction due to arterial insufficiency  Mild aortic stenosis  Anemia  Diverticulitis     Past Surgical History:    ARTHROPLASTY HIP ANTERIOR  ARTHROSCOPY KNEE WITH MEDIAL MENISCECTOMY  ARTHROSCOPY SHOULDER  HEMILAMINECTOMY, DISCECTOMY LUMBAR ONE LEVEL, COMBINED  HERNIA REPAIR, INGUINAL RT/LT  ORTHOPEDIC SURGERY     Family History:    Arthritis   Hypertension  Atrial fibrillation    Social History:  Presents with wife    Physical Exam     Patient Vitals for the past 24 hrs:   BP Temp Temp src Pulse Resp SpO2  Weight   03/02/22 1045 104/57 -- -- 70 -- 100 % --   03/02/22 1040 98/53 -- -- 69 -- 98 % --   03/02/22 1033 105/65 97.6  F (36.4  C) Oral 66 18 100 % 73.9 kg (163 lb)       Physical Exam  Vitals: reviewed by me  General: Pt seen on [facility], cooperative, and alert to conversation  Eyes: Tracking well, clear conjunctiva BL  ENT: MMM, midline trachea.   Lungs: No tachypnea, no accessory muscle use. No respiratory distress.   CV: Rate as above  Abd: Soft, non tender, no guarding, no rebound. Non distended  MSK: no joint effusion.  No evidence of trauma  Skin: No rash  Neuro: Clear speech and no facial droop.  Psych: Not RIS, no e/o AH/VH      Emergency Department Course   ECG  ECG obtained at 1055, ECG read at 1007  Normal sinus rhythm  Normal ECG   Rate 69 bpm. SD interval 172 ms. QRS duration 98 ms. QT/QTc 394/422 ms. P-R-T axes 61 70 54.     Imaging:  No orders to display     Report per radiology    Laboratory:  Labs Ordered and Resulted from Time of ED Arrival to Time of ED Departure   COMPREHENSIVE METABOLIC PANEL - Abnormal       Result Value    Sodium 137      Potassium 4.9      Chloride 105      Carbon Dioxide (CO2) 24      Anion Gap 8      Urea Nitrogen 21      Creatinine 0.94      Calcium 9.9      Glucose 106 (*)     Alkaline Phosphatase 69      AST 29      ALT 41      Protein Total 7.0      Albumin 3.1 (*)     Bilirubin Total 0.6      GFR Estimate 86     CBC WITH PLATELETS AND DIFFERENTIAL - Abnormal    WBC Count 8.1      RBC Count 3.40 (*)     Hemoglobin 10.3 (*)     Hematocrit 31.9 (*)     MCV 94      MCH 30.3      MCHC 32.3      RDW 13.2      Platelet Count 626 (*)     % Neutrophils 62      % Lymphocytes 25      % Monocytes 10      % Eosinophils 1      % Basophils 1      % Immature Granulocytes 1      NRBCs per 100 WBC 0      Absolute Neutrophils 5.1      Absolute Lymphocytes 2.0      Absolute Monocytes 0.8      Absolute Eosinophils 0.1      Absolute Basophils 0.0      Absolute Immature  Granulocytes 0.1      Absolute NRBCs 0.0     TROPONIN I   ROUTINE UA WITH MICROSCOPIC REFLEX TO CULTURE        Emergency Department Course:    Reviewed:  I reviewed nursing notes, vitals, past medical history and Care Everywhere    Assessments:  1116 I obtained history and examined the patient as noted above.       Disposition:  The patient was discharged to home.     Impression & Plan     Medical Decision Making:  This is a very pleasant 71-year-old male with numerous and perhaps dozens of previous episodes of fainting who presents to the ER with a fainting episode today.  He has no palpitations, no chest pain, and this is consistent with his previous vasovagal episodes.  He also has the added risk factor of taking an extra high dose of Viagra this morning, which certainly could lower his blood pressure.  Here in the ER, he is ambulating very well, received pain medication and has no evidence of cardiogenic syncope here.  He is asking to go home, and I do think this is appropriate.  His wife at bedside is okay with this plan as well, I see no indication of an abnormality on his EKG, and again he has no cardiac symptoms here and we have a compelling alternative diagnosis in the form of the Viagra side effect profile.  We will plan for discharge home with encouragement to have him follow-up with his regular doctor, he does tell me he had a full cardiac evaluation done for this in the past, and will come back with any changes.    Diagnosis:    ICD-10-CM    1. Vasovagal syncope  R55        Discharge Medications:  Discharge Medication List as of 3/2/2022  1:27 PM          Scribe Disclosure:  I, Srinath Madsen, am serving as a scribe at 11:06 AM on 3/2/2022 to document services personally performed by Richi Owen MD based on my observations and the provider's statements to me.              Richi Owen MD  03/02/22 [id number]

## 2022-03-29 ENCOUNTER — TRANSFERRED RECORDS (OUTPATIENT)
Dept: HEALTH INFORMATION MANAGEMENT | Facility: CLINIC | Age: 72
End: 2022-03-29
Payer: COMMERCIAL

## 2022-04-11 ENCOUNTER — TRANSFERRED RECORDS (OUTPATIENT)
Dept: HEALTH INFORMATION MANAGEMENT | Facility: CLINIC | Age: 72
End: 2022-04-11
Payer: COMMERCIAL

## 2022-04-26 ENCOUNTER — TRANSFERRED RECORDS (OUTPATIENT)
Dept: HEALTH INFORMATION MANAGEMENT | Facility: CLINIC | Age: 72
End: 2022-04-26
Payer: COMMERCIAL

## 2022-05-09 ENCOUNTER — TRANSFERRED RECORDS (OUTPATIENT)
Dept: HEALTH INFORMATION MANAGEMENT | Facility: CLINIC | Age: 72
End: 2022-05-09
Payer: COMMERCIAL

## 2022-05-12 NOTE — MR AVS SNAPSHOT
[address]  [address]    NAME: SULMA SERRANO/AGE/SEX: 1964 - 58 - F  PHYSICIAN: Lizet Larsen MD                                      ADMIT DATE: 22  UNIT #: [id number]                                                ACCT #: [id number]                                                                    LOC//BED: [id number]                                             PHYSICIAN REPORT                                         HISTORY AND PHYSICAL                                         REPORT # : [id number]  History and Physical Exam  Patient Examined By  Geri Padilla MD    22 21:51    Chief Complaint  Abnormal labs    History of Present Illness  This is a 58-year-old female with history of alcohol abuse, alcoholic cirrhosis, asthma who  presents to [facility]'s emergency room department with abnormal labs.  It appears that  the patient was recently hospitalized at [facility] at the end of April.  She is unable to  provide me much information about this hospitalization but she states that she was there for about  a week due to critically low sodium levels.  During that hospitalization it appears that she had a  paracentesis performed.  She also received albumin resuscitation.  It was suspected that  hyponatremia was due to volume overload in the setting of cirrhosis which initially improved with  fluid restriction.  She also received albumin resuscitation.  Demeclocycline was also added.  She  was subsequently discharged and was to have close follow-up with nephrology.  At the time of  discharge she was given a prescription for furosemide, spironolactone, lactulose as well as  pantoprazole.  Apparently in the outpatient setting she had laboratory exams performed on 5/3                After Visit Summary   5/16/2018    Mynor Curry    MRN: [id number]           Patient Information     Date Of Birth          1950        Visit Information        Provider Department      5/16/2018 12:40 PM [facility] Lu [facility]        Today's Diagnoses     Shoulder pain        Aftercare following surgery of the musculoskeletal system           Follow-ups after your visit        Your next 10 appointments already scheduled     May 21, 2018 10:50 AM CDT   [place] Extremity with Diego Hobson PT   [facility] ([facility])    [address] [address] [facility] [id number] [phone]            May 30, 2018  9:30 AM CDT   [place] Ext[place]ity [facility] PT   [facility]    [address] [facility] [id number] [phone]              Who to contact     If you have questions or need follow up information about today's clinic visit or your schedule please contact [facility] directly at [phone].  Normal or non-critical lab and imaging results will be communicated to you by InterExhart, letter or phone within 4 business days after the clinic has received the results. If you do not hear from us within 7 days, please contact the clinic through Cinemacraftt or phone. If you have a critical or abnormal lab result, we will notify you by phone as soon as possible.  Submit refill requests through Koudai or call your pharmacy and they will forward the refill request to us. Please allow 3 business days for your refill to be completed.          Additional Information About Your Visit        InterExhart Information     Koudai gives you secure access to your electronic health record. If you see a primary care provider, you can also send messages to your care team and make appointments. If you have questions, please call your primary care clinic.  If you do not have a primary care  provider, please call [phone] and they will assist you.        Care EveryWhere ID     This is your Care EveryWhere ID. This could be used by other organizations to access your [place] medical records  [id number]         Blood Pressure from Last 3 Encounters:   04/19/18 126/78   04/05/18 98/59   01/16/18 118/72    Weight from Last 3 Encounters:   04/19/18 82.7 kg (182 lb 4.8 oz)   04/05/18 82.1 kg (181 lb)   01/16/18 81.2 kg (179 lb)              We Performed the Following     Hot or Cold Packs Therapy     Therapeutic Exercises        Primary Care Provider Office Phone # Fax #    Doug Seo Jr., [phone] [address]  [place] [address]        Equal Access to Services     [facility]: Hadii aad ku hadasho Soomaali, waaxda luqadaha, qaybta kaalmada adeegyada, waxariana perez . So [facility] [phone].    ATENCIÓN: Si habla español, tiene a salas disposición servicios gratuitos de asistencia lingüística. Joesph al [phone].    We comply with applicable federal civil rights laws and [place] laws. We do not discriminate on the basis of race, color, national origin, age, disability, sex, sexual orientation, or gender identity.            Thank you!     Thank you for choosing [facility]  for your care. Our goal is always to provide you with excellent care. Hearing back from our patients is one way we can continue to improve our services. Please take a few minutes to complete the written survey that you may receive in the mail after your visit with us. Thank you!             Your Updated Medication List - Protect others around you: Learn how to safely use, store and throw away your medicines at www.disposemymeds.org.          This list is accurate as of 5/16/18  1:29 PM.  Always use your most recent med list.                   Brand Name Dispense Instructions for use Diagnosis    ADVIL PO      Take 400 mg by mouth every 4  which  showed a sodium of 121.  Liver enzymes were also dated.  It appears that outpatient medical team  attempted to reach out to her but was having difficulty.  A message was sent via [employer] to relay  message.  Directed to return back to the emergency room department.  These instructions were given  to her on May 4.  The patient did not seek immediate medical attention and she and her   decided to drive the RV to [place] to be closer to her daughter.  She presented to [facility]'s emergency room department for further evaluation.  The patient did receive a paracentesis  in the emergency room department with removal of 4.3 L of fluid.    Past Medical History  1.  Alcohol abuse  2.  Alcoholic cirrhosis  3.  Ascites  4.  Sciatica  5.  Lumbar degenerative disc disease  6.  Macrocytic anemia  7.  Osteopenia  8.  Osteoarthritis of left knee  9.  Rheumatoid arthritis  10.  Transaminitis  11.  Vitamin D deficiency  12.  Bunionectomy bilateral  13.  Tonsillectomy    Family History    FH: emphysema    FATHER,   FH: pancreatic cancer    FATHER,   Additional Family History  Both parents are  due to old age.    Social History  The patient is  and lives with her  in an .  She does smoke a pack a day for  greater than 30 years last cigarette was approximately a month ago.  She also used to drink heavily  however she states that her last drink was about a month ago.  She is retired and used to be a  .  Smoking Status:  Former smoker  Hx Smoking Exposure:  Yes  Current Smoke Exposure:  No  Have You Smoked in the Last 12:  Yes  Do you dip or chew tobacco?:  No  Hx Alcohol Use:  No (STATES SHE QUIT A MONTH AGO)  Recreational Drug Use:  No  Living Arrangements:  With Spouse    Allergies  Coded Allergies:       Latex (Verified  Allergy, Intermediate, Rash, 22)       Penicillins (Verified  Allergy, Intermediate, Rash, 22)    Medications  Albuterol Inh 108  Mcg/Act* 18 Gm Aero, 2 PUFF INH Q6H  Demeclocycline* 150 Mg Tab, 150 MG PO Q12HR  Folic Acid* 1 Mg Tab, 1 MG PO DAILY  Furosemide* 40 Mg Tab, 40 MG PO DAILY  Lactulose* 10 Gm/15 Ml Syrp, 20 GM PO TID  Magnesium Oxide* 400 Mg Tab, 400 MG PO DAILY  Pantoprazole* 40 Mg Tabec, 40 MG PO BID  Potassium Chloride Er* 10 Meq Tabcr, 20 MEQ PO DAILY PC  Spironolactone* 100 Mg Tab, 100 MG PO DAILY  Zinc Sulfate* 220 Mg Tab, 220 MG PO DAILY AC    Review of Systems  12 point review of system was obtained and was negative except for what was mentioned above.    Height/Weight/BMI  Height (Feet):  5  Height (Inches):  4  Weight (Pounds):  160.0  Weight (Ounces):  5.40  Weight (Kilograms):  72.73  Body Mass Index:  27.46  Body Mass Index:  27.50    Physical Exam    Vital Signs        Date Time  Temp Pulse Resp B/P (MAP) Pulse Ox O2 Delivery O2 Flow Rate FiO2    5/11/22 20:00 97.8 100 14 130/73 (92) 96 Room Air      Physical Exam  General: Not in apparent distress, alert, oriented x3.  HEENT: Normocephalic, atraumatic head.  Pupils are equal, round, reactive to light bilaterally.  Sclera icterus present extraocular movements are intact bilaterally.  Neck: Supple, no JVD, no carotid bruits, no lymphadenopathy, no thyromegaly.  Cardiovascular: Regular rate and rhythm, S1-S2 is normal, no murmurs, rubs, gallops.  Respiratory: Clear to auscultation bilaterally, no wheezes, no rhonchi's, no rales.  Gastrointestinal: Abdomen is soft, nontender, slightly distended.  Bowel sounds are present in all  4 quadrants.  No gross organomegaly.  Extremities: 2+ pitting edema bilaterally no calf tenderness.  Peripheral pulses are intact.  Neurological: Cranial nerves II to XII grossly intact, speech is intact.  Muscle strength is plus 5  out of 5 in all 4 extremities.  No gross neurological deficits.    Diagnostic Studies  Diagnostic Studies  CT abdomen and pelvis  IMPRESSION:  1. Volume ascites.  2. Cirrhotic morphology of the liver. Mild  hours as needed        aspirin 81 MG EC tablet      Take 1 tablet (81 mg) by mouth daily    Occlusion and stenosis of carotid artery without mention of cerebral infarction, Abnormal cardiovascular stress test       * DAILY MULTIVITAMIN PO      Take by mouth daily        * multivitamin  with lutein Caps per capsule      Take 1 capsule by mouth daily        famotidine 20 MG tablet    PEPCID     Take 20 mg by mouth 2 times daily        losartan-hydrochlorothiazide 100-12.5 MG per tablet    HYZAAR    90 tablet    Take 1 tablet by mouth daily    Essential hypertension with goal blood pressure less than 140/90       rosuvastatin 20 MG tablet    CRESTOR    90 tablet    Take 1 tablet (20 mg) by mouth daily    Hyperlipidemia LDL goal <130       sildenafil 100 MG tablet    VIAGRA    6 tablet    Take 0.5-1 tablets ( mg) by mouth daily as needed    Erectile dysfunction due to arterial insufficiency       valACYclovir 500 MG tablet    VALTREX    30 tablet    One tablet by mouth twice daily for three days for each outbreak    Herpes simplex infection of other site of male genital organ       vitamin D 2000 units Caps      Take by mouth daily        * Notice:  This list has 2 medication(s) that are the same as other medications prescribed for you. Read the directions carefully, and ask your doctor or other care provider to review them with you.       subcentimeter heterogeneity which is small for  characterization although related to underlying cysts or meningioma.  3. Cholelithiasis. The gallbladder wall is edematous. This is nonspecific and can be seen with hypo  proteinemia. Evaluation for inflammatory changes is limited due to the ascites. Exclude any  symptomatology referrable to cholecystitis clinically.  4. Diverticulosis of the colon without definite diverticulitis. Evaluation is somewhat limited for  inflammatory changes due to presence of ascites. There are some regions of wall thickening within  the right sigmoid region. Exclude mild colitis clinically.  5. Additional nonemergent details as above.      Chest x-ray  IMPRESSION:  1. There is no acute cardiopulmonary disease.    Assessment and Plan  Hyponatremia  -Likely high per bulimic hyponatremia  -Nephrology has been placed on consultation.  -Hold off on diuretics for now.      Cirrhosis  -With recurrent ascites.  -Status post paracentesis with removal of 4.3 L emergency room.  -Diuretics per nephrology  -Continue home medication of lactulose.      Anemia  -No signs or symptoms of bleeding.  Unclear what baseline is.  -Continue to follow the trend.      Thrombocytopenia  -History of cirrhosis  -No signs or symptoms of active bleeding.  Continue to monitor trend.      Transaminitis  -Patient with known liver cirrhosis.  -The patient states that her last alcoholic beverages a month ago.  -Cholelithiasis noted on CT.  No abdominal pain.  No nausea or vomiting.  Continue to monitor  trend.      DVT prophylaxis  -Avoid anticoagulation for now given thrombocytopenia.      I have discussed above findings and plan in detail with the patient, all questions were answered.  I have attempted to call her  Carlos however he is unavailable.  I have discussed above  case with Dr. Burkett from nephrology.  I will attempt to obtain records from [facility] hospitalization.    Laboratory Data                                              Laboratory Tests      Test   5/11/22  18:00 5/11/22  09:22    Sodium Level 121  L 120  L    Potassium Level 3.6   3.6    Chloride Level 84  L 83  L    Carbon Dioxide Level 30   29    Anion Gap 7   8    Blood Urea Nitrogen 13.0   12.0    Creatinine 0.83   0.91    Estimat Glomerular Filtration  Rate > 60   > 60      BUN/Creatinine Ratio 15.7   13.2    Glucose Level 97   102  H    Calcium Level 8.6   8.9    White Blood Count  7.5    Red Blood Count  2.32  L    Hemoglobin  9.4  L    Hematocrit  25.7  L    Mean Corpuscular Volume  111.1  H    Mean Corpuscular Hemoglobin  40.8  H    Mean Corpuscular Hemoglobin  Concent   36.7  H      Red Cell Distribution Width  14.9    Platelet Count  87  L    Mean Platelet Volume  7.2    Neutrophils (%) (Auto)  73.7    Lymphocytes (%) (Auto)  9.5    Monocytes (%) (Auto)  15.2    Eosinophils (%) (Auto)  1.3    Basophils (%) (Auto)  0.3    Neutrophils # (Auto)  5.5    Lymphocytes # (Auto)  0.7  L    Monocytes # (Auto)  1.1  H    Eosinophils # (Auto)  0.1    Basophils # (Auto)  0.0    Differential Slide Review     Agree with  auto res.    Blood Smear Pathologist Review  Pending    Prothrombin Time  20.8  H    Prothromb Time International  Ratio   1.8  H      Activated Partial  Thromboplast Time   38  H      Total Bilirubin  16.0  *H    Aspartate Amino Transf  (AST/SGOT)   142  H      Alanine Aminotransferase  (ALT/SGPT)   57  H      Alkaline Phosphatase  260  H    Ammonia  39    Total Protein  6.5    Albumin  2.7  L    Albumin/Globulin Ratio  0.71  L    Lipase  84  H            Geri Padilla MD                       May 11, 2022 22:11    <Electronically signed by Geri Padilla MD> 05/11/22 2211        ______________________________________________  DRAFT UNTIL SIGNED      CC: Geri Padilla MD;

## 2022-05-15 ENCOUNTER — HEALTH MAINTENANCE LETTER (OUTPATIENT)
Age: 72
End: 2022-05-15

## 2022-06-24 ENCOUNTER — THERAPY VISIT (OUTPATIENT)
Dept: PHYSICAL THERAPY | Facility: CLINIC | Age: 72
End: 2022-06-24
Payer: COMMERCIAL

## 2022-06-24 DIAGNOSIS — I10 ESSENTIAL HYPERTENSION WITH GOAL BLOOD PRESSURE LESS THAN 140/90: ICD-10-CM

## 2022-06-24 DIAGNOSIS — I25.810 CORONARY ARTERY DISEASE INVOLVING CORONARY BYPASS GRAFT OF NATIVE HEART WITHOUT ANGINA PECTORIS: ICD-10-CM

## 2022-06-24 DIAGNOSIS — E78.00 PURE HYPERCHOLESTEROLEMIA: ICD-10-CM

## 2022-06-24 DIAGNOSIS — E78.5 HYPERLIPIDEMIA LDL GOAL <130: ICD-10-CM

## 2022-06-24 DIAGNOSIS — M54.50 RIGHT-SIDED LOW BACK PAIN WITHOUT SCIATICA, UNSPECIFIED CHRONICITY: ICD-10-CM

## 2022-06-24 PROCEDURE — 97010 HOT OR COLD PACKS THERAPY: CPT | Mod: GP | Performed by: PHYSICAL THERAPIST

## 2022-06-24 PROCEDURE — 97161 PT EVAL LOW COMPLEX 20 MIN: CPT | Mod: GP | Performed by: PHYSICAL THERAPIST

## 2022-06-24 PROCEDURE — 97110 THERAPEUTIC EXERCISES: CPT | Mod: GP | Performed by: PHYSICAL THERAPIST

## 2022-06-24 PROCEDURE — 97012 MECHANICAL TRACTION THERAPY: CPT | Mod: GP | Performed by: PHYSICAL THERAPIST

## 2022-06-24 NOTE — PROGRESS NOTES
[facility]    OUTPATIENT Physical Therapy ORTHOPEDIC EVALUATION  PLAN OF TREATMENT FOR OUTPATIENT REHABILITATION  (COMPLETE FOR INITIAL CLAIMS ONLY)  Patient's Last Name, First Name, M.I.  YOB: 1950  Mynor Curry    Provider s Name:  [facility]   Medical Record No.  [id number]   Start of Care Date:  06/24/22   Onset Date:   06/23/22 (MD order)   Type:     _X__PT   ___OT Medical Diagnosis:    Encounter Diagnoses   Name Primary?    Essential hypertension with goal blood pressure less than 140/90     Hyperlipidemia LDL goal <130     Coronary artery disease involving coronary bypass graft of native heart without angina pectoris     Pure hypercholesterolemia     Right-sided low back pain without sciatica, unspecified chronicity         Treatment Diagnosis:  LBP        Goals:     06/24/22 0500   Body Part   Goals listed below are for LB   Goal #1   Goal #1 ambulation   Previous Functional Level Minutes patient could walk   Performance Level 30+   Current Functional Level Minutes patient can walk   Performance Level <10 with LBP   STG Target Performance Minutes patient will be able to walk   Performance Level 10 with 2/10 LBP or less   Rationale for safe community ambulation   Due Date 07/15/22    LTG Target Performance Minutes patient will be able to  walk   Performance Level 20 painfree   Rationale for safe community ambulation;to promote a healthy and active lifestyle   Due Date 08/19/22       Therapy Frequency:  2x/week  Predicted Duration of Therapy Intervention:  3 weeks    Diego Hobson, PT                 I CERTIFY THE NEED FOR THESE SERVICES FURNISHED UNDER        THIS PLAN OF TREATMENT AND WHILE UNDER MY CARE .             Physician Signature               Date    X_____________________________________________________                         Certification Date  From:  06/24/22   Certification Date To:  08/19/22    Referring Provider:  Ar Meier    Initial Assessment        See Epic Evaluation SOC Date: 06/24/22

## 2022-06-24 NOTE — PROGRESS NOTES
Physical Therapy Initial Evaluation  Subjective:  Pt describes intermittent right sided LBP.  Worse with walking, standing, lifting.  Has been a chronic issue and flared back up in April 2022.  Believes it may be related to how he is walking after having a left TKA in February 2022.  Received an injection in his back in April and again in May with little to no improvement.  Will possibly be having an ablasion procedure if PT does not help.  Referred to PT on 6/23/22.      The history is provided by the patient.   Therapist Generated HPI Evaluation         Type of problem:  Lumbar.    This is a recurrent condition.  Condition occurred with:  Insidious onset.    Patient reports pain:  SI joint right and lumbar spine right.  Pain is described as aching and stabbing and is intermittent.  Pain radiates to:  No radiation. Pain is worse in the P.M..  Since onset symptoms are gradually worsening.  Associated symptoms:  Loss of motion/stiffness. Symptoms are exacerbated by lifting, twisting, standing and walking  and relieved by heat (sitting, lying down).  Special tests included:  MRI.    Barriers include:  None as reported by patient.                        Objective:  Standing Alignment:          Pelvic:  Iliac crest high R (Possible LLD)          Gait:  Shortened stride, mild pelvic instability    Assistive Devices:  None                 Lumbar/SI Evaluation  ROM:    AROM Lumbar:   Flexion:          Full  Ext:                    Moderate restriction with LBP   Side Bend:        Left:  Moderate restriction    Right:  Moderate restriction with LBP  Rotation:           Left:     Right:   Side Glide:        Left:     Right:           Lumbar Myotomes:  not assessed            Lumbar DTR's:  not assessed            Lumbar Palpation:      Tenderness present at Right: PSIS                                                     General     ROS    Assessment/Plan:    Patient is a 71 year old male with lumbar complaints.    Patient  has the following significant findings with corresponding treatment plan.                Diagnosis 1:  LBP  Pain -  hot/cold therapy, mechanical traction, manual therapy, education and home program  Decreased ROM/flexibility - manual therapy, therapeutic exercise and home program    Therapy Evaluation Codes:   1) History comprised of:   Personal factors that impact the plan of care:      None.    Comorbidity factors that impact the plan of care are:      None.     Medications impacting care: None.  2) Examination of Body Systems comprised of:   Body structures and functions that impact the plan of care:      Lumbar spine.   Activity limitations that impact the plan of care are:      Lifting, Standing and Walking.  3) Clinical presentation characteristics are:   Stable/Uncomplicated.  4) Decision-Making    Low complexity using standardized patient assessment instrument and/or measureable assessment of functional outcome.  Cumulative Therapy Evaluation is: Low complexity.    Previous and current functional limitations:  (See Goal Flow Sheet for this information)    Short term and Long term goals: (See Goal Flow Sheet for this information)     Communication ability:  Patient appears to be able to clearly communicate and understand verbal and written communication and follow directions correctly.  Treatment Explanation - The following has been discussed with the patient:   RX ordered/plan of care  Anticipated outcomes  Possible risks and side effects  This patient would benefit from PT intervention to resume normal activities.   Rehab potential is good.    Frequency:  2 X week, once daily  Duration:  for 3 weeks  Discharge Plan:  Achieve all LTG.  Independent in home treatment program.  Reach maximal therapeutic benefit.    Please refer to the daily flowsheet for treatment today, total treatment time and time spent performing 1:1 timed codes.

## 2022-06-24 NOTE — LETTER
[facility]  [address] [facility] [address] [phone]    2022    Re: Mynor Curry   :   1950  MRN:  [id number]   REFERRING PHYSICIAN:   Ar THOMPSON [facility]    Date of Initial Evaluation: 22  Visits:  Rxs Used: 1  Reason for Referral:     Essential hypertension with goal blood pressure less than 140/90  Hyperlipidemia LDL goal <130  Coronary artery disease involving coronary bypass graft of native heart without angina pectoris  Pure hypercholesterolemia  Right-sided low back pain without sciatica, unspecified chronicity    EVALUATION SUMMARY    Physical Therapy Initial Evaluation  Subjective:  Pt describes intermittent right sided LBP.  Worse with walking, standing, lifting.  Has been a chronic issue and flared back up in 2022.  Believes it may be related to how he is walking after having a left TKA in 2022.  Received an injection in his back in April and again in May with little to no improvement.  Will possibly be having an ablasion procedure if PT does not help.  Referred to PT on 22.      The history is provided by the patient.   Therapist Generated HPI Evaluation         Type of problem:  Lumbar.    This is a recurrent condition.  Condition occurred with:  Insidious onset.    Patient reports pain:  SI joint right and lumbar spine right.  Pain is described as aching and stabbing and is intermittent.  Pain radiates to:  No radiation. Pain is worse in the P.M..  Since onset symptoms are gradually worsening.  Associated symptoms:  Loss of motion/stiffness. Symptoms are exacerbated by lifting, twisting, standing and walking  and relieved by heat (sitting, lying down).  Special tests included:  MRI.    Barriers include:  None as reported by patient.    Objective:  Standing Alignment:      Re: Mynor HARISH Antoinette   :   1950    Pelvic:  Iliac crest high R (Possible  LLD)    Gait:  Shortened stride, mild pelvic instability    Assistive Devices:  None       Lumbar/SI Evaluation  ROM:    AROM Lumbar:   Flexion:          Full  Ext:                    Moderate restriction with LBP   Side Bend:        Left:  Moderate restriction    Right:  Moderate restriction with LBP  Rotation:           Left:     Right:   Side Glide:        Left:     Right:           Lumbar Myotomes:  not assessed    Lumbar DTR's:  not assessed    Lumbar Palpation:      Tenderness present at Right: PSIS    Assessment/Plan:    Patient is a 71 year old male with lumbar complaints.    Patient has the following significant findings with corresponding treatment plan.                Diagnosis 1:  LBP  Pain -  hot/cold therapy, mechanical traction, manual therapy, education and home program  Decreased ROM/flexibility - manual therapy, therapeutic exercise and home program    Therapy Evaluation Codes:   1) History comprised of:   Personal factors that impact the plan of care:      None.    Comorbidity factors that impact the plan of care are:      None.     Medications impacting care: None.  2) Examination of Body Systems comprised of:   Body structures and functions that impact the plan of care:      Lumbar spine.   Activity limitations that impact the plan of care are:      Lifting, Standing and Walking.  3) Clinical presentation characteristics are:   Stable/Uncomplicated.  4) Decision-Making    Low complexity using standardized patient assessment instrument and/or measureable assessment of functional outcome.  Cumulative Therapy Evaluation is: Low complexity.    Previous and current functional limitations:  (See Goal Flow Sheet for this information)    Short term and Long term goals: (See Goal Flow Sheet for this information)     Communication ability:  Patient appears to be able to clearly communicate and understand verbal and written communication and follow directions correctly.  Treatment Explanation - The  following has been discussed with the patient:   RX ordered/plan of care  Anticipated outcomes  Possible risks and side effects  This patient would benefit from PT intervention to resume normal activities.   Rehab potential is good.    Frequency:  2 X week, once daily  Duration:  for 3 weeks  Discharge Plan:  Achieve all LTG.  Independent in home treatment program.  Reach maximal therapeutic benefit.    Physical Therapy Initial Evaluation  Subjective:    Patient Health History  Mynor Curry being seen for Chronic Back Issues.     Date of Onset: 4/22.   Problem occurred: Change in gait after knee replacement    Pain is reported as 7/10 on pain scale.  General health as reported by patient is excellent.  Pertinent medical history includes: osteoarthritis.        Surgeries include:  Orthopedic surgery.    Current medications:  Anti-inflammatory and high blood pressure medication.    Current occupation is Retired.                                                                                 [facility]    OUTPATIENT Physical Therapy ORTHOPEDIC EVALUATION  PLAN OF TREATMENT FOR OUTPATIENT REHABILITATION  (COMPLETE FOR INITIAL CLAIMS ONLY)  Patient's Last Name, First Name, M.I.  YOB: 1950  Mynor Curry  HARISH    Provider s Name:  [facility]   Medical Record No.  [id number]   Start of Care Date:  06/24/22   Onset Date:   06/23/22 (MD order)   Type:     _X__PT   ___OT Medical Diagnosis:    Encounter Diagnoses   Name Primary?     Essential hypertension with goal blood pressure less than 140/90      Hyperlipidemia LDL goal <130      Coronary artery disease involving coronary bypass graft of native heart without angina pectoris      Pure hypercholesterolemia      Right-sided low back pain without sciatica, unspecified chronicity         Treatment Diagnosis:  LBP        Goals:     06/24/22 0500   Body Part   Goals listed below are for LB   Goal #1   Goal  #1 ambulation   Previous Functional Level Minutes patient could walk   Performance Level 30+   Current Functional Level Minutes patient can walk   Performance Level <10 with LBP   STG Target Performance Minutes patient will be able to walk   Performance Level 10 with 2/10 LBP or less   Rationale for safe community ambulation   Due Date 07/15/22    LTG Target Performance Minutes patient will be able to  walk   Performance Level 20 painfree   Rationale for safe community ambulation;to promote a healthy and active lifestyle   Due Date 22       Therapy Frequency:  2x/week  Predicted Duration of Therapy Intervention:  3 weeks    Diego Hobson, PT                 I CERTIFY THE NEED FOR THESE SERVICES FURNISHED UNDER        THIS PLAN OF TREATMENT AND WHILE UNDER MY CARE .             Physician Signature               Date    X_____________________________________________________    Re: Mynor Curry   :   1950                       Certification Date From:  22   Certification Date To:  22    Referring Provider:  Ar Meier    Initial Assessment        See Epic Evaluation SOC Date: 22    Thank you for your referral.    INQUIRIES  Therapist:DIEGO HOBSON  [facility]  [id number] [place] [id number]  Phone: [phone]  Fax: [phone]

## 2022-06-27 ENCOUNTER — THERAPY VISIT (OUTPATIENT)
Dept: PHYSICAL THERAPY | Facility: CLINIC | Age: 72
End: 2022-06-27
Payer: COMMERCIAL

## 2022-06-27 DIAGNOSIS — M54.50 RIGHT-SIDED LOW BACK PAIN WITHOUT SCIATICA: Primary | ICD-10-CM

## 2022-06-27 PROCEDURE — 97012 MECHANICAL TRACTION THERAPY: CPT | Mod: GP | Performed by: PHYSICAL THERAPIST

## 2022-06-27 PROCEDURE — 97110 THERAPEUTIC EXERCISES: CPT | Mod: GP | Performed by: PHYSICAL THERAPIST

## 2022-06-27 NOTE — PROGRESS NOTES
Physical Therapy Initial Evaluation  Subjective:    Patient Health History  Mynor Curry being seen for Chronic Back Issues.     Date of Onset: 4/22.   Problem occurred: Change in gait after knee replacement    Pain is reported as 7/10 on pain scale.  General health as reported by patient is excellent.  Pertinent medical history includes: osteoarthritis.        Surgeries include:  Orthopedic surgery.    Current medications:  Anti-inflammatory and high blood pressure medication.    Current occupation is Retired.                                       Objective:  System    Physical Exam    General     ROS    Assessment/Plan:

## 2022-07-01 ENCOUNTER — THERAPY VISIT (OUTPATIENT)
Dept: PHYSICAL THERAPY | Facility: CLINIC | Age: 72
End: 2022-07-01
Payer: COMMERCIAL

## 2022-07-01 DIAGNOSIS — M54.50 RIGHT-SIDED LOW BACK PAIN WITHOUT SCIATICA: Primary | ICD-10-CM

## 2022-07-01 PROCEDURE — 97110 THERAPEUTIC EXERCISES: CPT | Mod: GP | Performed by: PHYSICAL THERAPIST

## 2022-07-05 ENCOUNTER — THERAPY VISIT (OUTPATIENT)
Dept: PHYSICAL THERAPY | Facility: CLINIC | Age: 72
End: 2022-07-05
Payer: COMMERCIAL

## 2022-07-05 DIAGNOSIS — M54.50 RIGHT-SIDED LOW BACK PAIN WITHOUT SCIATICA: Primary | ICD-10-CM

## 2022-07-05 PROCEDURE — 97110 THERAPEUTIC EXERCISES: CPT | Mod: GP | Performed by: PHYSICAL THERAPIST

## 2022-07-11 ENCOUNTER — THERAPY VISIT (OUTPATIENT)
Dept: PHYSICAL THERAPY | Facility: CLINIC | Age: 72
End: 2022-07-11
Payer: COMMERCIAL

## 2022-07-11 DIAGNOSIS — M54.50 RIGHT-SIDED LOW BACK PAIN WITHOUT SCIATICA: Primary | ICD-10-CM

## 2022-07-11 PROCEDURE — 97110 THERAPEUTIC EXERCISES: CPT | Mod: GP | Performed by: PHYSICAL THERAPIST

## 2022-07-13 ENCOUNTER — THERAPY VISIT (OUTPATIENT)
Dept: PHYSICAL THERAPY | Facility: CLINIC | Age: 72
End: 2022-07-13
Payer: COMMERCIAL

## 2022-07-13 DIAGNOSIS — M54.50 RIGHT-SIDED LOW BACK PAIN WITHOUT SCIATICA: Primary | ICD-10-CM

## 2022-07-13 PROCEDURE — 97110 THERAPEUTIC EXERCISES: CPT | Mod: GP | Performed by: PHYSICAL THERAPIST

## 2022-07-13 NOTE — LETTER
[facility]  [address] [facility] [address]  [phone]    2022    Re: Mynor Curry   :   1950  MRN:  [id number]   REFERRING PHYSICIAN:   Ar THOMPSON [facility]    Date of Initial Evaluation: 13  Visits:  Rxs Used: 6  Reason for Referral:  Right-sided low back pain without sciatica    EVALUATION SUMMARY    Assessment/Plan:    DISCHARGE REPORT    Progress reporting period is from 22 to 22.       SUBJECTIVE  Subjective changes noted by patient:  Pt reports he feels stronger after completing therapy but notes little change in right lumbar pain.       Current pain level is 7/10  .     Previous pain level was  7/10  .   Changes in function:  Pt reports pain with lifting, ambulating and standing. Pt is limited to 10 minutes standing and walking 1/4 mile  Adverse reaction to treatment or activity: None    OBJECTIVE  Changes noted in objective findings:  Lumbar extension is limited and painful. Pt demonstrates improved lower abdominal strength and piriformis flexibility        ASSESSMENT/PLAN  Updated problem list and treatment plan: Diagnosis 1:  Lumbar pain  Pain -  hot/cold therapy, mechanical traction, self management, education and home program  Decreased ROM/flexibility - therapeutic exercise, therapeutic activity and home program  Decreased strength - therapeutic exercise, therapeutic activities and home program  STG/LTGs have been met or progress has been made towards goals:  Pt has shown limited progress towards goals  Assessment of Progress: The patient's condition is improving.  Self Management Plans:  Patient has been instructed in a home treatment program.  I have re-evaluated this patient and find that the nature, scope, duration and intensity of the therapy is appropriate for the medical condition of the patient.  Mynor continues to require the following intervention to  meet STG and LTG's:  PT intervention is no longer required to meet STG/LTG.    Recommendations:  This patient would benefit from further evaluation. Pt has not benefited from therapy. Pt discharged at this time    Thank you for your referral.    INQUIRIES  Therapist: ELEUTERIO MOSHER [facility]  [address] [place] [id number]  Phone: [phone]  Fax: [phone]

## 2022-07-13 NOTE — PROGRESS NOTES
Physical Therapy Initial Evaluation  Subjective:  HPI                    Objective:  System    Physical Exam    General     ROS    Assessment/Plan:    DISCHARGE REPORT    Progress reporting period is from 6-24-22 to 7-13-22.       SUBJECTIVE  Subjective changes noted by patient:  Pt reports he feels stronger after completing therapy but notes little change in right lumbar pain.       Current pain level is 7/10  .     Previous pain level was  7/10  .   Changes in function:  Pt reports pain with lifting, ambulating and standing. Pt is limited to 10 minutes standing and walking 1/4 mile  Adverse reaction to treatment or activity: None    OBJECTIVE  Changes noted in objective findings:  Lumbar extension is limited and painful. Pt demonstrates improved lower abdominal strength and piriformis flexibility        ASSESSMENT/PLAN  Updated problem list and treatment plan: Diagnosis 1:  Lumbar pain  Pain -  hot/cold therapy, mechanical traction, self management, education and home program  Decreased ROM/flexibility - therapeutic exercise, therapeutic activity and home program  Decreased strength - therapeutic exercise, therapeutic activities and home program  STG/LTGs have been met or progress has been made towards goals:  Pt has shown limited progress towards goals  Assessment of Progress: The patient's condition is improving.  Self Management Plans:  Patient has been instructed in a home treatment program.  I have re-evaluated this patient and find that the nature, scope, duration and intensity of the therapy is appropriate for the medical condition of the patient.  Mynor continues to require the following intervention to meet STG and LTG's:  PT intervention is no longer required to meet STG/LTG.    Recommendations:  This patient would benefit from further evaluation. Pt has not benefited from therapy. Pt discharged at this time    Please refer to the daily flowsheet for treatment today, total treatment time and time spent  performing 1:1 timed codes.

## 2022-07-15 ENCOUNTER — TRANSFERRED RECORDS (OUTPATIENT)
Dept: HEALTH INFORMATION MANAGEMENT | Facility: CLINIC | Age: 72
End: 2022-07-15

## 2022-08-22 ENCOUNTER — TRANSFERRED RECORDS (OUTPATIENT)
Dept: HEALTH INFORMATION MANAGEMENT | Facility: CLINIC | Age: 72
End: 2022-08-22

## 2022-09-04 ENCOUNTER — HEALTH MAINTENANCE LETTER (OUTPATIENT)
Age: 72
End: 2022-09-04

## 2022-10-06 ENCOUNTER — TRANSFERRED RECORDS (OUTPATIENT)
Dept: HEALTH INFORMATION MANAGEMENT | Facility: CLINIC | Age: 72
End: 2022-10-06

## 2022-11-18 ENCOUNTER — LAB (OUTPATIENT)
Dept: LAB | Facility: CLINIC | Age: 72
End: 2022-11-18
Payer: COMMERCIAL

## 2022-11-18 DIAGNOSIS — I10 ESSENTIAL HYPERTENSION WITH GOAL BLOOD PRESSURE LESS THAN 140/90: ICD-10-CM

## 2022-11-18 DIAGNOSIS — I25.810 CORONARY ARTERY DISEASE INVOLVING CORONARY BYPASS GRAFT OF NATIVE HEART WITHOUT ANGINA PECTORIS: ICD-10-CM

## 2022-11-18 LAB
ALT SERPL W P-5'-P-CCNC: 34 U/L (ref 10–50)
ANION GAP SERPL CALCULATED.3IONS-SCNC: 13 MMOL/L (ref 7–15)
BUN SERPL-MCNC: 28.2 MG/DL (ref 8–23)
CALCIUM SERPL-MCNC: 9.7 MG/DL (ref 8.8–10.2)
CHLORIDE SERPL-SCNC: 101 MMOL/L (ref 98–107)
CREAT SERPL-MCNC: 0.93 MG/DL (ref 0.67–1.17)
DEPRECATED HCO3 PLAS-SCNC: 24 MMOL/L (ref 22–29)
GFR SERPL CREATININE-BSD FRML MDRD: 86 ML/MIN/1.73M2
GLUCOSE SERPL-MCNC: 98 MG/DL (ref 70–99)
POTASSIUM SERPL-SCNC: 4.2 MMOL/L (ref 3.4–5.3)
SODIUM SERPL-SCNC: 138 MMOL/L (ref 136–145)

## 2022-11-18 PROCEDURE — 84460 ALANINE AMINO (ALT) (SGPT): CPT | Performed by: INTERNAL MEDICINE

## 2022-11-18 PROCEDURE — 80048 BASIC METABOLIC PNL TOTAL CA: CPT | Performed by: INTERNAL MEDICINE

## 2022-11-18 PROCEDURE — 80061 LIPID PANEL: CPT | Performed by: INTERNAL MEDICINE

## 2022-11-18 PROCEDURE — 36415 COLL VENOUS BLD VENIPUNCTURE: CPT | Performed by: INTERNAL MEDICINE

## 2022-11-19 LAB
CHOLEST SERPL-MCNC: 131 MG/DL
HDLC SERPL-MCNC: 57 MG/DL
LDLC SERPL CALC-MCNC: 59 MG/DL
NONHDLC SERPL-MCNC: 74 MG/DL
TRIGL SERPL-MCNC: 76 MG/DL

## 2022-11-21 DIAGNOSIS — I25.810 CORONARY ARTERY DISEASE INVOLVING CORONARY BYPASS GRAFT OF NATIVE HEART WITHOUT ANGINA PECTORIS: ICD-10-CM

## 2022-11-21 DIAGNOSIS — I10 ESSENTIAL HYPERTENSION WITH GOAL BLOOD PRESSURE LESS THAN 140/90: Primary | ICD-10-CM

## 2022-11-21 DIAGNOSIS — E78.00 PURE HYPERCHOLESTEROLEMIA: ICD-10-CM

## 2022-11-21 DIAGNOSIS — E78.5 HYPERLIPIDEMIA LDL GOAL <130: ICD-10-CM

## 2022-12-20 ENCOUNTER — OFFICE VISIT (OUTPATIENT)
Dept: CARDIOLOGY | Facility: CLINIC | Age: 72
End: 2022-12-20
Payer: COMMERCIAL

## 2022-12-20 VITALS
HEIGHT: 68 IN | WEIGHT: 183 LBS | HEART RATE: 77 BPM | BODY MASS INDEX: 27.74 KG/M2 | SYSTOLIC BLOOD PRESSURE: 123 MMHG | DIASTOLIC BLOOD PRESSURE: 78 MMHG

## 2022-12-20 DIAGNOSIS — I10 ESSENTIAL HYPERTENSION WITH GOAL BLOOD PRESSURE LESS THAN 140/90: ICD-10-CM

## 2022-12-20 DIAGNOSIS — I25.810 CORONARY ARTERY DISEASE INVOLVING CORONARY BYPASS GRAFT OF NATIVE HEART WITHOUT ANGINA PECTORIS: ICD-10-CM

## 2022-12-20 DIAGNOSIS — E78.5 HYPERLIPIDEMIA LDL GOAL <130: ICD-10-CM

## 2022-12-20 DIAGNOSIS — E78.00 PURE HYPERCHOLESTEROLEMIA: ICD-10-CM

## 2022-12-20 PROCEDURE — 99214 OFFICE O/P EST MOD 30 MIN: CPT | Performed by: PHYSICIAN ASSISTANT

## 2022-12-20 RX ORDER — ROSUVASTATIN CALCIUM 40 MG/1
40 TABLET, COATED ORAL DAILY
Qty: 90 TABLET | Refills: 3 | Status: SHIPPED | OUTPATIENT
Start: 2022-12-20 | End: 2024-01-19

## 2022-12-20 NOTE — LETTER
12/20/2022    [facility]  [address] [facility] [address]    RE: Mynor Curry       Dear Colleague,     I had the pleasure of seeing Mynor Curry in the [facility].    Cardiology Clinic Progress Note    Mynor Curry MRN# [id number]   YOB: 1950 Age: 72 year old     Primary cardiologist: Dr. Ayan Lu         Assessment and Plan     In summary, Mynor Curry presents today for a routine annual follow-up visit.    1. Nonobstructive coronary artery disease per CT angiogram in 2015.  Denies angina. On asa/statin therapy.  2. Controlled hypertension.  3. Controlled hyperlipidemia.  4. Vasovagal syncope. Avoiding triggers.   5. Aortic sclerosis with an MR murmur on exam.     Plan:  -No changes recommended today.   - TTE at his convenience.    Follow-up:  -With Dr. Ayan Lu in 1 year, with a repeat fasting lipid panel prior.    [facility]         History of Presenting Illness     Mynor Curry is a pleasant 72 year old patient with a pertinent history of the following -   1.  Nonobstructive CAD, per CT angiogram in 2015 (Total Agatston score 312 placing the patient in the 69 percentile, majority in the LAD).  Of note, he had a false positive Lexiscan stress test prior to that study.  2.  History of vasovagal syncope  3.  Hypertension  4.  Hyperlipidemia    Today, I am meeting Al for the first time in clinic, he is very pleasant. He unfortunately was seen in the ER in March of this year after he took a larger than prescribed dose of Viagra and experienced syncope. Otherwise, no syncope. He notes that pain can also be a trigger for near-syncope but he is able to ameliorate symptoms early on by treating his pain and getting to the ground. He plays pickleball and walks routinely without problem. Patient denies chest pain, shortness of breath, PND, orthopnea, edema, claudication, palpitations. Lipid panel is under  "excellent control, with an LDL of 59, HDL 57, total cholesterol 131, triglycerides 76.  His renal function is normal. His weight is unfortunately up 10 lbs from last year. He recently got back from visiting his daughter in [place] and thinks his diet slipped there and that caused it. Next week travels to [place] for about a month. At home, he checks his BP routinely and obtains 120's/70's.   Last TTE was in 2015 showing an EF of 50-55% with mild to moderate basal inferior wall hypokinesis, moderate aortic valve sclerosis with no stenosis, and trace AI.         Review of Systems     12-pt ROS is negative except for as noted in the HPI.          Physical Exam     Vitals: BP (!) 140/78   Pulse 77   Ht 1.727 m (5' 8\")   Wt 83 kg (183 lb)   BMI 27.83 kg/m    Wt Readings from Last 10 Encounters:   12/20/22 83 kg (183 lb)   03/02/22 73.9 kg (163 lb)   11/19/21 78.2 kg (172 lb 4.8 oz)   08/03/21 74.8 kg (165 lb)   02/11/20 80.3 kg (177 lb)   07/29/19 76.2 kg (168 lb)   06/04/19 76.2 kg (168 lb)   02/20/19 81.6 kg (180 lb)   01/23/19 81.2 kg (179 lb)   10/15/18 81.6 kg (180 lb)       Constitutional:  Patient is pleasant, alert, cooperative, and in NAD.  HEENT:  NCAT. PERRLA. EOM's intact.   Neck:  CVP appears normal. No carotid bruits.   Pulmonary: Normal respiratory effort. CTAB.   Cardiac: RRR, normal S1/S2, no S3/S4, grade 3/6 harsh SM at the RUSB, as well as the LSB --> axilla  Abdomen:  Non-tender abdomen, no hepatosplenomegaly appreciated.   Vascular: Pulses in the upper and lower extremities are 2+ and equal bilaterally.  Extremities: No edema, erythema, cyanosis or tenderness appreciated.  Skin:  No rashes or lesions appreciated.   Neurological:  No gross motor or sensory deficits.   Psych: Appropriate affect.          Data   Labs reviewed:  Recent Labs   Lab Test 11/18/22  0825 12/28/21  0829 09/24/20  0758 02/20/20  0854 11/04/15  0829 12/22/14  1504   LDL 59 71 62  --    < >  --    HDL 57 71 60  --    < >  " --    NHDL 74 96 79  --    < >  --    CHOL 131 167 139  --    < >  --    TRIG 76 125 87  --    < >  --    TSH  --   --   --   --   --  1.77   IRON  --   --   --  78  --   --    FEB  --   --   --  397  --   --    IRONSAT  --   --   --  20  --   --    GRIFFIN  --   --   --  63  --   --     < > = values in this interval not displayed.       Lab Results   Component Value Date    WBC 8.1 03/02/2022    WBC 5.9 02/20/2020    RBC 3.40 (L) 03/02/2022    RBC 4.45 02/20/2020    HGB 10.3 (L) 03/02/2022    HGB 14.4 02/20/2020    HCT 31.9 (L) 03/02/2022    HCT 42.9 02/20/2020    MCV 94 03/02/2022    MCV 96 02/20/2020    MCH 30.3 03/02/2022    MCH 32.4 02/20/2020    MCHC 32.3 03/02/2022    MCHC 33.6 02/20/2020    RDW 13.2 03/02/2022    RDW 12.0 02/20/2020     (H) 03/02/2022     02/20/2020       Lab Results   Component Value Date     11/18/2022     09/24/2020    POTASSIUM 4.2 11/18/2022    POTASSIUM 4.9 03/02/2022    POTASSIUM 3.7 09/24/2020    CHLORIDE 101 11/18/2022    CHLORIDE 105 03/02/2022    CHLORIDE 105 09/24/2020    CO2 24 11/18/2022    CO2 24 03/02/2022    CO2 26 09/24/2020    ANIONGAP 13 11/18/2022    ANIONGAP 8 03/02/2022    ANIONGAP 5 09/24/2020    GLC 98 11/18/2022     (H) 03/02/2022    GLC 90 09/24/2020    BUN 28.2 (H) 11/18/2022    BUN 21 03/02/2022    BUN 24 09/24/2020    CR 0.93 11/18/2022    CR 0.97 09/24/2020    GFRESTIMATED 86 11/18/2022    GFRESTIMATED 78 09/24/2020    GFRESTBLACK 90 09/24/2020    DEENA 9.7 11/18/2022    DEENA 8.8 09/24/2020      Lab Results   Component Value Date    AST 29 03/02/2022    AST 20 02/11/2020    ALT 34 11/18/2022    ALT 36 09/24/2020       No results found for: A1C    No results found for: INR         Problem List     Patient Active Problem List   Diagnosis     Low back pain     S/P prosthetic total arthroplasty of the hip     Erectile dysfunction due to arterial insufficiency     Advanced directives, counseling/discussion     Essential hypertension with  goal blood pressure less than 140/90     Benign non-nodular prostatic hyperplasia with lower urinary tract symptoms     Coronary artery disease     Hyperlipidemia LDL goal <70     Cardiomyopathy in diseases classified elsewhere (H)     Degenerative arthritis of finger     Right-sided low back pain without sciatica            Medications     Current Outpatient Medications   Medication Sig Dispense Refill     aspirin EC 81 MG tablet Take 1 tablet (81 mg) by mouth daily       Cholecalciferol (VITAMIN D) 2000 UNITS CAPS Take by mouth daily       gabapentin (NEURONTIN) 300 MG capsule Take 300 mg by mouth 2 times daily        Ibuprofen (ADVIL PO) Take 400 mg by mouth every 4 hours as needed        losartan-hydrochlorothiazide (HYZAAR) 100-12.5 MG tablet Take 1 tablet by mouth daily 90 tablet 3     Multiple Vitamins-Minerals (DAILY MULTIVITAMIN PO) Take by mouth daily       Multiple Vitamins-Minerals (LUTEIN-ZEAXANTHIN PO)        omeprazole (PRILOSEC) 20 MG DR capsule TAKE 1 CAPSULE (20 MG) BY MOUTH DAILY 30 capsule 1     rosuvastatin (CRESTOR) 40 MG tablet TAKE 1 TABLET EVERY DAY 90 tablet 3     sildenafil (VIAGRA) 100 MG tablet Take 0.5-1 tablets ( mg) by mouth daily as needed 6 tablet 11     valACYclovir (VALTREX) 500 MG tablet TAKE ONE TABLET BY MOUTH TWICE DAILY FOR THREE DAYS FOR EACH OUTBREAK 30 tablet 1            Past Medical History     Past Medical History:   Diagnosis Date     Arthritis      Coronary artery disease     Mild     Erectile dysfunction due to arterial insufficiency 8/31/2015     Hyperlipidemia LDL goal <70 2/4/2017     Hypertension goal BP (blood pressure) < 140/90 7/17/2013    Since 2006      Syncope     vasovagal     Past Surgical History:   Procedure Laterality Date     ARTHROPLASTY HIP ANTERIOR  4/30/2014    Procedure: ARTHROPLASTY HIP ANTERIOR;  Surgeon: Ayaz Butcher MD;  Location: [facility] OR     ARTHROSCOPY KNEE WITH MEDIAL MENISCECTOMY  2001    left     ARTHROSCOPY SHOULDER   1999    right     COLONOSCOPY       HEMILAMINECTOMY, DISCECTOMY LUMBAR ONE LEVEL, COMBINED  1989     HERNIA REPAIR, INGUINAL RT/LT  2011    bilateral     ORTHOPEDIC SURGERY       Family History   Problem Relation Age of Onset     Arthritis Mother      Hypertension Mother      Arthritis Father      Hypertension Father      Arrhythmia Father         atrial fib     C.A.D. No family hx of      Diabetes No family hx of      Prostate Cancer No family hx of      Cancer - colorectal No family hx of         mother with some polyps though     Social History     Socioeconomic History     Marital status:      Spouse name: Melanie     Number of children: 3     Years of education: Not on file     Highest education level: Not on file   Occupational History     Occupation:      Employer: RETIRED   Tobacco Use     Smoking status: Never     Smokeless tobacco: Never   Substance and Sexual Activity     Alcohol use: Yes     Alcohol/week: 0.0 standard drinks     Comment: 2-3 drinks per week     Drug use: No     Sexual activity: Yes     Partners: Female     Birth control/protection: Condom   Other Topics Concern     Parent/sibling w/ CABG, MI or angioplasty before 65F 55M? No      Service No     Blood Transfusions No     Caffeine Concern No     Comment: Hot tea daily - 2 cups     Occupational Exposure No     Hobby Hazards No     Sleep Concern No     Stress Concern No     Weight Concern No     Special Diet No     Back Care No     Exercise Yes     Comment: biking- 2-3 days per week  on average     Bike Helmet Yes     Seat Belt Yes     Self-Exams Yes   Social History Narrative     Not on file     Social Determinants of Health     Financial Resource Strain: Not on file   Food Insecurity: Not on file   Transportation Needs: Not on file   Physical Activity: Not on file   Stress: Not on file   Social Connections: Not on file   Intimate Partner Violence: Not on file   Housing Stability: Not on file             Allergies   Augmentin    Today's clinic visit entailed:  Review of the result(s) of each unique test - echocardiogram, lipid panel, BMP, CT angiogram  Ordering of each unique test  I spent a total of 30 minutes on the day of the visit.   Time spent doing chart review, history and exam, documentation and further activities per the note  Provider  Link to [facility] Help Grid     The level of medical decision making during this visit was of moderate complexity.        Thank you for allowing me to participate in the care of your patient.      Sincerely,     Thu Barksdale PA-C     [facility]  cc:   Alfonso Ng MD  [address]  [place] [address]

## 2022-12-20 NOTE — PATIENT INSTRUCTIONS
Today's Plan:   - Echo at your convenience.   - Return in one year with another set of labs prior.      If you have questions or concerns please call my nurse team at [phone].   Scheduling phone number: [phone]  For after hours urgent concerns call [phone] option 2.   Reminder: Please bring in all current medications, over the counter supplements and vitamin bottles to your next appointment.    It was a pleasure seeing you today!     Thu Barksdale PA-C

## 2022-12-20 NOTE — PROGRESS NOTES
Cardiology Clinic Progress Note    Mynor Curry MRN# [id number]   YOB: 1950 Age: 72 year old     Primary cardiologist: Dr. Ayan Lu         Assessment and Plan     In summary, Mynor Curry presents today for a routine annual follow-up visit.    1. Nonobstructive coronary artery disease per CT angiogram in 2015.  Denies angina. On asa/statin therapy.  2. Controlled hypertension.  3. Controlled hyperlipidemia.  4. Vasovagal syncope. Avoiding triggers.   5. Aortic sclerosis with an MR murmur on exam.     Plan:  -No changes recommended today.   - TTE at his convenience.    Follow-up:  -With Dr. Ayan Lu in 1 year, with a repeat fasting lipid panel prior.    [facility]         History of Presenting Illness     Mynor Curry is a pleasant 72 year old patient with a pertinent history of the following -   1.  Nonobstructive CAD, per CT angiogram in 2015 (Total Agatston score 312 placing the patient in the 69 percentile, majority in the LAD).  Of note, he had a false positive Lexiscan stress test prior to that study.  2.  History of vasovagal syncope  3.  Hypertension  4.  Hyperlipidemia    Today, I am meeting Al for the first time in clinic, he is very pleasant. He unfortunately was seen in the ER in March of this year after he took a larger than prescribed dose of Viagra and experienced syncope. Otherwise, no syncope. He notes that pain can also be a trigger for near-syncope but he is able to ameliorate symptoms early on by treating his pain and getting to the ground. He plays pickleball and walks routinely without problem. Patient denies chest pain, shortness of breath, PND, orthopnea, edema, claudication, palpitations. Lipid panel is under excellent control, with an LDL of 59, HDL 57, total cholesterol 131, triglycerides 76.  His renal function is normal. His weight is unfortunately up 10 lbs from last year. He recently got back from visiting his  "daughter in [place] and thinks his diet slipped there and that caused it. Next week travels to [place] for about a month. At home, he checks his BP routinely and obtains 120's/70's.   Last TTE was in 2015 showing an EF of 50-55% with mild to moderate basal inferior wall hypokinesis, moderate aortic valve sclerosis with no stenosis, and trace AI.         Review of Systems     12-pt ROS is negative except for as noted in the HPI.          Physical Exam     Vitals: BP (!) 140/78   Pulse 77   Ht 1.727 m (5' 8\")   Wt 83 kg (183 lb)   BMI 27.83 kg/m    Wt Readings from Last 10 Encounters:   12/20/22 83 kg (183 lb)   03/02/22 73.9 kg (163 lb)   11/19/21 78.2 kg (172 lb 4.8 oz)   08/03/21 74.8 kg (165 lb)   02/11/20 80.3 kg (177 lb)   07/29/19 76.2 kg (168 lb)   06/04/19 76.2 kg (168 lb)   02/20/19 81.6 kg (180 lb)   01/23/19 81.2 kg (179 lb)   10/15/18 81.6 kg (180 lb)       Constitutional:  Patient is pleasant, alert, cooperative, and in NAD.  HEENT:  NCAT. PERRLA. EOM's intact.   Neck:  CVP appears normal. No carotid bruits.   Pulmonary: Normal respiratory effort. CTAB.   Cardiac: RRR, normal S1/S2, no S3/S4, grade 3/6 harsh SM at the RUSB, as well as the LSB --> axilla  Abdomen:  Non-tender abdomen, no hepatosplenomegaly appreciated.   Vascular: Pulses in the upper and lower extremities are 2+ and equal bilaterally.  Extremities: No edema, erythema, cyanosis or tenderness appreciated.  Skin:  No rashes or lesions appreciated.   Neurological:  No gross motor or sensory deficits.   Psych: Appropriate affect.          Data   Labs reviewed:  Recent Labs   Lab Test 11/18/22  0825 12/28/21  0829 09/24/20  0758 02/20/20  0854 11/04/15  0829 12/22/14  1504   LDL 59 71 62  --    < >  --    HDL 57 71 60  --    < >  --    NHDL 74 96 79  --    < >  --    CHOL 131 167 139  --    < >  --    TRIG 76 125 87  --    < >  --    TSH  --   --   --   --   --  1.77   IRON  --   --   --  78  --   --    FEB  --   --   --  397  --   --  "   IRONSAT  --   --   --  20  --   --    GRIFFIN  --   --   --  63  --   --     < > = values in this interval not displayed.       Lab Results   Component Value Date    WBC 8.1 03/02/2022    WBC 5.9 02/20/2020    RBC 3.40 (L) 03/02/2022    RBC 4.45 02/20/2020    HGB 10.3 (L) 03/02/2022    HGB 14.4 02/20/2020    HCT 31.9 (L) 03/02/2022    HCT 42.9 02/20/2020    MCV 94 03/02/2022    MCV 96 02/20/2020    MCH 30.3 03/02/2022    MCH 32.4 02/20/2020    MCHC 32.3 03/02/2022    MCHC 33.6 02/20/2020    RDW 13.2 03/02/2022    RDW 12.0 02/20/2020     (H) 03/02/2022     02/20/2020       Lab Results   Component Value Date     11/18/2022     09/24/2020    POTASSIUM 4.2 11/18/2022    POTASSIUM 4.9 03/02/2022    POTASSIUM 3.7 09/24/2020    CHLORIDE 101 11/18/2022    CHLORIDE 105 03/02/2022    CHLORIDE 105 09/24/2020    CO2 24 11/18/2022    CO2 24 03/02/2022    CO2 26 09/24/2020    ANIONGAP 13 11/18/2022    ANIONGAP 8 03/02/2022    ANIONGAP 5 09/24/2020    GLC 98 11/18/2022     (H) 03/02/2022    GLC 90 09/24/2020    BUN 28.2 (H) 11/18/2022    BUN 21 03/02/2022    BUN 24 09/24/2020    CR 0.93 11/18/2022    CR 0.97 09/24/2020    GFRESTIMATED 86 11/18/2022    GFRESTIMATED 78 09/24/2020    GFRESTBLACK 90 09/24/2020    DEENA 9.7 11/18/2022    DEENA 8.8 09/24/2020      Lab Results   Component Value Date    AST 29 03/02/2022    AST 20 02/11/2020    ALT 34 11/18/2022    ALT 36 09/24/2020       No results found for: A1C    No results found for: INR         Problem List     Patient Active Problem List   Diagnosis     Low back pain     S/P prosthetic total arthroplasty of the hip     Erectile dysfunction due to arterial insufficiency     Advanced directives, counseling/discussion     Essential hypertension with goal blood pressure less than 140/90     Benign non-nodular prostatic hyperplasia with lower urinary tract symptoms     Coronary artery disease     Hyperlipidemia LDL goal <70     Cardiomyopathy in diseases  classified elsewhere (H)     Degenerative arthritis of finger     Right-sided low back pain without sciatica            Medications     Current Outpatient Medications   Medication Sig Dispense Refill     aspirin EC 81 MG tablet Take 1 tablet (81 mg) by mouth daily       Cholecalciferol (VITAMIN D) 2000 UNITS CAPS Take by mouth daily       gabapentin (NEURONTIN) 300 MG capsule Take 300 mg by mouth 2 times daily        Ibuprofen (ADVIL PO) Take 400 mg by mouth every 4 hours as needed        losartan-hydrochlorothiazide (HYZAAR) 100-12.5 MG tablet Take 1 tablet by mouth daily 90 tablet 3     Multiple Vitamins-Minerals (DAILY MULTIVITAMIN PO) Take by mouth daily       Multiple Vitamins-Minerals (LUTEIN-ZEAXANTHIN PO)        omeprazole (PRILOSEC) 20 MG DR capsule TAKE 1 CAPSULE (20 MG) BY MOUTH DAILY 30 capsule 1     rosuvastatin (CRESTOR) 40 MG tablet TAKE 1 TABLET EVERY DAY 90 tablet 3     sildenafil (VIAGRA) 100 MG tablet Take 0.5-1 tablets ( mg) by mouth daily as needed 6 tablet 11     valACYclovir (VALTREX) 500 MG tablet TAKE ONE TABLET BY MOUTH TWICE DAILY FOR THREE DAYS FOR EACH OUTBREAK 30 tablet 1            Past Medical History     Past Medical History:   Diagnosis Date     Arthritis      Coronary artery disease     Mild     Erectile dysfunction due to arterial insufficiency 8/31/2015     Hyperlipidemia LDL goal <70 2/4/2017     Hypertension goal BP (blood pressure) < 140/90 7/17/2013    Since 2006      Syncope     vasovagal     Past Surgical History:   Procedure Laterality Date     ARTHROPLASTY HIP ANTERIOR  4/30/2014    Procedure: ARTHROPLASTY HIP ANTERIOR;  Surgeon: Ayaz Butcher MD;  Location: [facility] OR     ARTHROSCOPY KNEE WITH MEDIAL MENISCECTOMY  2001    left     ARTHROSCOPY SHOULDER  1999    right     COLONOSCOPY       HEMILAMINECTOMY, DISCECTOMY LUMBAR ONE LEVEL, COMBINED  1989     HERNIA REPAIR, INGUINAL RT/LT  2011    bilateral     ORTHOPEDIC SURGERY       Family History   Problem  Relation Age of Onset     Arthritis Mother      Hypertension Mother      Arthritis Father      Hypertension Father      Arrhythmia Father         atrial fib     C.A.D. No family hx of      Diabetes No family hx of      Prostate Cancer No family hx of      Cancer - colorectal No family hx of         mother with some polyps though     Social History     Socioeconomic History     Marital status:      Spouse name: Melanie     Number of children: 3     Years of education: Not on file     Highest education level: Not on file   Occupational History     Occupation:      Employer: RETIRED   Tobacco Use     Smoking status: Never     Smokeless tobacco: Never   Substance and Sexual Activity     Alcohol use: Yes     Alcohol/week: 0.0 standard drinks     Comment: 2-3 drinks per week     Drug use: No     Sexual activity: Yes     Partners: Female     Birth control/protection: Condom   Other Topics Concern     Parent/sibling w/ CABG, MI or angioplasty before 65F 55M? No      Service No     Blood Transfusions No     Caffeine Concern No     Comment: Hot tea daily - 2 cups     Occupational Exposure No     Hobby Hazards No     Sleep Concern No     Stress Concern No     Weight Concern No     Special Diet No     Back Care No     Exercise Yes     Comment: biking- 2-3 days per week  on average     Bike Helmet Yes     Seat Belt Yes     Self-Exams Yes   Social History Narrative     Not on file     Social Determinants of Health     Financial Resource Strain: Not on file   Food Insecurity: Not on file   Transportation Needs: Not on file   Physical Activity: Not on file   Stress: Not on file   Social Connections: Not on file   Intimate Partner Violence: Not on file   Housing Stability: Not on file            Allergies   Augmentin    Today's clinic visit entailed:  Review of the result(s) of each unique test - echocardiogram, lipid panel, BMP, CT angiogram  Ordering of each unique test  I spent a total of 30 minutes  on the day of the visit.   Time spent doing chart review, history and exam, documentation and further activities per the note  Provider  Link to MDM Help Grid     The level of medical decision making during this visit was of moderate complexity.

## 2022-12-22 ENCOUNTER — TRANSFERRED RECORDS (OUTPATIENT)
Dept: HEALTH INFORMATION MANAGEMENT | Facility: CLINIC | Age: 72
End: 2022-12-22

## 2023-01-21 ENCOUNTER — HEALTH MAINTENANCE LETTER (OUTPATIENT)
Age: 73
End: 2023-01-21

## 2023-02-21 ENCOUNTER — HOSPITAL ENCOUNTER (OUTPATIENT)
Dept: CARDIOLOGY | Facility: CLINIC | Age: 73
Discharge: HOME | End: 2023-02-21
Attending: PHYSICIAN ASSISTANT | Admitting: PHYSICIAN ASSISTANT
Payer: COMMERCIAL

## 2023-02-21 DIAGNOSIS — I10 ESSENTIAL HYPERTENSION WITH GOAL BLOOD PRESSURE LESS THAN 140/90: ICD-10-CM

## 2023-02-21 LAB — LVEF ECHO: NORMAL

## 2023-02-21 PROCEDURE — 93306 TTE W/DOPPLER COMPLETE: CPT

## 2023-02-21 PROCEDURE — 93306 TTE W/DOPPLER COMPLETE: CPT | Mod: 26 | Performed by: INTERNAL MEDICINE

## 2023-05-12 ENCOUNTER — TRANSFERRED RECORDS (OUTPATIENT)
Dept: HEALTH INFORMATION MANAGEMENT | Facility: CLINIC | Age: 73
End: 2023-05-12
Payer: COMMERCIAL

## 2023-05-25 NOTE — TELEPHONE ENCOUNTER
"Requested Prescriptions   Pending Prescriptions Disp Refills     rosuvastatin (CRESTOR) 20 MG tablet  Last Written Prescription Date:  1/16/18  Last Fill Quantity: 90,  # refills: 3   Last office visit: 6/12/2018 with prescribing provider:  Rayray   Future Office Visit:     90 tablet 3     Sig: Take 1 tablet (20 mg) by mouth daily    Statins Protocol Passed    9/20/2018  3:43 PM       Passed - LDL on file in past 12 months    Recent Labs   Lab Test  01/16/18   1150   LDL  47          Passed - No abnormal creatine kinase in past 12 months    No lab results found.        Passed - Recent (12 mo) or future (30 days) visit within the authorizing provider's specialty    Patient had office visit in the last 12 months or has a visit in the next 30 days with authorizing provider or within the authorizing provider's specialty.  See \"Patient Info\" tab in inbasket, or \"Choose Columns\" in Meds & Orders section of the refill encounter.           Passed - Patient is age 18 or older        valACYclovir (VALTREX) 500 MG tablet  Last Written Prescription Date:  1/16/18  Last Fill Quantity: 30,  # refills: 3   Last office visit: 6/12/2018 with prescribing provider:  Rayray   Future Office Visit:     30 tablet 3     Sig: One tablet by mouth twice daily for three days for each outbreak    Antivirals for Herpes Protocol Passed    9/20/2018  3:43 PM       Passed - Patient is age 12 or older       Passed - Recent (12 mo) or future (30 days) visit within the authorizing provider's specialty    Patient had office visit in the last 12 months or has a visit in the next 30 days with authorizing provider or within the authorizing provider's specialty.  See \"Patient Info\" tab in inbasket, or \"Choose Columns\" in Meds & Orders section of the refill encounter.           Passed - Normal serum creatinine on file in past 12 months    Recent Labs   Lab Test  04/05/18   1119   CR  0.77               " Qbrexza Counseling:  I discussed with the patient the risks of Qbrexza including but not limited to headache, mydriasis, blurred vision, dry eyes, nasal dryness, dry mouth, dry throat, dry skin, urinary hesitation, and constipation.  Local skin reactions including erythema, burning, stinging, and itching can also occur.

## 2023-08-23 ENCOUNTER — TRANSFERRED RECORDS (OUTPATIENT)
Dept: HEALTH INFORMATION MANAGEMENT | Facility: CLINIC | Age: 73
End: 2023-08-23
Payer: COMMERCIAL

## 2023-09-28 ENCOUNTER — TRANSCRIBE ORDERS (OUTPATIENT)
Dept: OTHER | Age: 73
End: 2023-09-28

## 2023-09-28 DIAGNOSIS — S86.219A RUPTURE OF TIBIALIS ANTERIOR TENDON: Primary | ICD-10-CM

## 2023-09-29 ENCOUNTER — THERAPY VISIT (OUTPATIENT)
Dept: PHYSICAL THERAPY | Facility: CLINIC | Age: 73
End: 2023-09-29
Payer: COMMERCIAL

## 2023-09-29 DIAGNOSIS — S86.219A RUPTURE OF TIBIALIS ANTERIOR TENDON: ICD-10-CM

## 2023-09-29 PROCEDURE — 97035 APP MDLTY 1+ULTRASOUND EA 15: CPT | Mod: GP | Performed by: PHYSICAL THERAPIST

## 2023-09-29 PROCEDURE — 97161 PT EVAL LOW COMPLEX 20 MIN: CPT | Mod: GP | Performed by: PHYSICAL THERAPIST

## 2023-09-29 PROCEDURE — 97110 THERAPEUTIC EXERCISES: CPT | Mod: GP | Performed by: PHYSICAL THERAPIST

## 2023-09-29 NOTE — PROGRESS NOTES
PHYSICAL THERAPY EVALUATION  Type of Visit: Evaluation    See electronic medical record for Abuse and Falls Screening details.    Subjective     Pt presents to PT with the diagnosis of a left anterior tibialis rupture.  Pt states that he began having pain in the area just proximal to the left 1st MTP joint about 6 weeks ago.  He thought it was the 1st MTP joint due to a hx of a bunion there.  It became more painful and the pain began to be felt more proximally toward the left anterior ankle and lower shin.  He also noted foot drop sxs.  He had an US of the area and it was discovered that he had torn the anterior tibialis tendon.  No surgery is scheduled.  He has been fit for an AFO and referred to PT on 9/26/23.        Presenting condition or subjective complaint: Ruptured tibialis anterior tendon  Date of onset: 09/26/23 (MD order)    Relevant medical history:     Dates & types of surgery: Total knee left 2/11/22  Trigger finger release third and fourth finger bilateral 11 and 12 of 22    Prior diagnostic imaging/testing results: X-ray; Other Ultrasound   Prior therapy history for the same diagnosis, illness or injury: No      Prior Level of Function  Transfers: Independent  Ambulation: Independent  ADL: Independent  IADL:     Living Environment  Social support: With a significant other or spouse   Type of home: House; 1 level; Basement   Stairs to enter the home: Yes 4 Is there a railing: Yes   Ramp: No   Stairs inside the home: Yes 13 Is there a railing: Yes   Help at home: Self Cares (home health aide/personal care attendant, family, etc)  Equipment owned: Straight Cane; Crutches; Standard wheelchair     Employment: Not Applicable    Hobbies/Interests: Yard work, biking, kayaking,fishing, pickle ball    Patient goals for therapy: Walk more normally and for a reasonable distance (2miles)    Pain assessment: See objective evaluation for additional pain details     Objective   FOOT/ANKLE EVALUATION  PAIN: Pain  Level at Rest: 0/10  Pain Level with Use: 4/10  Pain Location: dorsum of foot, anterior ankle, area of the anterior tibialis muscle  Pain is Exacerbated By: walking  INTEGUMENTARY (edema, incisions):  mild edema and redness in the ant ankle and dorsum of the foot  POSTURE:   GAIT:   Weightbearing Status: WBAT  Assistive Device(s): None  Gait Deviations:  mild drop foot on left  BALANCE/PROPRIOCEPTION:   WEIGHT BEARING ALIGNMENT:   NON-WEIGHTBEARING ALIGNMENT:    ROM:  Left ankle DF=0; Right DF=13    STRENGTH:  Left DF=3/5, PF=4+/5, Inv and ever=4+/5.  Right all 5/5.  FLEXIBILITY:  tight G/S  SPECIAL TESTS:   FUNCTIONAL TESTS:   PALPATION:  left ant tib muscle tender, slight tenderness in area of the distal ant tib tendon insertion  JOINT MOBILITY:     Assessment & Plan   CLINICAL IMPRESSIONS  Medical Diagnosis: Rupture of the left Anterior Tibialis tendon    Treatment Diagnosis: Rupture of the left Anterior Tibialis tendon   Impression/Assessment: Patient is a 73 year old male with left foot/ankle complaints.  The following significant findings have been identified: Pain, Decreased ROM/flexibility, and Decreased strength. These impairments interfere with their ability to perform self care tasks, recreational activities, household chores, household mobility, and community mobility as compared to previous level of function.     Clinical Decision Making (Complexity):  Clinical Presentation: Stable/Uncomplicated  Clinical Presentation Rationale: based on medical and personal factors listed in PT evaluation  Clinical Decision Making (Complexity): Low complexity    PLAN OF CARE  Treatment Interventions:  Modalities: Cryotherapy, Ultrasound  Interventions: Manual Therapy, Therapeutic Exercise    Long Term Goals     PT Goal 1  Goal Identifier: Walking  Goal Description: Ability to walk 15 minutes without pain or tripping  Rationale: to maximize safety and independence with performance of ADLs and functional tasks;to  maximize safety and independence within the community  Target Date: 11/24/23      Frequency of Treatment: 1x/week  Duration of Treatment: 8 weeks    Recommended Referrals to Other Professionals:   Education Assessment:        Risks and benefits of evaluation/treatment have been explained.   Patient/Family/caregiver agrees with Plan of Care.     Evaluation Time:     PT Eval, Low Complexity Minutes (60594): 20       Signing Clinician: [facility]                                                                                   OUTPATIENT PHYSICAL THERAPY      PLAN OF TREATMENT FOR OUTPATIENT REHABILITATION   Patient's Last Name, First Name, Mynor Arias YOB: 1950   Provider's Name   [facility]   Medical Record No.  [id number]     Onset Date: 09/26/23 (MD order)  Start of Care Date: 09/29/23     Medical Diagnosis:  Rupture of the left Anterior Tibialis tendon      PT Treatment Diagnosis:  Rupture of the left Anterior Tibialis tendon Plan of Treatment  Frequency/Duration: 1x/week/ 8 weeks    Certification date from 09/29/23 to 11/23/23         See note for plan of treatment details and functional goals     Diego Hobson PT                         I CERTIFY THE NEED FOR THESE SERVICES FURNISHED UNDER        THIS PLAN OF TREATMENT AND WHILE UNDER MY CARE .             Physician Signature               Date    X_____________________________________________________                    Referring Provider:  Richi Gimenez      Initial Assessment  See Epic Evaluation- Start of Care Date: 09/29/23

## 2023-10-06 ENCOUNTER — THERAPY VISIT (OUTPATIENT)
Dept: PHYSICAL THERAPY | Facility: CLINIC | Age: 73
End: 2023-10-06
Payer: COMMERCIAL

## 2023-10-06 DIAGNOSIS — S86.219A RUPTURE OF TIBIALIS ANTERIOR TENDON: Primary | ICD-10-CM

## 2023-10-06 PROCEDURE — 97035 APP MDLTY 1+ULTRASOUND EA 15: CPT | Mod: GP | Performed by: PHYSICAL THERAPIST

## 2023-10-06 PROCEDURE — 97110 THERAPEUTIC EXERCISES: CPT | Mod: GP | Performed by: PHYSICAL THERAPIST

## 2023-10-06 PROCEDURE — 97010 HOT OR COLD PACKS THERAPY: CPT | Mod: GP | Performed by: PHYSICAL THERAPIST

## 2023-10-16 ENCOUNTER — THERAPY VISIT (OUTPATIENT)
Dept: PHYSICAL THERAPY | Facility: CLINIC | Age: 73
End: 2023-10-16
Payer: COMMERCIAL

## 2023-10-16 DIAGNOSIS — S86.219A RUPTURE OF TIBIALIS ANTERIOR TENDON: Primary | ICD-10-CM

## 2023-10-16 PROCEDURE — 97110 THERAPEUTIC EXERCISES: CPT | Mod: GP | Performed by: PHYSICAL THERAPIST

## 2023-10-16 PROCEDURE — 97010 HOT OR COLD PACKS THERAPY: CPT | Mod: GP | Performed by: PHYSICAL THERAPIST

## 2023-10-16 PROCEDURE — 97035 APP MDLTY 1+ULTRASOUND EA 15: CPT | Mod: GP | Performed by: PHYSICAL THERAPIST

## 2023-11-06 ENCOUNTER — THERAPY VISIT (OUTPATIENT)
Dept: PHYSICAL THERAPY | Facility: CLINIC | Age: 73
End: 2023-11-06
Payer: COMMERCIAL

## 2023-11-06 DIAGNOSIS — S86.219A RUPTURE OF TIBIALIS ANTERIOR TENDON: Primary | ICD-10-CM

## 2023-11-06 PROCEDURE — 97110 THERAPEUTIC EXERCISES: CPT | Mod: GP | Performed by: PHYSICAL THERAPIST

## 2023-11-06 PROCEDURE — 97035 APP MDLTY 1+ULTRASOUND EA 15: CPT | Mod: GP | Performed by: PHYSICAL THERAPIST

## 2023-12-11 ENCOUNTER — THERAPY VISIT (OUTPATIENT)
Dept: PHYSICAL THERAPY | Facility: CLINIC | Age: 73
End: 2023-12-11
Payer: COMMERCIAL

## 2023-12-11 DIAGNOSIS — S86.219A RUPTURE OF TIBIALIS ANTERIOR TENDON: Primary | ICD-10-CM

## 2023-12-11 PROCEDURE — 97110 THERAPEUTIC EXERCISES: CPT | Mod: GP | Performed by: PHYSICAL THERAPIST

## 2023-12-11 PROCEDURE — 97035 APP MDLTY 1+ULTRASOUND EA 15: CPT | Mod: GP | Performed by: PHYSICAL THERAPIST

## 2023-12-11 NOTE — PROGRESS NOTES
DISCHARGE  Reason for Discharge: No further expectation of progress.  Patient chooses to discontinue therapy.    Equipment Issued: none    Discharge Plan: Patient to continue home program.    Referring Provider:  Richi Gimenez        12/11/23 0500   Appointment Info   Signing clinician's name / credentials Diego Hobson PT   Total/Authorized Visits 12 (MD)   Visits Used 4   Medical Diagnosis Rupture of the left Anterior Tibialis tendon   PT Tx Diagnosis Rupture of the left Anterior Tibialis tendon   Quick Adds Certification   Progress Note/Certification   Start of Care Date 09/29/23   Onset of illness/injury or Date of Surgery 09/26/23  (MD order)   Therapy Frequency 1x/week   Predicted Duration 8 weeks   Certification date from 09/29/23   Certification date to 11/23/23   Progress Note Completed Date 12/11/23   PT Goal 1   Goal Identifier Walking   Goal Description Ability to walk 15 minutes without pain or tripping   Rationale to maximize safety and independence with performance of ADLs and functional tasks;to maximize safety and independence within the community   Goal Progress Improved.  Can walk 15 min with PL up to 2-3/10 and no tripping.   Target Date 11/24/23   Subjective Report   Subjective Report 50-60% stronger with DF and noting improved flexibility with HS and calves.  Walking with less foot drop and less pain.  Can hike on uneven terrain and play pickleball, but will have pain.   Objective Measures   Objective Measures Objective Measure 1   Objective Measure 1   Objective Measure strength   Details MMT left DF 5-/5.  Active DF=5 (vs 8 on right).   PT Modalities   PT Modalities Cryotherapy;Ultrasound   Cryotherapy   Ice -Type HEP   Duration 10 min   Location Left ankle   Ultrasound   Ultrasound Minutes (76252) 8   Ultrasound -Type (does not include 3-5 min prep/cleanup time) Pulsed 50%   Intensity 0.5   Duration (does not include the 3-5 min set up/clean up time) 5 min   Frequency 3 MHz  "  Location Left ant tib insertion, distal tendon   Positioning supine   Treatment Interventions (PT)   Interventions Therapeutic Procedure/Exercise   Therapeutic Procedure/Exercise   Therapeutic Procedures: strength, endurance, ROM, flexibillity minutes (16895) 30   Therapeutic Procedures Ther Proc 2;Ther Proc 3;Ther Proc 4;Ther Proc 5;Ther Proc 6;Ther Proc 7;Ther Proc 8;Ther Proc 9;Ther Proc 10   Ther Proc 1 Active ankle PF/DF   Ther Proc 1 - Details 2x10   Ther Proc 2 Ankle DF, Inv, Ever with TB   Ther Proc 2 - Details 2 x 15 red   Ther Proc 3 Gastroc stretch and HS stretch with belt   Ther Proc 3 - Details 3x10\" each   Ther Proc 4 Standing gastroc and soleus stretch on incline board   Ther Proc 4 - Details 3x10\" each   Ther Proc 5 Soleus stretch on chair   Ther Proc 5 - Details 3x10\"   Ther Proc 6 Reformer toe raises   Ther Proc 6 - Details 3x10 3 spr (10\" stretch after each set)   Ther Proc 7 BAPS board   Ther Proc 7 - Details f/b: 3x10 L3   Ther Proc 8 Seated toe raises and reverse toe raises   Ther Proc 8 - Details 3x10 each   Ther Proc 9 SL balance on trampoline,, BOSU each side   Ther Proc 9 - Details 30\" each   Ther Proc 10 supine hamstring stretch door 3x 10 seconds   Ther Proc 10 - Details dorsiflexion 2 sets 15 x red   Skilled Intervention Verbal, visual cueing   Plan   Plan for next session DC to HEP   Total Session Time   Timed Code Treatment Minutes 38   Total Treatment Time (sum of timed and untimed services) 38       "

## 2024-01-19 DIAGNOSIS — E78.5 HYPERLIPIDEMIA LDL GOAL <130: ICD-10-CM

## 2024-01-19 RX ORDER — ROSUVASTATIN CALCIUM 40 MG/1
40 TABLET, COATED ORAL DAILY
Qty: 90 TABLET | Refills: 0 | Status: SHIPPED | OUTPATIENT
Start: 2024-01-19

## 2024-04-09 DIAGNOSIS — E78.5 HYPERLIPIDEMIA LDL GOAL <70: ICD-10-CM

## 2024-04-09 DIAGNOSIS — I25.810 CORONARY ARTERY DISEASE INVOLVING CORONARY BYPASS GRAFT OF NATIVE HEART WITHOUT ANGINA PECTORIS: Primary | ICD-10-CM

## 2024-04-09 DIAGNOSIS — I10 ESSENTIAL HYPERTENSION WITH GOAL BLOOD PRESSURE LESS THAN 140/90: ICD-10-CM

## 2024-04-16 ENCOUNTER — LAB (OUTPATIENT)
Dept: LAB | Facility: CLINIC | Age: 74
End: 2024-04-16
Payer: COMMERCIAL

## 2024-04-16 DIAGNOSIS — I10 ESSENTIAL HYPERTENSION WITH GOAL BLOOD PRESSURE LESS THAN 140/90: ICD-10-CM

## 2024-04-16 DIAGNOSIS — E78.5 HYPERLIPIDEMIA LDL GOAL <70: ICD-10-CM

## 2024-04-16 DIAGNOSIS — I25.810 CORONARY ARTERY DISEASE INVOLVING CORONARY BYPASS GRAFT OF NATIVE HEART WITHOUT ANGINA PECTORIS: ICD-10-CM

## 2024-04-16 LAB
ALT SERPL W P-5'-P-CCNC: 33 U/L (ref 0–70)
ANION GAP SERPL CALCULATED.3IONS-SCNC: 13 MMOL/L (ref 7–15)
BUN SERPL-MCNC: 24.4 MG/DL (ref 8–23)
CALCIUM SERPL-MCNC: 9.5 MG/DL (ref 8.8–10.2)
CHLORIDE SERPL-SCNC: 103 MMOL/L (ref 98–107)
CHOLEST SERPL-MCNC: 122 MG/DL
CREAT SERPL-MCNC: 0.87 MG/DL (ref 0.67–1.17)
DEPRECATED HCO3 PLAS-SCNC: 21 MMOL/L (ref 22–29)
EGFRCR SERPLBLD CKD-EPI 2021: 90 ML/MIN/1.73M2
FASTING STATUS PATIENT QL REPORTED: YES
GLUCOSE SERPL-MCNC: 94 MG/DL (ref 70–99)
HDLC SERPL-MCNC: 49 MG/DL
LDLC SERPL CALC-MCNC: 53 MG/DL
NONHDLC SERPL-MCNC: 73 MG/DL
POTASSIUM SERPL-SCNC: 4.3 MMOL/L (ref 3.4–5.3)
SODIUM SERPL-SCNC: 137 MMOL/L (ref 135–145)
TRIGL SERPL-MCNC: 101 MG/DL

## 2024-04-16 PROCEDURE — 84460 ALANINE AMINO (ALT) (SGPT): CPT | Performed by: INTERNAL MEDICINE

## 2024-04-16 PROCEDURE — 80061 LIPID PANEL: CPT | Performed by: INTERNAL MEDICINE

## 2024-04-16 PROCEDURE — 80048 BASIC METABOLIC PNL TOTAL CA: CPT | Performed by: INTERNAL MEDICINE

## 2024-04-16 PROCEDURE — 36415 COLL VENOUS BLD VENIPUNCTURE: CPT | Performed by: INTERNAL MEDICINE

## 2024-04-17 ENCOUNTER — OFFICE VISIT (OUTPATIENT)
Dept: CARDIOLOGY | Facility: CLINIC | Age: 74
End: 2024-04-17
Payer: COMMERCIAL

## 2024-04-17 VITALS
BODY MASS INDEX: 27.61 KG/M2 | HEIGHT: 68 IN | HEART RATE: 65 BPM | SYSTOLIC BLOOD PRESSURE: 124 MMHG | DIASTOLIC BLOOD PRESSURE: 70 MMHG | WEIGHT: 182.2 LBS | OXYGEN SATURATION: 97 %

## 2024-04-17 DIAGNOSIS — I10 ESSENTIAL HYPERTENSION WITH GOAL BLOOD PRESSURE LESS THAN 140/90: ICD-10-CM

## 2024-04-17 DIAGNOSIS — I35.0 NONRHEUMATIC AORTIC VALVE STENOSIS: Primary | ICD-10-CM

## 2024-04-17 DIAGNOSIS — E78.5 HYPERLIPIDEMIA LDL GOAL <70: ICD-10-CM

## 2024-04-17 DIAGNOSIS — I25.810 CORONARY ARTERY DISEASE INVOLVING CORONARY BYPASS GRAFT OF NATIVE HEART WITHOUT ANGINA PECTORIS: ICD-10-CM

## 2024-04-17 PROCEDURE — 99214 OFFICE O/P EST MOD 30 MIN: CPT | Performed by: INTERNAL MEDICINE

## 2024-04-17 NOTE — PROGRESS NOTES
HPI and Plan:   It is my pleasure to see your patient Vinnie Curry in follow-up.  This is a 73-year-old patient with mild nonobstructive coronary artery disease on CT coronary angiography.  This patient also has vasodepressor syncope and has a history of mild aortic stenosis and essential hypertension.    Since I last saw him he did have 1 episode of syncope.  He was in a hot tub in the [place] area and he felt that he stayed a bit longer in the hot tub then he should have.  He began to feel unwell and made his way up to his hotel room.  He sat at the edge of his bed and then had a syncopal episode but his wife was able to lower him to the ground slowly and he recovered consciousness very quickly.  So this was a classic vasovagal event and being in the hot tub for long time which causes vasodilatation is a classic trigger.  He also had an episode recently where he had discomfort in his right foot when he was making breakfast for his wife.  He recently ruptured the tibialis anterior tendon which was quite painful and again he had another episode with a prodrome of syncope.    His last echocardiogram was in February 2023 and he was found to have mild aortic stenosis at that stage with a calculated aortic valve area between 1.5 and 1.4 cm  and a mean gradient of 19 mmHg.  For some reason he did not have an echocardiogram prior to my visit today.    His blood pressure is well-controlled at 124/70.  He is lipids are excellent with an LDL of 53 HDL of 49 and triglycerides of 101 with a total cholesterol of 122.  Because he is taking losartan hydrochlorothiazide we do check his basic metabolic profile every year and his sodium was normal at 137 as well as his potassium at 4.3.  The BUN was 24.4 and the creatinine was 0.87 with a GFR of 90 indicating normal renal function.    Impression:  1.  Vasodepressor syncope.  Patient had 2 episodes since I last saw him both with prodromes and both with very identifiable triggers,  1 with pain and 1 with significant vasodilatation from a hot tub.  2.  Mild aortic stenosis.  3.  Mild nonobstructive coronary artery disease which is asymptomatic.  4.  Excellent lipid profile well within secondary prevention guidelines with no evidence of hepatic toxicity.  5.  Essential hypertension.  His blood pressure is well-controlled.    Plan:  1.  I will continue the patient on his present medications.  2.  We will repeat his echocardiogram in the next week since this has not been done for a year and 2 months to follow his aortic stenosis.  He will require yearly echocardiograms.  3.  I asked him to lay down as quickly as possible if he gets a prodrome for a vasodepressor syncopal event.  That will typically abort the syncopal event rapidly.  4.  Will recheck his basic metabolic profile and lipid profile with ALT also in 1 years time.    As always the patient is been told to contact me if he is any questions or any concerns.    Alfonso Ng MD, FACC, FRCPI    Orders Placed This Encounter   Procedures    Basic metabolic panel    Lipid Profile    ALT    Follow-Up with Cardiology    Echocardiogram Complete    Echocardiogram Complete       No orders of the defined types were placed in this encounter.      There are no discontinued medications.      Encounter Diagnoses   Name Primary?    Nonrheumatic aortic valve stenosis Yes    Coronary artery disease involving coronary bypass graft of native heart without angina pectoris     Essential hypertension with goal blood pressure less than 140/90     Hyperlipidemia LDL goal <70        CURRENT MEDICATIONS:  Current Outpatient Medications   Medication Sig Dispense Refill    aspirin EC 81 MG tablet Take 1 tablet (81 mg) by mouth daily      Cholecalciferol (VITAMIN D) 2000 UNITS CAPS Take by mouth daily      gabapentin (NEURONTIN) 300 MG capsule Take 300 mg by mouth 2 times daily       Ibuprofen (ADVIL PO) Take 400 mg by mouth every 4 hours as needed        losartan-hydrochlorothiazide (HYZAAR) 100-12.5 MG tablet Take 1 tablet by mouth daily 90 tablet 3    Multiple Vitamins-Minerals (DAILY MULTIVITAMIN PO) Take by mouth daily      Multiple Vitamins-Minerals (LUTEIN-ZEAXANTHIN PO)       omeprazole (PRILOSEC) 20 MG DR capsule TAKE 1 CAPSULE (20 MG) BY MOUTH DAILY 30 capsule 1    rosuvastatin (CRESTOR) 40 MG tablet Take 1 tablet (40 mg) by mouth daily 90 tablet 0    sildenafil (VIAGRA) 100 MG tablet Take 0.5-1 tablets ( mg) by mouth daily as needed 6 tablet 11    valACYclovir (VALTREX) 500 MG tablet TAKE ONE TABLET BY MOUTH TWICE DAILY FOR THREE DAYS FOR EACH OUTBREAK (Patient not taking: Reported on 4/17/2024) 30 tablet 1       ALLERGIES     Allergies   Allergen Reactions    Amoxicillin-Pot Clavulanate      vomiting       PAST MEDICAL HISTORY:  Past Medical History:   Diagnosis Date    Arthritis     Coronary artery disease     Mild    Erectile dysfunction due to arterial insufficiency 8/31/2015    Hyperlipidemia LDL goal <70 2/4/2017    Hypertension goal BP (blood pressure) < 140/90 7/17/2013    Since 2006     Syncope     vasovagal       PAST SURGICAL HISTORY:  Past Surgical History:   Procedure Laterality Date    ARTHROPLASTY HIP ANTERIOR  4/30/2014    Procedure: ARTHROPLASTY HIP ANTERIOR;  Surgeon: Ayaz Butcher MD;  Location: [facility] OR    ARTHROSCOPY KNEE WITH MEDIAL MENISCECTOMY  2001    left    ARTHROSCOPY SHOULDER  1999    right    COLONOSCOPY      HEMILAMINECTOMY, DISCECTOMY LUMBAR ONE LEVEL, COMBINED  1989    HERNIA REPAIR, INGUINAL RT/LT  2011    bilateral    ORTHOPEDIC SURGERY         FAMILY HISTORY:  Family History   Problem Relation Age of Onset    Arthritis Mother     Hypertension Mother     Arthritis Father     Hypertension Father     Arrhythmia Father         atrial fib    C.A.D. No family hx of     Diabetes No family hx of     Prostate Cancer No family hx of     Cancer - colorectal No family hx of         mother with some polyps though        SOCIAL HISTORY:  Social History     Socioeconomic History    Marital status:      Spouse name: Melanie    Number of children: 3    Years of education: None    Highest education level: None   Occupational History    Occupation:      Employer: RETIRED   Tobacco Use    Smoking status: Never    Smokeless tobacco: Never   Substance and Sexual Activity    Alcohol use: Yes     Alcohol/week: 0.0 standard drinks of alcohol     Comment: 2-3 drinks per week    Drug use: No    Sexual activity: Yes     Partners: Female     Birth control/protection: Condom   Other Topics Concern    Parent/sibling w/ CABG, MI or angioplasty before 65F 55M? No     Service No    Blood Transfusions No    Caffeine Concern No     Comment: Hot tea daily - 2 cups    Occupational Exposure No    Hobby Hazards No    Sleep Concern No    Stress Concern No    Weight Concern No    Special Diet No    Back Care No    Exercise Yes     Comment: biking- 2-3 days per week  on average    Bike Helmet Yes    Seat Belt Yes    Self-Exams Yes     Social Determinants of Health     Financial Resource Strain: Low Risk  (3/20/2024)    Received from [facility]    Financial Resource Strain     Difficulty of Paying Living Expenses: 3   Food Insecurity: No Food Insecurity (3/20/2024)    Received from [facility]    Food Insecurity     Worried About Running Out of Food in the Last Year: 1   Transportation Needs: No Transportation Needs (3/20/2024)    Received from [facility]    Transportation Needs     Lack of Transportation (Medical): 1   Social Connections: Socially Integrated (3/20/2024)    Received from [facility]    Social Connections     Frequency of Communication with Friends and Family: 0   Housing Stability: Low Risk  (3/20/2024)    Received from [facility]    Housing  "Stability     Unable to Pay for Housing in the Last Year: 1       Review of Systems:  Skin:  not assessed       Eyes:  not assessed      ENT:  not assessed      Respiratory:  Negative       Cardiovascular:    syncope or near-syncope;Positive for Syncope episode last month, felt it after being in a hot tube.  Gastroenterology: not assessed      Genitourinary:  not assessed      Musculoskeletal:  not assessed      Neurologic:  not assessed      Psychiatric:  not assessed      Heme/Lymph/Imm:  not assessed      Endocrine:  not assessed        Physical Exam:  Vitals: /70 (BP Location: Right arm, Patient Position: Sitting, Cuff Size: Adult Regular)   Pulse 65   Ht 1.727 m (5' 8\")   Wt 82.6 kg (182 lb 3.2 oz)   SpO2 97%   BMI 27.70 kg/m      Constitutional:  cooperative, alert and oriented, well developed, well nourished, in no acute distress thin      Skin:  warm and dry to the touch, no apparent skin lesions or masses noted          Head:  normocephalic, no masses or lesions        Eyes:  pupils equal and round        Lymph:No Cervical lymphadenopathy present     ENT:  no pallor or cyanosis, dentition good;no pallor or cyanosis        Neck:  carotid pulses are full and equal bilaterally, JVP normal, no carotid bruit        Respiratory:  normal breath sounds, clear to auscultation, normal A-P diameter, normal symmetry, normal respiratory excursion, no use of accessory muscles         Cardiac: regular rhythm, normal S1/S2, no S3 or S4, apical impulse not displaced, no murmurs, gallops or rubs                pulses full and equal, no bruits auscultated                                        GI:  not assessed this visit        Extremities and Muscular Skeletal:  no deformities, clubbing, cyanosis, erythema observed;no edema              Neurological:  no gross motor deficits;affect appropriate        Psych:  Alert and Oriented x 3        CC  No referring provider defined for this encounter.                "

## 2024-05-02 ENCOUNTER — HOSPITAL ENCOUNTER (OUTPATIENT)
Dept: CARDIOLOGY | Facility: CLINIC | Age: 74
Discharge: HOME | End: 2024-05-02
Attending: INTERNAL MEDICINE | Admitting: INTERNAL MEDICINE
Payer: COMMERCIAL

## 2024-05-02 ENCOUNTER — TELEPHONE (OUTPATIENT)
Dept: CARDIOLOGY | Facility: CLINIC | Age: 74
End: 2024-05-02

## 2024-05-02 DIAGNOSIS — I35.0 NONRHEUMATIC AORTIC VALVE STENOSIS: ICD-10-CM

## 2024-05-02 LAB — LVEF ECHO: NORMAL

## 2024-05-02 PROCEDURE — 93306 TTE W/DOPPLER COMPLETE: CPT | Mod: 26 | Performed by: INTERNAL MEDICINE

## 2024-05-02 PROCEDURE — 93306 TTE W/DOPPLER COMPLETE: CPT

## 2024-05-02 NOTE — TELEPHONE ENCOUNTER
Reviewed echo showing   Aortic valve possibly bicuspid, heavily calcified and appears severely stenotic visually.  Hemodynamics suggest mild-moderate aortic valve stenosis with trace regurgitation.  Peak transaortic velocity 3.0 m/s, mean systolic gradient 21 mmHg, valve area 1.2-1.3 cmÂ , dimensionless index 0.34.  Normal left ventricular size and systolic function. Estimated LVEF 60-65%.  Normal right ventricular size and systolic function.     Compared to the previous study dated 2/21/2023, aortic valve stenosis has progressed from mild to moderate.    Per office note dated 4/17/24, Dr. Ng recommended:   1.  I will continue the patient on his present medications.  2.  We will repeat his echocardiogram in the next week since this has not been done for a year and 2 months to follow his aortic stenosis.  He will require yearly echocardiograms.  3.  I asked him to lay down as quickly as possible if he gets a prodrome for a vasodepressor syncopal event.  That will typically abort the syncopal event rapidly.  4.  Will recheck his basic metabolic profile and lipid profile with ALT also in 1 years time.    Will message Dr. Ng to review. Mimi CASTRO

## 2024-05-02 NOTE — TELEPHONE ENCOUNTER
Would let patient know that the aortic stenosis has progressed.  Will repeat his echocardiogram in 1 years time.  Will see him at that time.

## 2024-05-02 NOTE — TELEPHONE ENCOUNTER
Called patient with echo results and recommendations from Dr. Ayan Lu.  Order for echo in 1 year already in chart.  Patient advised to call about 6 months in advance to schedule. Mimi CASTRO

## 2024-11-06 ENCOUNTER — TELEPHONE (OUTPATIENT)
Dept: CARDIOLOGY | Facility: CLINIC | Age: 74
End: 2024-11-06
Payer: COMMERCIAL

## 2024-11-06 NOTE — TELEPHONE ENCOUNTER
Health Call Center    Phone Message    May a detailed message be left on voicemail: yes     Reason for Call: Order(s): Other:   Reason for requested: Pt's TKR was canceled because pt needs a Stress Echo before they will consider rescheduling the procedure.  Pt would very much appreciate a phone call from Dr. Ayan Lu to discuss the need for the Stress Echo.  Date needed: ASAP  Provider name: Dr. Aayn Lu    Action Taken: Message routed to:  [facility] cardio    Travel Screening: Not Applicable    Thank you!  [facility]      Date of Service:

## 2024-11-06 NOTE — TELEPHONE ENCOUNTER
Called patient, states the anesthesiologist canceled his knee surgery and states that he needs a stress echo.  Patient was advised he should call cardiology.  Patient states they are not requesting cardiac clearance.  Patient advised that the physician requesting the stress echo can order the stress echo and advised he call them back.  Patient states he will do so. Mimi CASTRO

## 2024-11-18 ENCOUNTER — OFFICE VISIT (OUTPATIENT)
Dept: CARDIOLOGY | Facility: CLINIC | Age: 74
End: 2024-11-18
Payer: COMMERCIAL

## 2024-11-18 VITALS
HEART RATE: 69 BPM | WEIGHT: 182 LBS | DIASTOLIC BLOOD PRESSURE: 78 MMHG | HEIGHT: 68 IN | BODY MASS INDEX: 27.58 KG/M2 | SYSTOLIC BLOOD PRESSURE: 169 MMHG | OXYGEN SATURATION: 97 %

## 2024-11-18 DIAGNOSIS — I35.0 NONRHEUMATIC AORTIC VALVE STENOSIS: Primary | ICD-10-CM

## 2024-11-18 PROCEDURE — 99213 OFFICE O/P EST LOW 20 MIN: CPT | Performed by: INTERNAL MEDICINE

## 2024-11-18 NOTE — PROGRESS NOTES
HPI and Plan:   Is a pleasure to see this very pleasant 74-year-old retired affected area and for cardiac clearance.  He is scheduled to have a knee replacement done at [facility].    The reason why surgical clearance was requested is because the anesthesia provider reviewed his charts and was not happy with his aortic stenosis.  Echocardiogram done in May the aortic stenosis has progressed but it is still only moderate.  This is confirmed by stress echocardiogram which he had recently.      He reached 79% of his age-predicted heart rate and he exercised over 7 minutes on a Charan protocol.  Exercise tolerance is above average for his age and there was no evidence of inducible ischemia.  He did have ST segment depression noted but with unremarkable echocardiographic images the stress EKG response is likely false positive    Correspondingly in his day-to-day activity he has no symptoms of angina or heart failure.  He does have a history of vasovagal syncope but not of exertional syncope.  He actively participates in pickleball and he played for an hour last night.  There is history of nonobstructive coronary artery disease as demonstrated by CT coronary angiography in 2015.    On cardiac examination he does have a 2 out of 6 ejection systolic murmur and second heart sound is mildly diminished.  Otherwise cardiac examination is unremarkable.    His baseline ECG is unremarkable.    In summary this is a very nice 74-year-old gentleman with moderate aortic stenosis which is not hemodynamically significant, symptoms of angina or heart failure and no evidence of inducible ischemia on stress echocardiography.  As such his cardiac condition is optimized from our point of view for left knee replacement surgery and no further cardiac testing is necessary.    Wished him well.  He has regular follow-up with my friend and colleague Dr. Alfonso Lu.    No orders of the defined types were placed in this  encounter.      No orders of the defined types were placed in this encounter.      No diagnosis found.    CURRENT MEDICATIONS:  Current Outpatient Medications   Medication Sig Dispense Refill    aspirin EC 81 MG tablet Take 1 tablet (81 mg) by mouth daily      Cholecalciferol (VITAMIN D) 2000 UNITS CAPS Take by mouth daily      gabapentin (NEURONTIN) 300 MG capsule Take 300 mg by mouth 2 times daily       losartan-hydrochlorothiazide (HYZAAR) 100-12.5 MG tablet Take 1 tablet by mouth daily 90 tablet 3    omeprazole (PRILOSEC) 20 MG DR capsule TAKE 1 CAPSULE (20 MG) BY MOUTH DAILY 30 capsule 1    rosuvastatin (CRESTOR) 40 MG tablet Take 1 tablet (40 mg) by mouth daily 90 tablet 0    sildenafil (VIAGRA) 100 MG tablet Take 0.5-1 tablets ( mg) by mouth daily as needed 6 tablet 11    valACYclovir (VALTREX) 500 MG tablet TAKE ONE TABLET BY MOUTH TWICE DAILY FOR THREE DAYS FOR EACH OUTBREAK 30 tablet 1    Ibuprofen (ADVIL PO) Take 400 mg by mouth every 4 hours as needed       Multiple Vitamins-Minerals (DAILY MULTIVITAMIN PO) Take by mouth daily      Multiple Vitamins-Minerals (LUTEIN-ZEAXANTHIN PO)          ALLERGIES     Allergies   Allergen Reactions    Amoxicillin-Pot Clavulanate      vomiting       PAST MEDICAL HISTORY:  Past Medical History:   Diagnosis Date    Arthritis     Coronary artery disease     Mild    Erectile dysfunction due to arterial insufficiency 8/31/2015    Hyperlipidemia LDL goal <70 2/4/2017    Hypertension goal BP (blood pressure) < 140/90 7/17/2013    Since 2006     Syncope     vasovagal       PAST SURGICAL HISTORY:  Past Surgical History:   Procedure Laterality Date    ARTHROPLASTY HIP ANTERIOR  4/30/2014    Procedure: ARTHROPLASTY HIP ANTERIOR;  Surgeon: Ayaz Butcher MD;  Location: [facility] OR    ARTHROSCOPY KNEE WITH MEDIAL MENISCECTOMY  2001    left    ARTHROSCOPY SHOULDER  1999    right    COLONOSCOPY      HEMILAMINECTOMY, DISCECTOMY LUMBAR ONE LEVEL, COMBINED  1989    HERNIA  REPAIR, INGUINAL RT/LT  2011    bilateral    ORTHOPEDIC SURGERY         FAMILY HISTORY:  Family History   Problem Relation Age of Onset    Arthritis Mother     Hypertension Mother     Arthritis Father     Hypertension Father     Arrhythmia Father         atrial fib    C.A.D. No family hx of     Diabetes No family hx of     Prostate Cancer No family hx of     Cancer - colorectal No family hx of         mother with some polyps though       SOCIAL HISTORY:  Social History     Socioeconomic History    Marital status:      Spouse name: Melanie    Number of children: 3    Years of education: None    Highest education level: None   Occupational History    Occupation:      Employer: RETIRED   Tobacco Use    Smoking status: Never    Smokeless tobacco: Never   Substance and Sexual Activity    Alcohol use: Yes     Alcohol/week: 0.0 standard drinks of alcohol     Comment: 2-3 drinks per week    Drug use: No    Sexual activity: Yes     Partners: Female     Birth control/protection: Condom   Other Topics Concern    Parent/sibling w/ CABG, MI or angioplasty before 65F 55M? No     Service No    Blood Transfusions No    Caffeine Concern No     Comment: Hot tea daily - 2 cups    Occupational Exposure No    Hobby Hazards No    Sleep Concern No    Stress Concern No    Weight Concern No    Special Diet No    Back Care No    Exercise Yes     Comment: biking- 2-3 days per week  on average    Bike Helmet Yes    Seat Belt Yes    Self-Exams Yes     Social Drivers of Health     Financial Resource Strain: Low Risk  (3/20/2024)    Received from [employer]    Financial Resource Strain     Difficulty of Paying Living Expenses: 3   Food Insecurity: No Food Insecurity (3/20/2024)    [facility]    Food Insecurity     Do you worry your food will run out before you are able to buy more?: 1   Transportation Needs: No Transportation Needs  "(3/20/2024)    Received from [facility]    Transportation Needs     Does lack of transportation keep you from medical appointments?: 1     Does lack of transportation keep you from work, meetings or getting things that you need?: 1   Social Connections: Socially Integrated (3/20/2024)    [facility]    Social Connections     Do you often feel lonely or isolated from those around you?: 0   Housing Stability: Low Risk  (3/20/2024)    [facility]    Housing Stability     What is your housing situation today?: 1       Review of Systems:  Skin:  not assessed     Eyes:  not assessed    ENT:  not assessed    Respiratory:  Negative    Cardiovascular:    syncope or near-syncope;Positive for  Gastroenterology: not assessed    Genitourinary:  not assessed    Musculoskeletal:  not assessed    Neurologic:  not assessed    Psychiatric:  not assessed    Heme/Lymph/Imm:  not assessed    Endocrine:  not assessed      Physical Exam:  Vitals: BP (!) 169/78 (BP Location: Left arm, Patient Position: Sitting)   Pulse 69   Ht 1.727 m (5' 8\")   Wt 82.6 kg (182 lb)   SpO2 97%   BMI 27.67 kg/m      Constitutional:  cooperative, alert and oriented, well developed, well nourished, in no acute distress thin      Skin:  warm and dry to the touch, no apparent skin lesions or masses noted          Head:  normocephalic, no masses or lesions        Eyes:  pupils equal and round        Lymph:No Cervical lymphadenopathy present     ENT:  no pallor or cyanosis, dentition good;no pallor or cyanosis        Neck:  carotid pulses are full and equal bilaterally, JVP normal, no carotid bruit        Respiratory:  normal breath sounds, clear to auscultation, normal A-P diameter, normal symmetry, normal respiratory excursion, no use of accessory muscles         Cardiac: regular rhythm, normal S1/S2, no S3 or S4, apical impulse not " displaced, no murmurs, gallops or rubs                pulses full and equal, no bruits auscultated                                        GI:  not assessed this visit        Extremities and Muscular Skeletal:  no deformities, clubbing, cyanosis, erythema observed;no edema              Neurological:  no gross motor deficits;affect appropriate        Psych:  Alert and Oriented x 3        Recent Lab Results:  LIPID RESULTS:  Lab Results   Component Value Date    CHOL 122 04/16/2024    CHOL 139 09/24/2020    HDL 49 04/16/2024    HDL 60 09/24/2020    LDL 53 04/16/2024    LDL 62 09/24/2020    TRIG 101 04/16/2024    TRIG 87 09/24/2020    CHOLHDLRATIO 2.0 11/04/2015       LIVER ENZYME RESULTS:  Lab Results   Component Value Date    AST 29 03/02/2022    AST 20 02/11/2020    ALT 33 04/16/2024    ALT 36 09/24/2020       CBC RESULTS:  Lab Results   Component Value Date    WBC 8.1 03/02/2022    WBC 5.9 02/20/2020    RBC 3.40 (L) 03/02/2022    RBC 4.45 02/20/2020    HGB 10.3 (L) 03/02/2022    HGB 14.4 02/20/2020    HCT 31.9 (L) 03/02/2022    HCT 42.9 02/20/2020    MCV 94 03/02/2022    MCV 96 02/20/2020    MCH 30.3 03/02/2022    MCH 32.4 02/20/2020    MCHC 32.3 03/02/2022    MCHC 33.6 02/20/2020    RDW 13.2 03/02/2022    RDW 12.0 02/20/2020     (H) 03/02/2022     02/20/2020       BMP RESULTS:  Lab Results   Component Value Date     04/16/2024     09/24/2020    POTASSIUM 4.3 04/16/2024    POTASSIUM 4.9 03/02/2022    POTASSIUM 3.7 09/24/2020    CHLORIDE 103 04/16/2024    CHLORIDE 105 03/02/2022    CHLORIDE 105 09/24/2020    CO2 21 (L) 04/16/2024    CO2 24 03/02/2022    CO2 26 09/24/2020    ANIONGAP 13 04/16/2024    ANIONGAP 8 03/02/2022    ANIONGAP 5 09/24/2020    GLC 94 04/16/2024     (H) 03/02/2022    GLC 90 09/24/2020    BUN 24.4 (H) 04/16/2024    BUN 21 03/02/2022    BUN 24 09/24/2020    CR 0.87 04/16/2024    CR 0.97 09/24/2020    GFRESTIMATED 90 04/16/2024    GFRESTIMATED 78 09/24/2020     "GFRESTZEINABACK 90 09/24/2020    DEENA 9.5 04/16/2024    DEENA 8.8 09/24/2020        A1C RESULTS:  No results found for: \"A1C\"    INR RESULTS:  No results found for: \"INR\"        CC  No referring provider defined for this encounter.                 "

## 2025-01-02 ASSESSMENT — ACTIVITIES OF DAILY LIVING (ADL)
WALK: ACTIVITY IS SOMEWHAT DIFFICULT
KNEEL ON THE FRONT OF YOUR KNEE: I AM UNABLE TO DO THE ACTIVITY
AS_A_RESULT_OF_YOUR_KNEE_INJURY,_HOW_WOULD_YOU_RATE_YOUR_CURRENT_LEVEL_OF_DAILY_ACTIVITY?: ABNORMAL
SIT WITH YOUR KNEE BENT: ACTIVITY IS FAIRLY DIFFICULT
STIFFNESS: THE SYMPTOM AFFECTS MY ACTIVITY MODERATELY
LIMPING: THE SYMPTOM AFFECTS MY ACTIVITY SLIGHTLY
RISE FROM A CHAIR: ACTIVITY IS SOMEWHAT DIFFICULT
GIVING WAY, BUCKLING OR SHIFTING OF KNEE: I DO NOT HAVE THE SYMPTOM
KNEE_ACTIVITY_OF_DAILY_LIVING_SCORE: 50
AS_A_RESULT_OF_YOUR_KNEE_INJURY,_HOW_WOULD_YOU_RATE_YOUR_CURRENT_LEVEL_OF_DAILY_ACTIVITY?: ABNORMAL
WEAKNESS: THE SYMPTOM AFFECTS MY ACTIVITY SLIGHTLY
GO UP STAIRS: ACTIVITY IS MINIMALLY DIFFICULT
STIFFNESS: THE SYMPTOM AFFECTS MY ACTIVITY MODERATELY
HOW_WOULD_YOU_RATE_THE_CURRENT_FUNCTION_OF_YOUR_KNEE_DURING_YOUR_USUAL_DAILY_ACTIVITIES_ON_A_SCALE_FROM_0_TO_100_WITH_100_BEING_YOUR_LEVEL_OF_KNEE_FUNCTION_PRIOR_TO_YOUR_INJURY_AND_0_BEING_THE_INABILITY_TO_PERFORM_ANY_OF_YOUR_USUAL_DAILY_ACTIVITIES?: 60
RISE FROM A CHAIR: ACTIVITY IS SOMEWHAT DIFFICULT
GO UP STAIRS: ACTIVITY IS MINIMALLY DIFFICULT
KNEEL ON THE FRONT OF YOUR KNEE: I AM UNABLE TO DO THE ACTIVITY
RAW_SCORE: 35
GO DOWN STAIRS: ACTIVITY IS FAIRLY DIFFICULT
SIT WITH YOUR KNEE BENT: ACTIVITY IS FAIRLY DIFFICULT
HOW_WOULD_YOU_RATE_THE_CURRENT_FUNCTION_OF_YOUR_KNEE_DURING_YOUR_USUAL_DAILY_ACTIVITIES_ON_A_SCALE_FROM_0_TO_100_WITH_100_BEING_YOUR_LEVEL_OF_KNEE_FUNCTION_PRIOR_TO_YOUR_INJURY_AND_0_BEING_THE_INABILITY_TO_PERFORM_ANY_OF_YOUR_USUAL_DAILY_ACTIVITIES?: 60
LIMPING: THE SYMPTOM AFFECTS MY ACTIVITY SLIGHTLY
HOW_WOULD_YOU_RATE_THE_OVERALL_FUNCTION_OF_YOUR_KNEE_DURING_YOUR_USUAL_DAILY_ACTIVITIES?: ABNORMAL
GIVING WAY, BUCKLING OR SHIFTING OF KNEE: I DO NOT HAVE THE SYMPTOM
WEAKNESS: THE SYMPTOM AFFECTS MY ACTIVITY SLIGHTLY
SQUAT: ACTIVITY IS FAIRLY DIFFICULT
STAND: ACTIVITY IS FAIRLY DIFFICULT
GO DOWN STAIRS: ACTIVITY IS FAIRLY DIFFICULT
SWELLING: THE SYMPTOM AFFECTS MY ACTIVITY MODERATELY
WALK: ACTIVITY IS SOMEWHAT DIFFICULT
SQUAT: ACTIVITY IS FAIRLY DIFFICULT
STAND: ACTIVITY IS FAIRLY DIFFICULT
SWELLING: THE SYMPTOM AFFECTS MY ACTIVITY MODERATELY
PAIN: THE SYMPTOM AFFECTS MY ACTIVITY MODERATELY
PAIN: THE SYMPTOM AFFECTS MY ACTIVITY MODERATELY
KNEE_ACTIVITY_OF_DAILY_LIVING_SUM: 35
HOW_WOULD_YOU_RATE_THE_OVERALL_FUNCTION_OF_YOUR_KNEE_DURING_YOUR_USUAL_DAILY_ACTIVITIES?: ABNORMAL
PLEASE_INDICATE_YOR_PRIMARY_REASON_FOR_REFERRAL_TO_THERAPY:: KNEE

## 2025-01-06 ENCOUNTER — TRANSCRIBE ORDERS (OUTPATIENT)
Dept: OTHER | Age: 75
End: 2025-01-06

## 2025-01-06 ENCOUNTER — THERAPY VISIT (OUTPATIENT)
Dept: PHYSICAL THERAPY | Facility: CLINIC | Age: 75
End: 2025-01-06
Payer: COMMERCIAL

## 2025-01-06 DIAGNOSIS — Z96.651 TOTAL KNEE REPLACEMENT STATUS, RIGHT: Primary | ICD-10-CM

## 2025-01-06 DIAGNOSIS — Z96.659 S/P TOTAL KNEE ARTHROPLASTY: ICD-10-CM

## 2025-01-06 DIAGNOSIS — Z47.1 ORTHOPEDIC AFTERCARE FOR JOINT REPLACEMENT: Primary | ICD-10-CM

## 2025-01-06 PROCEDURE — 97161 PT EVAL LOW COMPLEX 20 MIN: CPT | Mod: GP | Performed by: PHYSICAL THERAPIST

## 2025-01-06 PROCEDURE — 97010 HOT OR COLD PACKS THERAPY: CPT | Mod: GP | Performed by: PHYSICAL THERAPIST

## 2025-01-06 PROCEDURE — 97110 THERAPEUTIC EXERCISES: CPT | Mod: GP | Performed by: PHYSICAL THERAPIST

## 2025-01-06 NOTE — PROGRESS NOTES
PHYSICAL THERAPY EVALUATION  Type of Visit: Evaluation              Subjective     Pt presents to PT s/p right TKA.  DOS= 12/4/24.  He has done in home PT the past 4 weeks.  He describes variable pain level, moderate edema, tightness.  Pt is walking without an assistive device.          Presenting condition or subjective complaint: (Patient-Rptd) Total knee replacement-right knee  Date of onset:      Relevant medical history:     Dates & types of surgery: (Patient-Rptd) Too many to enumerate here    Prior diagnostic imaging/testing results: (Patient-Rptd) X-ray     Prior therapy history for the same diagnosis, illness or injury: (Patient-Rptd) Yes (Patient-Rptd) Seven sessions in home in first four weeks    Prior Level of Function  Transfers: Independent  Ambulation: Independent  ADL: Independent  IADL:     Living Environment  Social support: (Patient-Rptd) With a significant other or spouse   Type of home: (Patient-Rptd) House; 1 level; Basement   Stairs to enter the home: (Patient-Rptd) Yes (Patient-Rptd) 3 Is there a railing: (Patient-Rptd) Yes     Ramp: (Patient-Rptd) No   Stairs inside the home: (Patient-Rptd) Yes (Patient-Rptd) 13 Is there a railing: (Patient-Rptd) Yes     Help at home: (Patient-Rptd) Self Cares (home health aide/personal care attendant, family, etc)  Equipment owned: (Patient-Rptd) Straight Cane; Four-point cane; Walker with wheels; Grab bars     Employment: (Patient-Rptd) No    Hobbies/Interests: (Patient-Rptd) Bike, kayak, snowshoe, gardening    Patient goals for therapy: (Patient-Rptd) Run, jump and play    Pain assessment: See objective evaluation for additional pain details     Objective   KNEE EVALUATION  PAIN: Pain Level at Rest: 3/10  Pain Level with Use: 6/10  Pain is Exacerbated By: activity, at night  Pain is Relieved By: cold and movement  INTEGUMENTARY (edema, incisions):  mild edema levels for TKA  POSTURE: WNL  GAIT:  Weightbearing Status: WBAT  Assistive Device(s): None  Gait  Deviations:  mild lacking of push off, knee hinge  BALANCE/PROPRIOCEPTION:   WEIGHTBEARING ALIGNMENT: WNL  NON-WEIGHTBEARING ALIGNMENT: WNL  ROM:   (Degrees) Left AROM Left PROM  Right AROM Right PROM   Knee Flexion 116  108    Knee Extension 5  12    Pain:   End feel:     STRENGTH:  good quad set with independent SLR  FLEXIBILITY:   SPECIAL TESTS:   FUNCTIONAL TESTS:   PALPATION:   JOINT MOBILITY:     Assessment & Plan   CLINICAL IMPRESSIONS  Medical Diagnosis: Right TKA    Treatment Diagnosis: Right TKA   Impression/Assessment: Patient is a 74 year old male with right knee complaints.  The following significant findings have been identified: Pain, Decreased ROM/flexibility, Decreased strength, and Edema. These impairments interfere with their ability to perform self care tasks, recreational activities, household chores, driving , household mobility, and community mobility as compared to previous level of function.     Clinical Decision Making (Complexity):  Clinical Presentation: Stable/Uncomplicated  Clinical Presentation Rationale: based on medical and personal factors listed in PT evaluation  Clinical Decision Making (Complexity): Low complexity    PLAN OF CARE  Treatment Interventions:  Modalities: Cryotherapy  Interventions: Therapeutic Activity, Therapeutic Exercise    Long Term Goals     PT Goal 1  Goal Identifier: Stairs  Goal Description: ABle to ascend and descend reciprocally  Rationale: to maximize safety and independence with performance of ADLs and functional tasks  Target Date: 02/17/25  PT Goal 2  Goal Identifier: Walking  Goal Description: Able to walk for 15 minutes  Rationale: to maximize safety and independence with performance of ADLs and functional tasks  Target Date: 02/17/25      Frequency of Treatment: 2x/week, then 1x/week  Duration of Treatment: 8 weeks    Recommended Referrals to Other Professionals:   Education Assessment:        Risks and benefits of evaluation/treatment have been  explained.   Patient/Family/caregiver agrees with Plan of Care.     Evaluation Time:     PT Eval, Low Complexity Minutes (48247): 12       Signing Clinician: NURYS [facility]                                                                                   OUTPATIENT PHYSICAL THERAPY      PLAN OF TREATMENT FOR OUTPATIENT REHABILITATION   Patient's Last Name, First Name, JAYNealONIELNeal  AntoinetteMynor MOREIRA YOB: 1950   Provider's Name   [facility]   Medical Record No.  [id number]     Onset Date:    Start of Care Date: 01/06/25     Medical Diagnosis:  Right TKA      PT Treatment Diagnosis:  Right TKA Plan of Treatment  Frequency/Duration: 2x/week, then 1x/week/ 8 weeks    Certification date from 01/06/25 to 03/16/25         See note for plan of treatment details and functional goals     Diego Hobson, NURYS                         I CERTIFY THE NEED FOR THESE SERVICES FURNISHED UNDER        THIS PLAN OF TREATMENT AND WHILE UNDER MY CARE .             Physician Signature               Date    X_____________________________________________________                  Referring Provider:  Girish Carter    Initial Assessment  See Epic Evaluation- Start of Care Date: 01/06/25

## 2025-01-14 ENCOUNTER — THERAPY VISIT (OUTPATIENT)
Dept: PHYSICAL THERAPY | Facility: CLINIC | Age: 75
End: 2025-01-14
Payer: COMMERCIAL

## 2025-01-14 DIAGNOSIS — Z96.651 TOTAL KNEE REPLACEMENT STATUS, RIGHT: Primary | ICD-10-CM

## 2025-01-14 PROCEDURE — 97010 HOT OR COLD PACKS THERAPY: CPT | Mod: GP | Performed by: PHYSICAL THERAPIST

## 2025-01-14 PROCEDURE — 97530 THERAPEUTIC ACTIVITIES: CPT | Mod: GP | Performed by: PHYSICAL THERAPIST

## 2025-01-14 PROCEDURE — 97110 THERAPEUTIC EXERCISES: CPT | Mod: GP | Performed by: PHYSICAL THERAPIST

## 2025-01-16 ENCOUNTER — THERAPY VISIT (OUTPATIENT)
Dept: PHYSICAL THERAPY | Facility: CLINIC | Age: 75
End: 2025-01-16
Payer: COMMERCIAL

## 2025-01-16 DIAGNOSIS — Z96.651 TOTAL KNEE REPLACEMENT STATUS, RIGHT: Primary | ICD-10-CM

## 2025-01-21 ENCOUNTER — THERAPY VISIT (OUTPATIENT)
Dept: PHYSICAL THERAPY | Facility: CLINIC | Age: 75
End: 2025-01-21
Payer: COMMERCIAL

## 2025-01-21 DIAGNOSIS — Z96.651 TOTAL KNEE REPLACEMENT STATUS, RIGHT: Primary | ICD-10-CM

## 2025-01-21 PROCEDURE — 97110 THERAPEUTIC EXERCISES: CPT | Mod: GP | Performed by: PHYSICAL THERAPIST

## 2025-01-21 PROCEDURE — 97010 HOT OR COLD PACKS THERAPY: CPT | Mod: GP | Performed by: PHYSICAL THERAPIST

## 2025-01-21 PROCEDURE — 97530 THERAPEUTIC ACTIVITIES: CPT | Mod: GP | Performed by: PHYSICAL THERAPIST

## 2025-01-28 ENCOUNTER — THERAPY VISIT (OUTPATIENT)
Dept: PHYSICAL THERAPY | Facility: CLINIC | Age: 75
End: 2025-01-28
Payer: COMMERCIAL

## 2025-01-28 DIAGNOSIS — Z96.651 TOTAL KNEE REPLACEMENT STATUS, RIGHT: Primary | ICD-10-CM

## 2025-01-28 PROCEDURE — 97010 HOT OR COLD PACKS THERAPY: CPT | Mod: GP | Performed by: PHYSICAL THERAPIST

## 2025-01-28 PROCEDURE — 97530 THERAPEUTIC ACTIVITIES: CPT | Mod: GP | Performed by: PHYSICAL THERAPIST

## 2025-01-28 PROCEDURE — 97110 THERAPEUTIC EXERCISES: CPT | Mod: GP | Performed by: PHYSICAL THERAPIST

## 2025-01-30 ENCOUNTER — THERAPY VISIT (OUTPATIENT)
Dept: PHYSICAL THERAPY | Facility: CLINIC | Age: 75
End: 2025-01-30
Payer: COMMERCIAL

## 2025-01-30 DIAGNOSIS — Z96.651 TOTAL KNEE REPLACEMENT STATUS, RIGHT: Primary | ICD-10-CM

## 2025-02-04 ENCOUNTER — THERAPY VISIT (OUTPATIENT)
Dept: PHYSICAL THERAPY | Facility: CLINIC | Age: 75
End: 2025-02-04
Payer: COMMERCIAL

## 2025-02-04 DIAGNOSIS — Z96.651 TOTAL KNEE REPLACEMENT STATUS, RIGHT: Primary | ICD-10-CM

## 2025-02-04 PROCEDURE — 97110 THERAPEUTIC EXERCISES: CPT | Mod: GP | Performed by: PHYSICAL THERAPIST

## 2025-02-04 PROCEDURE — 97530 THERAPEUTIC ACTIVITIES: CPT | Mod: GP | Performed by: PHYSICAL THERAPIST

## 2025-02-04 PROCEDURE — 97010 HOT OR COLD PACKS THERAPY: CPT | Mod: GP | Performed by: PHYSICAL THERAPIST

## 2025-02-11 ENCOUNTER — THERAPY VISIT (OUTPATIENT)
Dept: PHYSICAL THERAPY | Facility: CLINIC | Age: 75
End: 2025-02-11
Payer: COMMERCIAL

## 2025-02-11 DIAGNOSIS — Z96.651 TOTAL KNEE REPLACEMENT STATUS, RIGHT: Primary | ICD-10-CM

## 2025-02-11 PROCEDURE — 97010 HOT OR COLD PACKS THERAPY: CPT | Mod: GP | Performed by: PHYSICAL THERAPIST

## 2025-02-11 PROCEDURE — 97110 THERAPEUTIC EXERCISES: CPT | Mod: GP | Performed by: PHYSICAL THERAPIST

## 2025-02-11 PROCEDURE — 97530 THERAPEUTIC ACTIVITIES: CPT | Mod: GP | Performed by: PHYSICAL THERAPIST

## 2025-02-11 NOTE — PROGRESS NOTES
02/11/25 0500   Appointment Info   Signing clinician's name / credentials Diego Hobson PT   Total/Authorized Visits 12 (ET)   Visits Used 10   Medical Diagnosis Right TKA   PT Tx Diagnosis Right TKA   Progress Note/Certification   Start of Care Date 01/06/25   Onset of illness/injury or Date of Surgery 12/04/24  (DOS)   Therapy Frequency 2x/week, then 1x/week   Predicted Duration 8 weeks   Certification date from 01/06/25   Certification date to 03/16/25   Progress Note Completed Date 02/11/25   GOALS   PT Goals 2   PT Goal 1   Goal Identifier Stairs   Goal Description ABle to ascend and descend reciprocally   Rationale to maximize safety and independence with performance of ADLs and functional tasks   Goal Progress met   Target Date 02/17/25   Date Met 02/04/25   PT Goal 2   Goal Identifier Walking   Goal Description Able to walk for 15 minutes   Rationale to maximize safety and independence with performance of ADLs and functional tasks   Goal Progress met   Target Date 02/17/25   Date Met 02/04/25   Subjective Report   Subjective Report Knee is doing well.  Less painful at night and is sleeping better.  Can walk for 30 minutes or more.  Can ascend and descend stairs reciprocally.   Objective Measures   Objective Measures Objective Measure 1   Objective Measure 1   Objective Measure PROM: 0-3-130   PT Modalities   PT Modalities Cryotherapy   Cryotherapy   Cryotherapy Minutes (02211) 10   Ice -Type Pack   Duration 10 min   Location Right knee   Positioning supine  (with bolster)   Treatment Interventions (PT)   Interventions Therapeutic Procedure/Exercise;Therapeutic Activity   Therapeutic Procedure/Exercise   Therapeutic Procedures: strength, endurance, ROM, flexibility minutes (15682) 30   Therapeutic Procedures Ther Proc 2;Ther Proc 3;Ther Proc 4;Ther Proc 5;Ther Proc 6;Ther Proc 7;Ther Proc 8;Ther Proc 9;Ther Proc 10   Ther Proc 1 Bike (SH=5)   Ther Proc 1 - Details 4 min ROM/warm up   Ther Proc 2 Leg  "press (SH=5) with pillow for head   Ther Proc 2 - Details 3x10 90#   Ther Proc 3 Seated HS curls   Ther Proc 3 - Details 2x15 green   Ther Proc 4 SL leg press   Ther Proc 4 - Details 2x10 45#   Ther Proc 5 Wallslides   Ther Proc 5 - Details 10x5\"   Ther Proc 6 Reformer leg press   Ther Proc 6 - Details 2x10 4 spr   Ther Proc 7 LAQ   Ther Proc 7 - Details 2x10 3#   Ther Proc 8 Supine passive knee ext str   Ther Proc 8 - Details 3 min with 5# on knee   Ther Proc 9 SLR flex and abd   Ther Proc 9 - Details Hold   Ther Proc 10 Heelslides in LS with belt and gastroc str   Ther Proc 10 - Details 10x5\", 5x10\"   Skilled Intervention Verbal, visual cueing   Therapeutic Activity   Therapeutic Activities: dynamic activities to improve functional performance minutes (98122) 15   Therapeutic Activities Ther Act 2;Ther Act 3;Ther Act 4;Ther Act 5;Ther Act 6   Ther Act 1 Functional knee ext   Ther Act 1 - Details 20x black   Ther Act 2 Step overs   Ther Act 2 - Details 20x7\"   Ther Act 3 Knee bends, toe raises, marching, butt kickers   Ther Act 3 - Details 2x10 each   Ther Act 4 SL balance   Ther Act 4 - Details 2x30\" EO on tramp   Ther Act 5 Cup walk   Ther Act 5 - Details 4x4 cups   Plan   Plan for next session DC with HEP.   Total Session Time   Timed Code Treatment Minutes 45   Total Treatment Time (sum of timed and untimed services) 55       DISCHARGE  Reason for Discharge: Patient has met all goals.    Equipment Issued: none    Discharge Plan: Patient to continue home program.    Referring Provider:  Girish Carter   "

## 2025-04-02 ENCOUNTER — TRANSCRIBE ORDERS (OUTPATIENT)
Dept: OTHER | Age: 75
End: 2025-04-02

## 2025-04-02 DIAGNOSIS — S76.302S LEFT HAMSTRING INJURY, SEQUELA: Primary | ICD-10-CM

## 2025-04-03 ENCOUNTER — THERAPY VISIT (OUTPATIENT)
Dept: PHYSICAL THERAPY | Facility: CLINIC | Age: 75
End: 2025-04-03
Payer: COMMERCIAL

## 2025-04-03 DIAGNOSIS — S76.312D STRAIN OF LEFT HAMSTRING MUSCLE, SUBSEQUENT ENCOUNTER: Primary | ICD-10-CM

## 2025-04-03 PROBLEM — S76.312A STRAIN OF LEFT HAMSTRING MUSCLE: Status: ACTIVE | Noted: 2025-04-03

## 2025-04-03 ASSESSMENT — ACTIVITIES OF DAILY LIVING (ADL)
PUTTING_ON_SOCKS_AND_SHOES: NO DIFFICULTY AT ALL
LANDING: SLIGHT DIFFICULTY
SPORTS_SCORE(%): 0
WALKING_15_MINUTES_OR_GREATER: MODERATE DIFFICULTY
RECREATIONAL ACTIVITIES: MODERATE DIFFICULTY
PLEASE_INDICATE_YOR_PRIMARY_REASON_FOR_REFERRAL_TO_THERAPY:: HIP
ADL_COUNT: 17
WALKING_DOWN_STEEP_HILLS: MODERATE DIFFICULTY
STANDING FOR 15 MINUTES: SLIGHT DIFFICULTY
STEPPING_UP_AND_DOWN_CURBS: SLIGHT DIFFICULTY
ROLLING_OVER_IN_BED: NO DIFFICULTY AT ALL
LIGHT_TO_MODERATE_WORK: SLIGHT DIFFICULTY
WALKING_DOWN_STEEP_HILLS: MODERATE DIFFICULTY
LOW_IMPACT_ACTIVITIES_LIKE_FAST_WALKING: SLIGHT DIFFICULTY
WALKING_INITIALLY: SLIGHT DIFFICULTY
GETTING_INTO_AND_OUT_OF_A_BATHTUB: NO DIFFICULTY AT ALL
HEAVY_WORK: MODERATE DIFFICULTY
GOING UP 1 FLIGHT OF STAIRS: SLIGHT DIFFICULTY
HOS_ADL_HIGHEST_POTENTIAL_SCORE: 68
TWISTING/PIVOTING_ON_INVOLVED_LEG: MODERATE DIFFICULTY
SWINGING_OBJECTS_LIKE_A_GOLF_CLUB: SLIGHT DIFFICULTY
ADL_SCORE(%): 0
SPORTS_TOTAL_ITEM_SCORE: 0
GETTING_INTO_AND_OUT_OF_A_BATHTUB: NO DIFFICULTY AT ALL
WALKING_FOR_APPROXIMATELY_10_MINUTES: SLIGHT DIFFICULTY
WALKING_UP_STEEP_HILLS: MODERATE DIFFICULTY
STEPPING UP AND DOWN CURBS: SLIGHT DIFFICULTY
ABILITY_TO_PERFORM_ACTIVITY_WITH_YOUR_NORMAL_TECHNIQUE: SLIGHT DIFFICULTY
TWISTING/PIVOTING ON INVOLVED LEG: MODERATE DIFFICULTY
SPORTS_HIGHEST_POTENTIAL_SCORE: 36
DEEP_SQUATTING: NO DIFFICULTY AT ALL
HOS_ADL_ITEM_SCORE_TOTAL: 49
GOING_UP_1_FLIGHT_OF_STAIRS: SLIGHT DIFFICULTY
GOING_DOWN_1_FLIGHT_OF_STAIRS: SLIGHT DIFFICULTY
STANDING_FOR_15_MINUTES: SLIGHT DIFFICULTY
WALKING_APPROXIMATELY_10_MINUTES: SLIGHT DIFFICULTY
ROLLING OVER IN BED: NO DIFFICULTY AT ALL
WALKING_INITIALLY: SLIGHT DIFFICULTY
GETTING_INTO_AND_OUT_OF_AN_AVERAGE_CAR: NO DIFFICULTY AT ALL
RECREATIONAL_ACTIVITIES: MODERATE DIFFICULTY
STARTING_AND_STOPPING_QUICKLY: MODERATE DIFFICULTY
JUMPING: UNABLE TO DO
SPORTS_COUNT: 9
RUNNING_ONE_MILE: UNABLE TO DO
HOW_WOULD_YOU_RATE_YOUR_CURRENT_LEVEL_OF_FUNCTION_DURING_YOUR_USUAL_ACTIVITIES_OF_DAILY_LIVING_FROM_0_TO_100_WITH_100_BEING_YOUR_LEVEL_OF_FUNCTION_PRIOR_TO_YOUR_HIP_PROBLEM_AND_0_BEING_THE_INABILITY_TO_PERFORM_ANY_OF_YOUR_USUAL_DAILY_ACTIVITIES?: 70
CUTTING/LATERAL_MOVEMENTS: MODERATE DIFFICULTY
HOS_ADL_SCORE(%): 72.06
WALKING_15_MINUTES_OR_GREATER: MODERATE DIFFICULTY
GOING DOWN 1 FLIGHT OF STAIRS: SLIGHT DIFFICULTY
HOW_WOULD_YOU_RATE_YOUR_CURRENT_LEVEL_OF_FUNCTION_DURING_YOUR_USUAL_ACTIVITIES_OF_DAILY_LIVING_FROM_0_TO_100_WITH_100_BEING_YOUR_LEVEL_OF_FUNCTION_PRIOR_TO_YOUR_HIP_PROBLEM_AND_0_BEING_THE_INABILITY_TO_PERFORM_ANY_OF_YOUR_USUAL_DAILY_ACTIVITIES?: 70
GETTING INTO AND OUT OF AN AVERAGE CAR: NO DIFFICULTY AT ALL
DEEP SQUATTING: NO DIFFICULTY AT ALL
HOW_WOULD_YOU_RATE_YOUR_CURRENT_LEVEL_OF_FUNCTION_DURING_YOUR_SPORTS_RELATED_ACTIVITIES_FROM_0_TO_100_WITH_100_BEING_YOUR_LEVEL_OF_FUNCTION_PRIOR_TO_YOUR_HIP_PROBLEM_AND_0_BEING_THE_INABILITY_TO_PERFORM_ANY_OF_YOUR_USUAL_DAILY_ACTIVITIES?: 60
LIGHT_TO_MODERATE_WORK: SLIGHT DIFFICULTY
SITTING FOR 15 MINUTES: NO DIFFICULTY AT ALL
WALKING_UP_STEEP_HILLS: MODERATE DIFFICULTY
PUTTING ON SOCKS AND SHOES: NO DIFFICULTY AT ALL
ADL_HIGHEST_POTENTIAL_SCORE: 68
ADL_TOTAL_ITEM_SCORE: 0
HOW_WOULD_YOU_RATE_YOUR_CURRENT_LEVEL_OF_FUNCTION?: NEARLY NORMAL
SITTING_FOR_15_MINUTES: NO DIFFICULTY AT ALL
ABILITY_TO_PARTICIPATE_IN_YOUR_DESIRED_SPORT_AS_LONG_AS_YOU_WOULD_LIKE: MODERATE DIFFICULTY
HEAVY_WORK: MODERATE DIFFICULTY

## 2025-04-03 NOTE — PROGRESS NOTES
PHYSICAL THERAPY EVALUATION  Type of Visit: Evaluation              Subjective     Pt describes pain and tightness in the area of his left proximal HS at the insertion on the ischial tuberosity.  Felt with walking, lateral movements.  Began two weeks ago when he felt pain in this area as he extended his left leg out and bent down to hit a ball while playing pickleball.  The pain has improved a little bit since the initial injury.          Presenting condition or subjective complaint: Chronic tight hamstring , recent injuy  Date of onset: 03/20/25 (DOI)    Relevant medical history:     Dates & types of surgery: Already recorded    Prior diagnostic imaging/testing results:       Prior therapy history for the same diagnosis, illness or injury: Yes 18 months ago Therapy for ruptured anterior tibialis and associated tight hamstring and Achilles    Prior Level of Function  Transfers: Independent  Ambulation: Independent  ADL: Independent  IADL:     Living Environment  Social support: With a significant other or spouse   Type of home: House   Stairs to enter the home: Yes 3 Is there a railing: Yes     Ramp: No   Stairs inside the home: Yes 13 Is there a railing: Yes     Help at home: None  Equipment owned:       Employment: No    Hobbies/Interests:      Patient goals for therapy: Play pickle ball, walk farther    Pain assessment: See objective evaluation for additional pain details     Objective   HIP EVALUATION  PAIN: Pain Level at Rest: 0/10  Pain Level with Use: 6/10  Pain Location: left upper HS and ischial tuberosity region  Pain is Exacerbated By: walking, running, cutting, lateral movements  INTEGUMENTARY (edema, incisions): WNL  POSTURE: WNL  GAIT:   Weightbearing Status: WBAT  Assistive Device(s): None  Gait Deviations: WNL  BALANCE/PROPRIOCEPTION:   WEIGHTBEARING ALIGNMENT: WNL  NON-WEIGHTBEARING ALIGNMENT:    ROM: PROM WNL    PELVIC/SI SCREEN:   STRENGTH:   Pain: - none + mild ++ moderate +++ severe  Strength  Scale: 0-5/5 Left Right   Hip Flexion 5 5   Hip Extension 5- 5-   Hip Abduction 5- 5-   Hip Adduction 5 5   Hip Internal Rotation 5 5   Hip External Rotation 5- 5-   Knee Flexion 5- 5   Knee Extension 5 5     LE FLEXIBILITY:  left HS tighter than right  SPECIAL TESTS:  negative LUZ, FADIR, hip scouring  FUNCTIONAL TESTS:   PALPATION:  mild tenderness at prox HS and isch tuberosity  JOINT MOBILITY:     Assessment & Plan   CLINICAL IMPRESSIONS  Medical Diagnosis:      Treatment Diagnosis: Left proximal HS strain   Impression/Assessment: Patient is a 74 year old male with left HS complaints.  The following significant findings have been identified: Pain, Decreased ROM/flexibility, and Decreased strength. These impairments interfere with their ability to perform self care tasks, recreational activities, household chores, household mobility, and community mobility as compared to previous level of function.     Clinical Decision Making (Complexity):  Clinical Presentation: Stable/Uncomplicated  Clinical Presentation Rationale: based on medical and personal factors listed in PT evaluation  Clinical Decision Making (Complexity): Low complexity    PLAN OF CARE  Treatment Interventions:  Modalities: Ultrasound  Interventions: Manual Therapy, Therapeutic Exercise    Long Term Goals     PT Goal 1  Goal Identifier: Walking  Goal Description: Able to walk 20 minutes without pain  Rationale: to maximize safety and independence with performance of ADLs and functional tasks  Target Date: 05/08/25  PT Goal 2  Goal Identifier: Pickleball  Goal Description: Return to playing pickleball without pain or limitations  Target Date: 05/22/25      Frequency of Treatment: 1x/week  Duration of Treatment: 6 weeks    Recommended Referrals to Other Professionals:   Education Assessment:        Risks and benefits of evaluation/treatment have been explained.   Patient/Family/caregiver agrees with Plan of Care.     Evaluation Time:     PT Eval, Low  Complexity Minutes (65759): 12       Signing Clinician: Diego Hobson PT        [facility]                                                                                   OUTPATIENT PHYSICAL THERAPY      PLAN OF TREATMENT FOR OUTPATIENT REHABILITATION   Patient's Last Name, First Name, Mynor Arias YOB: 1950   Provider's Name   [facility]   Medical Record No.  [id number]     Onset Date: 03/20/25 (DOI)  Start of Care Date: 04/03/25     Medical Diagnosis:         PT Treatment Diagnosis:  Left proximal HS strain Plan of Treatment  Frequency/Duration: 1x/week/ 6 weeks    Certification date from 04/03/25 to 05/14/25         See note for plan of treatment details and functional goals     Diego Hobson, NURYS                         I CERTIFY THE NEED FOR THESE SERVICES FURNISHED UNDER        THIS PLAN OF TREATMENT AND WHILE UNDER MY CARE     (Physician attestation of this document indicates review and certification of the therapy plan).              Referring Provider:  Freeman Eaton    Initial Assessment  See Epic Evaluation- Start of Care Date: 04/03/25

## 2025-04-15 ENCOUNTER — THERAPY VISIT (OUTPATIENT)
Dept: PHYSICAL THERAPY | Facility: CLINIC | Age: 75
End: 2025-04-15
Payer: COMMERCIAL

## 2025-04-15 DIAGNOSIS — S76.312D STRAIN OF LEFT HAMSTRING MUSCLE, SUBSEQUENT ENCOUNTER: Primary | ICD-10-CM

## 2025-04-15 PROCEDURE — 97035 APP MDLTY 1+ULTRASOUND EA 15: CPT | Mod: GP | Performed by: PHYSICAL THERAPIST

## 2025-04-15 PROCEDURE — 97110 THERAPEUTIC EXERCISES: CPT | Mod: GP | Performed by: PHYSICAL THERAPIST

## 2025-04-15 PROCEDURE — 97010 HOT OR COLD PACKS THERAPY: CPT | Mod: GP | Performed by: PHYSICAL THERAPIST

## 2025-04-25 ENCOUNTER — HOSPITAL ENCOUNTER (OUTPATIENT)
Dept: CARDIOLOGY | Facility: CLINIC | Age: 75
Discharge: HOME | End: 2025-04-25
Attending: INTERNAL MEDICINE | Admitting: INTERNAL MEDICINE
Payer: COMMERCIAL

## 2025-04-25 DIAGNOSIS — I35.0 NONRHEUMATIC AORTIC VALVE STENOSIS: ICD-10-CM

## 2025-04-25 LAB — LVEF ECHO: NORMAL

## 2025-04-25 PROCEDURE — 93306 TTE W/DOPPLER COMPLETE: CPT | Mod: 26 | Performed by: INTERNAL MEDICINE

## 2025-04-25 PROCEDURE — 93306 TTE W/DOPPLER COMPLETE: CPT

## 2025-04-28 ENCOUNTER — OFFICE VISIT (OUTPATIENT)
Dept: CARDIOLOGY | Facility: CLINIC | Age: 75
End: 2025-04-28
Attending: INTERNAL MEDICINE
Payer: COMMERCIAL

## 2025-04-28 VITALS
WEIGHT: 185.9 LBS | SYSTOLIC BLOOD PRESSURE: 126 MMHG | HEIGHT: 68 IN | HEART RATE: 80 BPM | BODY MASS INDEX: 28.17 KG/M2 | DIASTOLIC BLOOD PRESSURE: 68 MMHG

## 2025-04-28 DIAGNOSIS — E78.5 HYPERLIPIDEMIA LDL GOAL <70: ICD-10-CM

## 2025-04-28 DIAGNOSIS — I25.810 CORONARY ARTERY DISEASE INVOLVING CORONARY BYPASS GRAFT OF NATIVE HEART WITHOUT ANGINA PECTORIS: ICD-10-CM

## 2025-04-28 DIAGNOSIS — I35.0 NONRHEUMATIC AORTIC VALVE STENOSIS: ICD-10-CM

## 2025-04-28 DIAGNOSIS — I10 ESSENTIAL HYPERTENSION WITH GOAL BLOOD PRESSURE LESS THAN 140/90: ICD-10-CM

## 2025-04-28 PROCEDURE — 3078F DIAST BP <80 MM HG: CPT | Performed by: INTERNAL MEDICINE

## 2025-04-28 PROCEDURE — G2211 COMPLEX E/M VISIT ADD ON: HCPCS | Performed by: INTERNAL MEDICINE

## 2025-04-28 PROCEDURE — 99214 OFFICE O/P EST MOD 30 MIN: CPT | Performed by: INTERNAL MEDICINE

## 2025-04-28 PROCEDURE — 3074F SYST BP LT 130 MM HG: CPT | Performed by: INTERNAL MEDICINE

## 2025-04-28 NOTE — PROGRESS NOTES
HPI and Plan:   It is my pleasure to see your patient Vinnie Curry in follow-up.  This is a patient with a history of vasovagal syncope but who also has a history of mild coronary artery disease based upon coronary calcification. He also has a moderate aortic stenosis.    Since I last saw him he is doing well from a cardiac point of view but he has significant issues with musculoskeletal problems.  He had his right knee replaced and he hurt it when playing pickle ball down in [place] and he also pulled muscles in his left posterior thigh prior to that.    Echocardiography was performed a few days ago which shows that the degree of aortic stenosis has progressed slightly with a calculated aortic valve area going from 1.2 cm  to 1.1 cm  and the mean gradient across the aortic valve going from 29 mmHg to 30 mmHg.  He is asymptomatic with aortic stenosis with no symptoms of syncope, chest pains or chest pressure or unusual shortness of breath on exertion.  Unfortunately, he can get occasional episodes of syncope due to his vasovagal syncope.  But he has no exertional syncope.  His lipid profile was significantly worse than previously 3 days ago with an LDL of 80 HDL of 48 and triglycerides of 143 with a total cholesterol of 157.  Interestingly 4 weeks before he went to [place] where his diet was poor as he was eating out with his grandchildren, his LDL was excellent at 57 his HDL was also normal at 51 triglycerides were borderline at 155.  So it does appear that the poor diet in [place] on vacation plus immobility from hurting his right knee did worsen his lipids which are normally very well-controlled.  His basic metabolic profile is normal.    He has had gastritis from ibuprofen for his orthopedic issues.  He is taking Celebrex 100 mg/day but would like to go to 100 mg twice a day.  He was wondering about the cardiac issues with that.  I did explain to him that all nonsteroidal anti-inflammatory drugs have an adverse  effect on cardiac events though quite mild.  But he needs to heal his orthopedic issues with Celebrex then he should take it knowing that there is a slight increase in cardiovascular events.  He should take the least amount for the shortest period of time to control his symptoms.    Impression:  1.  Moderate aortic stenosis with slight worsening from previously as described above.  2.  Coronary artery disease which is asymptomatic.  3.  Worsening of his lipid profile mainly due to dietary issues and lack of exercise in [place].  4.  Vasovagal syncope with occasional syncopal episodes.  No exertional syncopal episodes.      Plan:  1.  I will see the patient back again in 1 years time repeat his echocardiogram, basic metabolic profile and lipid profile at that stage.  2.  I did explain to him the symptoms of symptomatic aortic stenosis and if any if those should occur he is to call us soon as possible.    The longitudinal plan of care for the diagnosis(es)/condition(s) as documented were addressed during this visit. Due to the added complexity in care, I will continue to support Al in the subsequent management and with ongoing continuity of care.    Alfonso Ng MD, FACC, FRCPI      Orders Placed This Encounter   Procedures    Basic metabolic panel    Lipid Profile    ALT    Follow-Up with Cardiology    Echocardiogram Complete       No orders of the defined types were placed in this encounter.      There are no discontinued medications.      Encounter Diagnoses   Name Primary?    Nonrheumatic aortic valve stenosis     Coronary artery disease involving coronary bypass graft of native heart without angina pectoris     Essential hypertension with goal blood pressure less than 140/90     Hyperlipidemia LDL goal <70        CURRENT MEDICATIONS:  Current Outpatient Medications   Medication Sig Dispense Refill    aspirin EC 81 MG tablet Take 1 tablet (81 mg) by mouth daily      Cholecalciferol (VITAMIN D) 2000 UNITS  CAPS Take by mouth daily      gabapentin (NEURONTIN) 300 MG capsule Take 300 mg by mouth 2 times daily       Ibuprofen (ADVIL PO) Take 400 mg by mouth every 4 hours as needed       losartan-hydrochlorothiazide (HYZAAR) 100-12.5 MG tablet Take 1 tablet by mouth daily 90 tablet 3    Multiple Vitamins-Minerals (DAILY MULTIVITAMIN PO) Take by mouth daily      Multiple Vitamins-Minerals (LUTEIN-ZEAXANTHIN PO)       omeprazole (PRILOSEC) 20 MG DR capsule TAKE 1 CAPSULE (20 MG) BY MOUTH DAILY 30 capsule 1    rosuvastatin (CRESTOR) 40 MG tablet Take 1 tablet (40 mg) by mouth daily 90 tablet 0    sildenafil (VIAGRA) 100 MG tablet Take 0.5-1 tablets ( mg) by mouth daily as needed 6 tablet 11    valACYclovir (VALTREX) 500 MG tablet TAKE ONE TABLET BY MOUTH TWICE DAILY FOR THREE DAYS FOR EACH OUTBREAK 30 tablet 1       ALLERGIES     Allergies   Allergen Reactions    Amoxicillin-Pot Clavulanate      vomiting       PAST MEDICAL HISTORY:  Past Medical History:   Diagnosis Date    Arthritis     Coronary artery disease     Mild    Erectile dysfunction due to arterial insufficiency 8/31/2015    Hyperlipidemia LDL goal <70 2/4/2017    Hypertension goal BP (blood pressure) < 140/90 7/17/2013    Since 2006     Syncope     vasovagal       PAST SURGICAL HISTORY:  Past Surgical History:   Procedure Laterality Date    ARTHROPLASTY HIP ANTERIOR  4/30/2014    Procedure: ARTHROPLASTY HIP ANTERIOR;  Surgeon: Ayaz Butcher MD;  Location: [facility] OR    ARTHROSCOPY KNEE WITH MEDIAL MENISCECTOMY  2001    left    ARTHROSCOPY SHOULDER  1999    right    COLONOSCOPY      HEMILAMINECTOMY, DISCECTOMY LUMBAR ONE LEVEL, COMBINED  1989    HERNIA REPAIR, INGUINAL RT/LT  2011    bilateral    ORTHOPEDIC SURGERY         FAMILY HISTORY:  Family History   Problem Relation Age of Onset    Arthritis Mother     Hypertension Mother     Arthritis Father     Hypertension Father     Arrhythmia Father         atrial fib    C.A.D. No family hx of     Diabetes  No family hx of     Prostate Cancer No family hx of     Cancer - colorectal No family hx of         mother with some polyps though       SOCIAL HISTORY:  Social History     Socioeconomic History    Marital status:      Spouse name: Melanie    Number of children: 3    Years of education: None    Highest education level: None   Occupational History    Occupation:      Employer: RETIRED   Tobacco Use    Smoking status: Never    Smokeless tobacco: Never   Substance and Sexual Activity    Alcohol use: Yes     Alcohol/week: 0.0 standard drinks of alcohol     Comment: 2-3 drinks per week    Drug use: No    Sexual activity: Yes     Partners: Female     Birth control/protection: Condom   Other Topics Concern    Parent/sibling w/ CABG, MI or angioplasty before 65F 55M? No     Service No    Blood Transfusions No    Caffeine Concern No     Comment: Hot tea daily - 2 cups    Occupational Exposure No    Hobby Hazards No    Sleep Concern No    Stress Concern No    Weight Concern No    Special Diet No    Back Care No    Exercise Yes     Comment: biking- 2-3 days per week  on average    Bike Helmet Yes    Seat Belt Yes    Self-Exams Yes     Social Drivers of Health     Financial Resource Strain: Low Risk  (3/28/2025)    Received from [facility]    Financial Resource Strain     Difficulty of Paying Living Expenses: 3   Food Insecurity: No Food Insecurity (3/28/2025)    Received from [facility]    Food Insecurity     Do you worry your food will run out before you are able to buy more?: 1   Transportation Needs: No Transportation Needs (3/28/2025)    Received from [facility]    Transportation Needs     Does lack of transportation keep you from medical appointments?: 1     Does lack of transportation keep you from work, meetings or getting things that you need?: 1   Social Connections: Socially Integrated  "(3/28/2025)    Received from [facility]    Social Connections     Do you often feel lonely or isolated from those around you?: 0   Housing Stability: Low Risk  (3/28/2025)    [facility]    Housing Stability     What is your housing situation today?: 1       Review of Systems:  Skin:  not assessed       Eyes:  not assessed      ENT:  not assessed      Respiratory:  Negative       Cardiovascular:    Positive for, lightheadedness, dizziness    Gastroenterology: not assessed      Genitourinary:  not assessed      Musculoskeletal:  not assessed      Neurologic:  not assessed      Psychiatric:  not assessed      Heme/Lymph/Imm:  not assessed      Endocrine:  not assessed        Physical Exam:  Vitals: /68 (BP Location: Right arm, Patient Position: Sitting, Cuff Size: Adult Regular)   Pulse 80   Ht 1.727 m (5' 8\")   Wt 84.3 kg (185 lb 14.4 oz)   BMI 28.27 kg/m      Constitutional:  cooperative, alert and oriented, well developed, well nourished, in no acute distress thin      Skin:  warm and dry to the touch, no apparent skin lesions or masses noted          Head:  normocephalic, no masses or lesions        Eyes:  pupils equal and round        Lymph:No Cervical lymphadenopathy present     ENT:  no pallor or cyanosis, dentition good, no pallor or cyanosis        Neck:  carotid pulses are full and equal bilaterally, JVP normal, no carotid bruit        Respiratory:  normal breath sounds, clear to auscultation, normal A-P diameter, normal symmetry, normal respiratory excursion, no use of accessory muscles         Cardiac: regular rhythm, normal S1/S2, no S3 or S4, apical impulse not displaced, no murmurs, gallops or rubs                pulses full and equal, no bruits auscultated                                        GI:  not assessed this visit        Extremities and Muscular Skeletal:  no deformities, clubbing, cyanosis, erythema " observed, no edema              Neurological:  no gross motor deficits, affect appropriate        Psych:  Alert and Oriented x 3        CC  Alfonso Ng MD  [id number]  [place] [id number]

## 2025-06-12 ENCOUNTER — TRANSCRIBE ORDERS (OUTPATIENT)
Dept: OTHER | Age: 75
End: 2025-06-12

## 2025-06-12 DIAGNOSIS — M54.50 LOW BACK PAIN: Primary | ICD-10-CM

## 2025-06-12 DIAGNOSIS — M54.6 THORACIC BACK PAIN: ICD-10-CM

## 2025-06-12 DIAGNOSIS — M47.814 THORACIC SPONDYLOSIS WITHOUT MYELOPATHY: ICD-10-CM

## 2025-06-12 DIAGNOSIS — M54.14 THORACIC RADICULITIS: ICD-10-CM

## 2025-06-23 ENCOUNTER — THERAPY VISIT (OUTPATIENT)
Dept: PHYSICAL THERAPY | Facility: CLINIC | Age: 75
End: 2025-06-23
Payer: COMMERCIAL

## 2025-06-23 DIAGNOSIS — M54.6 ACUTE BILATERAL THORACIC BACK PAIN: Primary | ICD-10-CM

## 2025-06-23 DIAGNOSIS — M54.50 ACUTE BILATERAL LOW BACK PAIN WITHOUT SCIATICA: ICD-10-CM

## 2025-06-23 PROCEDURE — 97161 PT EVAL LOW COMPLEX 20 MIN: CPT | Mod: GP | Performed by: PHYSICAL THERAPIST

## 2025-06-23 PROCEDURE — 97010 HOT OR COLD PACKS THERAPY: CPT | Mod: GP | Performed by: PHYSICAL THERAPIST

## 2025-06-23 PROCEDURE — 97110 THERAPEUTIC EXERCISES: CPT | Mod: GP | Performed by: PHYSICAL THERAPIST

## 2025-06-23 PROCEDURE — 97140 MANUAL THERAPY 1/> REGIONS: CPT | Mod: GP | Performed by: PHYSICAL THERAPIST

## 2025-06-23 NOTE — PROGRESS NOTES
PHYSICAL THERAPY EVALUATION  Type of Visit: Evaluation        Fall Risk Screen:  Have you fallen 2 or more times in the past year?: No  Have you fallen and had an injury in the past year?: No    Subjective         Presenting condition or subjective complaint: pain in the thoracic area of the back  Date of onset: 06/12/25    Relevant medical history: Arthritis; Foot drop; Heart problems; High blood pressure; Osteoarthritis   Dates & types of surgery: oo numerous to relist  see history    Prior diagnostic imaging/testing results:       Prior therapy history for the same diagnosis, illness or injury: No      Prior Level of Function  Transfers: Independent  Ambulation: Independent  ADL: Independent  IADL:     Living Environment  Social support: With a significant other or spouse   Type of home: House   Stairs to enter the home: Yes 4 Is there a railing: Yes     Ramp: No   Stairs inside the home: Yes 14 Is there a railing: Yes     Help at home: None  Equipment owned: Straight Cane; Crutches; Power wheelchair or scooter; Grab bars; Commode     Employment: No    Hobbies/Interests: biking gardening [place]    Patient goals for therapy: sit comfortably and be more flexible    Pain assessment: See objective evaluation for additional pain details     Objective   LUMBAR SPINE EVALUATION  PAIN: Pain Location: Thoracic spine T7-L2 level, left > right; some pain radiation around rib cage  Pain is Exacerbated By: sitting in a recliner or his office chair, physical activity  Pain is Relieved By: heat and movement  INTEGUMENTARY (edema, incisions): WNL  POSTURE: WFL  GAIT:   Weightbearing Status: WBAT  Assistive Device(s): None  Gait Deviations: WNL  BALANCE/PROPRIOCEPTION:   WEIGHTBEARING ALIGNMENT: reduced lumbar lordosis, thoracic kyphosis  NON-WEIGHTBEARING ALIGNMENT:    ROM: trunk flex limited to mid shins, bilat SB is mod limited with no pain; bilat rot is mod limited with some pain in left T-L spine; Ext is mod  limited  PELVIC/SI SCREEN:   STRENGTH: WNL    MYOTOMES:   DTR S:   CORD SIGNS:   DERMATOMES:   NEURAL TENSION: Lumbar WNL  Thoracic WNL  FLEXIBILITY: tight HS  LUMBAR/HIP Special Tests:    PELVIS/SI SPECIAL TESTS:   FUNCTIONAL TESTS:   PALPATION: hypertonic left>right T-L PVM's  SPINAL SEGMENTAL CONCLUSIONS:       Assessment & Plan   CLINICAL IMPRESSIONS  Medical Diagnosis: Low back pain  Thoracic back pain  Thoracic radiculitis  Thoracic spondylosis without myelopathy    Treatment Diagnosis: Thoracic back pain   Impression/Assessment: Patient is a 74 year old male with mid to low back complaints.  The following significant findings have been identified: Pain and Decreased ROM/flexibility. These impairments interfere with their ability to perform self care tasks, recreational activities, and household chores as compared to previous level of function.     Clinical Decision Making (Complexity):  Clinical Presentation: Stable/Uncomplicated  Clinical Presentation Rationale: based on medical and personal factors listed in PT evaluation  Clinical Decision Making (Complexity): Low complexity    PLAN OF CARE  Treatment Interventions:  Modalities: Hot Pack  Interventions: Manual Therapy, Therapeutic Exercise    Long Term Goals     PT Goal 1  Goal Identifier: Sitting  Goal Description: Able to sit for 1 hour without back pain  Rationale: to maximize safety and independence with performance of ADLs and functional tasks  Target Date: 08/04/25      Frequency of Treatment: 1x/week  Duration of Treatment: 8 weeks    Recommended Referrals to Other Professionals:   Education Assessment:        Risks and benefits of evaluation/treatment have been explained.   Patient/Family/caregiver agrees with Plan of Care.     Evaluation Time:     PT Eval, Low Complexity Minutes (35356): 15       Signing Clinician: Diego Hobson PT        [facility]                                                                                    OUTPATIENT PHYSICAL THERAPY      PLAN OF TREATMENT FOR OUTPATIENT REHABILITATION   Patient's Last Name, First Name, Mynor Arias YOB: 1950   Provider's Name   [facility]   Medical Record No.  [id number]     Onset Date: 06/12/25  Start of Care Date: 06/23/25     Medical Diagnosis:  Low back pain  Thoracic back pain  Thoracic radiculitis  Thoracic spondylosis without myelopathy      PT Treatment Diagnosis:  Thoracic back pain Plan of Treatment  Frequency/Duration: 1x/week/ 8 weeks    Certification date from 06/23/25 to 08/17/25         See note for plan of treatment details and functional goals     Diego Hobson, PT                         I CERTIFY THE NEED FOR THESE SERVICES FURNISHED UNDER        THIS PLAN OF TREATMENT AND WHILE UNDER MY CARE .             Physician Signature               Date    X_____________________________________________________                  Referring Provider:  Kenia Borges    Initial Assessment  See Epic Evaluation- Start of Care Date: 06/23/25

## 2025-06-30 ENCOUNTER — THERAPY VISIT (OUTPATIENT)
Dept: PHYSICAL THERAPY | Facility: CLINIC | Age: 75
End: 2025-06-30
Payer: COMMERCIAL

## 2025-06-30 DIAGNOSIS — M54.6 ACUTE BILATERAL THORACIC BACK PAIN: Primary | ICD-10-CM

## 2025-06-30 DIAGNOSIS — M54.50 ACUTE BILATERAL LOW BACK PAIN WITHOUT SCIATICA: ICD-10-CM

## 2025-06-30 PROCEDURE — 97010 HOT OR COLD PACKS THERAPY: CPT | Mod: GP | Performed by: PHYSICAL THERAPIST

## 2025-06-30 PROCEDURE — 97140 MANUAL THERAPY 1/> REGIONS: CPT | Mod: GP | Performed by: PHYSICAL THERAPIST

## 2025-06-30 PROCEDURE — 97110 THERAPEUTIC EXERCISES: CPT | Mod: GP | Performed by: PHYSICAL THERAPIST

## 2025-07-07 ENCOUNTER — THERAPY VISIT (OUTPATIENT)
Dept: PHYSICAL THERAPY | Facility: CLINIC | Age: 75
End: 2025-07-07
Payer: COMMERCIAL

## 2025-07-07 DIAGNOSIS — M54.6 ACUTE BILATERAL THORACIC BACK PAIN: Primary | ICD-10-CM

## 2025-07-07 DIAGNOSIS — M54.50 ACUTE BILATERAL LOW BACK PAIN WITHOUT SCIATICA: ICD-10-CM

## 2025-07-07 PROCEDURE — 97110 THERAPEUTIC EXERCISES: CPT | Mod: GP | Performed by: PHYSICAL THERAPIST

## 2025-07-07 PROCEDURE — 97010 HOT OR COLD PACKS THERAPY: CPT | Mod: GP | Performed by: PHYSICAL THERAPIST

## 2025-07-07 PROCEDURE — 97140 MANUAL THERAPY 1/> REGIONS: CPT | Mod: GP | Performed by: PHYSICAL THERAPIST

## 2025-07-17 ENCOUNTER — THERAPY VISIT (OUTPATIENT)
Dept: PHYSICAL THERAPY | Facility: CLINIC | Age: 75
End: 2025-07-17
Payer: COMMERCIAL

## 2025-07-17 DIAGNOSIS — M54.6 ACUTE BILATERAL THORACIC BACK PAIN: Primary | ICD-10-CM

## 2025-07-17 DIAGNOSIS — M54.50 ACUTE BILATERAL LOW BACK PAIN WITHOUT SCIATICA: ICD-10-CM

## 2025-08-18 ENCOUNTER — TELEPHONE (OUTPATIENT)
Dept: CARDIOLOGY | Facility: CLINIC | Age: 75
End: 2025-08-18
Payer: COMMERCIAL